# Patient Record
Sex: FEMALE | Race: WHITE | Employment: OTHER | URBAN - NONMETROPOLITAN AREA
[De-identification: names, ages, dates, MRNs, and addresses within clinical notes are randomized per-mention and may not be internally consistent; named-entity substitution may affect disease eponyms.]

---

## 2017-01-27 ENCOUNTER — ALLIED HEALTH/NURSE VISIT (OUTPATIENT)
Dept: AUDIOLOGY | Facility: OTHER | Age: 62
End: 2017-01-27
Attending: AUDIOLOGIST
Payer: MEDICAID

## 2017-01-27 DIAGNOSIS — H90.8 MIXED HEARING LOSS: Primary | ICD-10-CM

## 2017-01-27 PROCEDURE — V5266 BATTERY FOR HEARING DEVICE: HCPCS

## 2017-04-27 ENCOUNTER — ALLIED HEALTH/NURSE VISIT (OUTPATIENT)
Dept: AUDIOLOGY | Facility: OTHER | Age: 62
End: 2017-04-27
Attending: AUDIOLOGIST
Payer: MEDICAID

## 2017-04-27 DIAGNOSIS — H90.8 MIXED HEARING LOSS: ICD-10-CM

## 2017-04-27 PROCEDURE — V5266 BATTERY FOR HEARING DEVICE: HCPCS

## 2017-08-10 DIAGNOSIS — M48.061 STENOSIS, SPINAL, LUMBAR: Primary | ICD-10-CM

## 2017-08-11 ENCOUNTER — HOSPITAL ENCOUNTER (OUTPATIENT)
Dept: MRI IMAGING | Facility: HOSPITAL | Age: 62
Discharge: HOME | End: 2017-08-11
Attending: SPECIALIST | Admitting: SPECIALIST
Payer: MEDICAID

## 2017-08-11 PROCEDURE — A9585 GADOBUTROL INJECTION: HCPCS | Mod: TC

## 2017-08-11 PROCEDURE — 72158 MRI LUMBAR SPINE W/O & W/DYE: CPT | Mod: TC

## 2017-08-11 RX ORDER — GADOBUTROL 604.72 MG/ML
10 INJECTION INTRAVENOUS ONCE
Status: COMPLETED | OUTPATIENT
Start: 2017-08-11 | End: 2017-08-11

## 2017-08-11 RX ADMIN — GADOBUTROL 10 ML: 604.72 INJECTION INTRAVENOUS at 13:53

## 2017-08-14 DIAGNOSIS — H91.93 DECREASED HEARING, BILATERAL: Primary | ICD-10-CM

## 2017-08-21 ENCOUNTER — ALLIED HEALTH/NURSE VISIT (OUTPATIENT)
Dept: AUDIOLOGY | Facility: OTHER | Age: 62
End: 2017-08-21
Attending: AUDIOLOGIST
Payer: MEDICAID

## 2017-08-21 DIAGNOSIS — H90.8 MIXED HEARING LOSS: ICD-10-CM

## 2017-08-21 DIAGNOSIS — H90.8 MIXED HEARING LOSS, UNILATERAL: Primary | ICD-10-CM

## 2017-08-21 PROCEDURE — V5266 BATTERY FOR HEARING DEVICE: HCPCS

## 2017-09-13 ENCOUNTER — OFFICE VISIT (OUTPATIENT)
Dept: AUDIOLOGY | Facility: OTHER | Age: 62
End: 2017-09-13
Attending: AUDIOLOGIST
Payer: MEDICAID

## 2017-09-13 ENCOUNTER — OFFICE VISIT (OUTPATIENT)
Dept: OTOLARYNGOLOGY | Facility: OTHER | Age: 62
End: 2017-09-13
Attending: PHYSICIAN ASSISTANT
Payer: MEDICAID

## 2017-09-13 VITALS
WEIGHT: 220 LBS | BODY MASS INDEX: 33.34 KG/M2 | SYSTOLIC BLOOD PRESSURE: 110 MMHG | TEMPERATURE: 97.5 F | HEIGHT: 68 IN | HEART RATE: 71 BPM | DIASTOLIC BLOOD PRESSURE: 76 MMHG | OXYGEN SATURATION: 95 %

## 2017-09-13 DIAGNOSIS — J30.2 SEASONAL ALLERGIC RHINITIS, UNSPECIFIED CHRONICITY, UNSPECIFIED TRIGGER: ICD-10-CM

## 2017-09-13 DIAGNOSIS — H90.3 SENSORINEURAL HEARING LOSS (SNHL) OF BOTH EARS: Primary | ICD-10-CM

## 2017-09-13 DIAGNOSIS — H90.3 SENSORINEURAL HEARING LOSS, BILATERAL: Primary | ICD-10-CM

## 2017-09-13 DIAGNOSIS — Z98.890 HISTORY OF TYMPANOMASTOIDECTOMY: ICD-10-CM

## 2017-09-13 PROCEDURE — 99214 OFFICE O/P EST MOD 30 MIN: CPT | Mod: 25

## 2017-09-13 PROCEDURE — 99213 OFFICE O/P EST LOW 20 MIN: CPT | Mod: 25 | Performed by: PHYSICIAN ASSISTANT

## 2017-09-13 PROCEDURE — 92567 TYMPANOMETRY: CPT | Performed by: AUDIOLOGIST

## 2017-09-13 PROCEDURE — 92504 EAR MICROSCOPY EXAMINATION: CPT | Performed by: PHYSICIAN ASSISTANT

## 2017-09-13 PROCEDURE — 92590 ZZHC HEARING AID EXAM MONAURAL: CPT | Performed by: AUDIOLOGIST

## 2017-09-13 PROCEDURE — 92504 EAR MICROSCOPY EXAMINATION: CPT

## 2017-09-13 PROCEDURE — 92557 COMPREHENSIVE HEARING TEST: CPT | Performed by: AUDIOLOGIST

## 2017-09-13 PROCEDURE — 99203 OFFICE O/P NEW LOW 30 MIN: CPT

## 2017-09-13 RX ORDER — EPINEPHRINE 0.3 MG/.3ML
0.3 INJECTION SUBCUTANEOUS PRN
Status: ON HOLD | COMMUNITY
End: 2018-11-06

## 2017-09-13 RX ORDER — TRAZODONE HYDROCHLORIDE 50 MG/1
50 TABLET, FILM COATED ORAL AT BEDTIME
COMMUNITY

## 2017-09-13 RX ORDER — DIAZEPAM 2 MG
2 TABLET ORAL DAILY
COMMUNITY
Start: 2017-07-24 | End: 2018-10-12

## 2017-09-13 RX ORDER — HYDROCHLOROTHIAZIDE 25 MG/1
25 TABLET ORAL DAILY
Status: ON HOLD | COMMUNITY
End: 2021-01-01

## 2017-09-13 RX ORDER — ALBUTEROL SULFATE 0.83 MG/ML
2.5 SOLUTION RESPIRATORY (INHALATION) PRN
Status: ON HOLD | COMMUNITY
End: 2021-01-01

## 2017-09-13 RX ORDER — AMMONIUM LACTATE 12 G/100G
1 LOTION TOPICAL PRN
Status: ON HOLD | COMMUNITY
End: 2018-11-06

## 2017-09-13 RX ORDER — VITAMIN E 268 MG
2000 CAPSULE ORAL DAILY
Status: ON HOLD | COMMUNITY
End: 2018-11-06

## 2017-09-13 RX ORDER — LEVOTHYROXINE SODIUM 112 UG/1
112 TABLET ORAL DAILY
COMMUNITY

## 2017-09-13 RX ORDER — ALBUTEROL SULFATE 90 UG/1
2 AEROSOL, METERED RESPIRATORY (INHALATION) EVERY 4 HOURS PRN
COMMUNITY

## 2017-09-13 RX ORDER — FERROUS SULFATE 325(65) MG
325 TABLET ORAL DAILY
Status: ON HOLD | COMMUNITY
End: 2018-11-06

## 2017-09-13 RX ORDER — POTASSIUM CHLORIDE 1500 MG/1
40 TABLET, EXTENDED RELEASE ORAL DAILY
Status: ON HOLD | COMMUNITY
End: 2018-11-06

## 2017-09-13 RX ORDER — AMOXICILLIN 250 MG
2 CAPSULE ORAL DAILY
Status: ON HOLD | COMMUNITY
Start: 2017-07-24 | End: 2018-11-06

## 2017-09-13 RX ORDER — ATORVASTATIN CALCIUM 10 MG/1
10 TABLET, FILM COATED ORAL DAILY
COMMUNITY

## 2017-09-13 RX ORDER — CHOLECALCIFEROL (VITAMIN D3) 50 MCG
2000 TABLET ORAL DAILY
Status: ON HOLD | COMMUNITY
End: 2018-11-06

## 2017-09-13 RX ORDER — CITALOPRAM HYDROBROMIDE 40 MG/1
40 TABLET ORAL DAILY
Status: ON HOLD | COMMUNITY
End: 2018-11-06

## 2017-09-13 RX ORDER — OLOPATADINE HYDROCHLORIDE 1 MG/ML
1 SOLUTION/ DROPS OPHTHALMIC DAILY
Status: ON HOLD | COMMUNITY
End: 2018-11-06

## 2017-09-13 RX ORDER — CETIRIZINE HYDROCHLORIDE 10 MG/1
10 TABLET ORAL DAILY
COMMUNITY

## 2017-09-13 ASSESSMENT — PAIN SCALES - GENERAL: PAINLEVEL: NO PAIN (0)

## 2017-09-13 NOTE — NURSING NOTE
"Chief Complaint   Patient presents with     Cerumen Impaction     Ear cleaning/New last visit 06/24/2014       Initial /76 (BP Location: Left arm, Patient Position: Chair, Cuff Size: Adult Regular)  Pulse 71  Temp 97.5  F (36.4  C) (Tympanic)  Ht 5' 8\" (1.727 m)  Wt 220 lb (99.8 kg)  SpO2 95%  BMI 33.45 kg/m2 Estimated body mass index is 33.45 kg/(m^2) as calculated from the following:    Height as of this encounter: 5' 8\" (1.727 m).    Weight as of this encounter: 220 lb (99.8 kg).  Medication Reconciliation: complete   Che Hogan          "

## 2017-09-13 NOTE — MR AVS SNAPSHOT
"              After Visit Summary   9/13/2017    Jimena Mensah    MRN: [id number]           Patient Information     Date Of Birth          1955        Visit Information        Provider Department      9/13/2017 1:15 PM Zuleyka Lindo PA-C [facility]        Care Instructions    Ears look good  Audiogram to be completed    Follow up as needed for ear cleaning  Contact allergy nurse when you are ready to return for testing/ allergy shots    If there are concerns or questions, call [phone] and ask for nurse             Follow-ups after your visit        Your next 10 appointments already scheduled     Sep 13, 2017  1:45 PM CDT   Hearing Aid Initial with Jeff Bardales   [facility] [place] ([facility] [place] )    [address]  [place] [address] [phone]              Who to contact     If you have questions or need follow up information about today's clinic visit or your schedule please contact [facility] directly at [phone].  Normal or non-critical lab and imaging results will be communicated to you by Pre[employer]hart, letter or phone within 4 business days after the clinic has received the results. If you do not hear from us within 7 days, please contact the clinic through Preventsyshart or phone. If you have a critical or abnormal lab result, we will notify you by phone as soon as possible.  Submit refill requests through [employer] or call your pharmacy and they will forward the refill request to us. Please allow 3 business days for your refill to be completed.          Additional Information About Your Visit        Preventsyshart Information     [employer] lets you send messages to your doctor, view your test results, renew your prescriptions, schedule appointments and more. To sign up, go to [email or web address] . Click on \"Log in\" on the left side of the screen, which will take you to the Welcome page. Then click on \"Sign up Now\" on the right side of the page. " "    You will be asked to enter the access code listed below, as well as some personal information. Please follow the directions to create your username and password.     Your access code is: [email or web address]  Expires: 2017 11:35 AM     Your access code will  in 90 days. If you need help or a new code, please call your [facility] clinic or [phone].        Care EveryWhere ID     This is your Care EveryWhere ID. This could be used by other organizations to access your [facility] medical records  [id number]        Your Vitals Were     Pulse Temperature Height Pulse Oximetry BMI (Body Mass Index)       71 97.5  F (36.4  C) (Tympanic) 5' 8\" (1.727 m) 95% 33.45 kg/m2        Blood Pressure from Last 3 Encounters:   17 110/76   16 137/62   03/26/15 133/84    Weight from Last 3 Encounters:   17 220 lb (99.8 kg)   10/22/14 249 lb (112.9 kg)   14 230 lb (104.3 kg)              Today, you had the following     No orders found for display       Primary Care Provider Office Phone # Fax #    Chantell Cook [phone]       [facility] MEDICINE [address]  [facility] [address]        Equal Access to Services     [facility] PHILIP : Hadii elvi rubin hadasho Sohelder, waaxda luqadaha, qaybta kaalmada adedenae, charles colorado . So [facility] [phone].    ATENCIÓN: Si habla español, tiene a jean disposición servicios gratuitos de asistencia lingüística. Santosh al [phone].    We comply with applicable federal civil rights laws and [place] laws. We do not discriminate on the basis of race, color, national origin, age, disability sex, sexual orientation or gender identity.            Thank you!     Thank you for choosing [facility]  for your care. Our goal is always to provide you with excellent care. Hearing back from our patients is one way we can continue to improve our services. Please take a few minutes to complete the written survey that " you may receive in the mail after your visit with us. Thank you!             Your Updated Medication List - Protect others around you: Learn how to safely use, store and throw away your medicines at www.disposemymeds.org.          This list is accurate as of: 9/13/17  1:40 PM.  Always use your most recent med list.                   Brand Name Dispense Instructions for use Diagnosis    * albuterol (5 MG/ML) 0.5% neb solution    PROVENTIL     Take 2.5 mg by nebulization every 6 hours as needed for wheezing or shortness of breath / dyspnea        * albuterol (2.5 MG/3ML) 0.083% neb solution      Inhale 2.5 mg into the lungs as needed        * albuterol 108 (90 BASE) MCG/ACT Inhaler    PROAIR HFA/PROVENTIL HFA/VENTOLIN HFA     Inhale 2 puffs into the lungs as needed        atorvastatin 10 MG tablet    LIPITOR     Take 10 mg by mouth daily        budesonide 32 MCG/ACT spray    RINOCORT AQUA     Spray 2 sprays into both nostrils as needed        calcium carb 1250 mg (500 mg Nikolai)/vitamin D 200 units 500-200 MG-UNIT per tablet    OSCAL with D     Take 1 tablet by mouth daily        * CELEXA PO      Take 40 mg by mouth daily        * citalopram 40 MG tablet    celeXA     Take 40 mg by mouth daily        cetirizine 10 MG tablet    zyrTEC     Take 10 mg by mouth daily        * cholecalciferol 15262 UNITS capsule    vitamin D3     Take 1 capsule by mouth daily.        * vitamin D 2000 UNITS tablet      Take 2,000 Units by mouth daily        DAILY AMINO 6000 PO      Take 625 mg by mouth daily        * diazepam 2 MG tablet    VALIUM     Take 1 tablet by mouth daily For vertigo        * diazepam 2 MG tablet    VALIUM     Take 2 mg by mouth daily        ELIDEL 1 % cream   Generic drug:  pimecrolimus      Apply  topically 2 times daily.        * EPINEPHrine 0.3 MG/0.3ML injection 2-pack    EPIPEN/ADRENACLICK/or ANY BX GENERIC EQUIV     Inject 0.3 mg into the muscle as needed        * EPIPEN 0.3 MG/0.3ML injection   Generic drug:   EPINEPHrine      Inject 0.3 mg into the muscle once as needed. For anaphylaxis        * ferrous sulfate 325 (65 FE) MG tablet    IRON     Take 325 mg by mouth daily (with breakfast)        * ferrous sulfate 325 (65 FE) MG tablet    IRON     Take 325 mg by mouth daily        FIBERCON 625 MG tablet   Generic drug:  calcium polycarbophil      Take 1 tablet by mouth 2 times daily as needed.        flaxseed oil 1000 MG Caps      Take 2,000 mg by mouth daily        * hydrochlorothiazide 25 MG tablet    HYDRODIURIL     Take 25 mg by mouth daily.        * hydrochlorothiazide 25 MG tablet    HYDRODIURIL     Take 25 mg by mouth daily        HYDROcodone-acetaminophen 5-325 MG per tablet    NORCO     Take 2 tablets by mouth every 4 hours as needed for moderate to severe pain        ipratropium - albuterol 0.5 mg/2.5 mg/3 mL 0.5-2.5 (3) MG/3ML neb solution    DUONEB    360 mL    Take 1 vial (3 mLs) by nebulization every 4 hours as needed for shortness of breath / dyspnea or wheezing        * LAC-HYDRIN 12 % lotion   Generic drug:  ammonium lactate      Apply  topically 2 times daily.        * ammonium lactate 12 % lotion    LAC-HYDRIN     Apply 1 inch topically as needed        levothyroxine 112 MCG tablet    SYNTHROID/LEVOTHROID     Take 112 mcg by mouth daily        montelukast 10 MG tablet    SINGULAIR     Take 10 mg by mouth At Bedtime        MOVANTIK 12.5 MG Tabs tablet   Generic drug:  naloxegol      Take 12.5 mg by mouth every morning (before breakfast)        multivitamin Tabs tablet      Take 1 tablet by mouth daily        olopatadine 0.6 % nasal spray    PATANASE    2 Bottle    Spray 2 sprays into both nostrils 2 times daily    Seasonal allergic rhinitis, ETD (eustachian tube dysfunction)       * PATANOL 0.1 % ophthalmic solution   Generic drug:  olopatadine      Place 1 drop into both eyes 2 times daily.        * olopatadine 0.1 % ophthalmic solution    PATANOL     Place 1 drop into both eyes daily        polyethylene  glycol powder    MIRALAX/GLYCOLAX     Take 1 capful by mouth daily        * potassium chloride SA 20 MEQ CR tablet    K-DUR/KLOR-CON M     Take 40 mEq by mouth daily        * potassium chloride SA 20 MEQ CR tablet    K-DUR/KLOR-CON M     Take 2 tablets by mouth 2 times daily. With food        PREDNISONE PO      Take by mouth daily Doesn't know mg        senna-docusate 8.6-50 MG per tablet    SENOKOT-S;PERICOLACE     Take 2 tablets by mouth daily        tolterodine 4 MG 24 hr capsule    DETROL LA     Take by mouth daily        traZODone 50 MG tablet    DESYREL     Take 50 mg by mouth daily        triamcinolone 55 MCG/ACT Inhaler    NASACORT     Spray 2 sprays into both nostrils daily        ZANAFLEX PO      Take 4 mg by mouth every 8 hours as needed for muscle spasms        * Notice:  This list has 21 medication(s) that are the same as other medications prescribed for you. Read the directions carefully, and ask your doctor or other care provider to review them with you.

## 2017-09-13 NOTE — PROGRESS NOTES
Audiology Evaluation Completed. Please refer SCANNED AUDIOGRAM and/or TYMPANOGRAM for BACKGROUND, RESULTS, RECOMMENDATIONS.    Genesis HATCH, [facility]-A  Audiologist #[id number]          BACKGROUND:  Jimena was seen today for consult regarding hearing aids. The patient notes difficulty with communication in a variety of listening situations and would like to explore possible benefit obtained via use of amplification.      Patient has received medical clearance for hearing aid use from [facility]uleyka [facility].  Jimena is a monaural hearing aid candidate and will receive a Hearing Aid Recommendation today to follow [facility] CONTRACT GUIDELINES .    RESULTS:  Brochure form [facility] titled 'Legal rights and consumer information about purchasing a hearing instrument ' verbally reviewed and given to patient. Trial Period, cancellation fee, warranties highlighted. Estimated insurance coverage for hearing aids reviewed.      Thru use of hearing aids she would like to improve her ability to hear:    In restaurants where there are groups and noise.     She would like to hear the television at a lower volume     Jimena also reports trouble hearing in the car, at home, and on the telephone if there is not a volume control.      Discussed hearing aid styles, technology, features, and options at length with Jimena.  Sample devices were shown. Pros/Cons of options reviewed.  Expectations for amplification discussed. .Also discussed the importance of binaural amplification for hearing speech in the presence of background noise and localizing the directions of sounds.  Wireless options were also discussed at length and sample devices were shown.     Hearing Aid Recommendations: Hearing Aid Recommendation reviewed with patient. Pt chose to proceed with order and Purchase Agreement completed. Copies provided to patient.      Hearing Aids:  Right Unitron T  CRT  Color: light grey  Battery Size:  312  Earmold/Tips:  IN-THE-CANAL (JENN)       RECOMMENDATIONS:  Recommended to return to clinic in 2 weeks for hearing aid fitting, programming and orientation.    Genesis Bailey M.S.,[facility]-A  Audiologist #[id number]

## 2017-09-13 NOTE — PATIENT INSTRUCTIONS
Ears look good  Audiogram to be completed    Follow up as needed for ear cleaning  Contact allergy nurse when you are ready to return for testing/ allergy shots    If there are concerns or questions, call [phone] and ask for nurse

## 2017-09-13 NOTE — PROGRESS NOTES
Chief Complaint   Patient presents with     Cerumen Impaction     Ear cleaning/New last visit 06/24/2014     Chiquita presents for ear cleaning/ audiogram after. She has hx of aids/ and is up for new aids.   She has no otorrhea, otalgia.   Past hx of right tympanomastoidectomy. She has no concerns with right ear denies otalgia, otorrhea.     No c/o tinnitus.   Hearing decreasing for left.   Chiquita has hx of being on SCIT. Completed series partially but then quit to rides.   She has been wanting to get retested.     Past Medical History:   Diagnosis Date     Alcoholism (H)      Allergic rhinitis, cause unspecified 8/16/2012     Arthritis      Follow-up examination, following other surgery 8/16/2012     Hallucinations 10/23/2008     Hypertension      Mixed hearing loss, unspecified 8/16/2012     Sensorineural hearing loss, unspecified 8/16/2012     Thyroid disease         Allergies   Allergen Reactions     Cats      Other reaction(s): *Unknown  Cat/feline product derivatives  Fur-animal dander     Dust Mite Extract      Other reaction(s): *Unknown  Trees, pollen, ragweed     Dust Mites      House dust     Fluticasone      Other reaction(s): Headache     Fluticasone Propionate Other (See Comments)     Flonase  headaches     Mometasone Other (See Comments)     Mometasone Furoate      Nasonex  Headaches     Ragweeds      Trees      Current Outpatient Prescriptions   Medication     HYDROcodone-acetaminophen (NORCO) 5-325 MG per tablet     PREDNISONE PO     Citalopram Hydrobromide (CELEXA PO)     ferrous sulfate (IRON) 325 (65 FE) MG tablet     montelukast (SINGULAIR) 10 MG tablet     naloxegol (MOVANTIK) 12.5 MG TABS tablet     multivitamin (OCUVITE) TABS     albuterol (PROVENTIL) (5 MG/ML) 0.5% nebulizer solution     polyethylene glycol (MIRALAX/GLYCOLAX) powder     triamcinolone (NASACORT) 55 MCG/ACT nasal inhaler     TiZANidine HCl (ZANAFLEX PO)     ipratropium - albuterol 0.5 mg/2.5 mg/3 mL (DUONEB) 0.5-2.5 (3) MG/3ML  "nebulization     tolterodine (DETROL LA) 4 MG 24 hr capsule     olopatadine (PATANASE) 0.6 % nasal spray     pimecrolimus (ELIDEL) 1 % cream     ammonium lactate (LAC-HYDRIN) 12 % lotion     olopatadine (PATANOL) 0.1 % ophthalmic solution     cholecalciferol (VITAMIN D3) 41016 UNITS capsule     hydrochlorothiazide (HYDRODIURIL) 25 MG tablet     potassium chloride SA (K-DUR,KLOR-CON M) 20 MEQ tablet     EPINEPHrine (EPIPEN) 0.3 MG/0.3ML injection     diazepam (VALIUM) 2 MG tablet     polycarbophil (FIBERCON) 625 MG tablet     No current facility-administered medications for this visit.       ROS: 10 point ROS neg other than the symptoms noted above in the HPI.  /76 (BP Location: Left arm, Patient Position: Chair, Cuff Size: Adult Regular)  Pulse 71  Temp 97.5  F (36.4  C) (Tympanic)  Ht 5' 8\" (1.727 m)  Wt 220 lb (99.8 kg)  SpO2 95%  BMI 33.45 kg/m2  General Appearance:  healthy, alert, active and no distress  Head: Normocephalic. No masses, lesions, tenderness or abnormalities  Eyes: conjuctiva clear, PERRL, EOM intact  Ears: External ears normal. Canals w/scant cerumen.  The ear canals were examined underneath the operating microscope and with an otologic speculum.  The canals were cleaned of all debris with gently suctioning and use of alligator forceps.   There is no granulation tissue or sign of cholesteatoma.  The patient tolerates this well.   Right TM surgical changes. Left TM intact. No effusion or retraction.     Nose: Nares normal  Mouth: normal  Neck: Supple, no cervical adenopathy, no thyromegaly, Full range of motion in all planes        The lips appear normal and without lesion.  The dentition is  in good condition  The patient has a normal appearing and functioning hard and soft palate without bifid uvula or submucosal cleft.  The tongue is normal in appearance without erosive lesion or fungating mass.  The oral mucosa is soft and normal in appearance.  The patient also has a soft floor of " mouth and base of tongue.         ASSESSMENT:    ICD-10-CM    1. Sensorineural hearing loss (SNHL) of both ears H90.3    2. History of tympanomastoidectomy Z98.890     Right   3. Seasonal allergic rhinitis, unspecified chronicity, unspecified trigger J30.2        Ears were stable.   Audiogram is pending for today. Past 3 audiograms appear stable results. Will review audiogram. Patient is cleared for new aids.     Hearing preservation reviewed with patient.     Patient may consider MQT. She will contact nurse and be scheduled and consent obtained on day of allergy testing.       Zuleyka Lindo PA-C  ENT  [facility] [place]  [phone]

## 2017-09-13 NOTE — MR AVS SNAPSHOT
"              After Visit Summary   9/13/2017    Jimena Mensah    MRN: [id number]           Patient Information     Date Of Birth          1955        Visit Information        Provider Department      9/13/2017 1:45 PM [facility]        Today's Diagnoses     Sensorineural hearing loss, bilateral    -  1       Follow-ups after your visit        Your next 10 appointments already scheduled     Sep 27, 2017  1:30 PM CDT   (Arrive by 1:15 PM)   Aid Fitting with Jeff Bardales   [facility] [place] ([facility] [place] )    [address]  [place] [address]   [phone]              Who to contact     If you have questions or need follow up information about today's clinic visit or your schedule please contact [facility] directly at [phone].  Normal or non-critical lab and imaging results will be communicated to you by MyChart, letter or phone within 4 business days after the clinic has received the results. If you do not hear from us within 7 days, please contact the clinic through MyChart or phone. If you have a critical or abnormal lab result, we will notify you by phone as soon as possible.  Submit refill requests through [employer] or call your pharmacy and they will forward the refill request to us. Please allow 3 business days for your refill to be completed.          Additional Information About Your Visit        MyChart Information     [employer] lets you send messages to your doctor, view your test results, renew your prescriptions, schedule appointments and more. To sign up, go to [email or web address] . Click on \"Log in\" on the left side of the screen, which will take you to the Welcome page. Then click on \"Sign up Now\" on the right side of the page.     You will be asked to enter the access code listed below, as well as some personal information. Please follow the directions to create your username and password.     Your " access code is: [email or web address]  Expires: 2017 11:35 AM     Your access code will  in 90 days. If you need help or a new code, please call your [place] clinic or [phone].        Care EveryWhere ID     This is your Care EveryWhere ID. This could be used by other organizations to access your [place] medical records  [id number]         Blood Pressure from Last 3 Encounters:   17 110/76   16 137/62   03/26/15 133/84    Weight from Last 3 Encounters:   17 220 lb (99.8 kg)   10/22/14 249 lb (112.9 kg)   14 230 lb (104.3 kg)              We Performed the Following     AUDIOGRAM/TYMPANOGRAM - INTERFACE        Primary Care Provider Office Phone # Fax #    Chantell Cook [phone]       [facility] MEDICINE [address]  [facility] [address]        Equal Access to Services     [facility] : Hadii aad ku hadasho Soomaali, waaxda luqadaha, qaybta kaalmada adeegyada, waxay idiin hayaan april [place] . So [facility] [phone].    ATENCIÓN: Si habla español, tiene a jean disposición servicios gratuitos de asistencia lingüística. Llame al [phone].    We comply with applicable federal civil rights laws and [place] laws. We do not discriminate on the basis of race, color, national origin, age, disability sex, sexual orientation or gender identity.            Thank you!     Thank you for choosing [facility]  for your care. Our goal is always to provide you with excellent care. Hearing back from our patients is one way we can continue to improve our services. Please take a few minutes to complete the written survey that you may receive in the mail after your visit with us. Thank you!             Your Updated Medication List - Protect others around you: Learn how to safely use, store and throw away your medicines at www.disposemymeds.org.          This list is accurate as of: 17  2:37 PM.  Always use your most recent med list.                    Brand Name Dispense Instructions for use Diagnosis    * albuterol (5 MG/ML) 0.5% neb solution    PROVENTIL     Take 2.5 mg by nebulization every 6 hours as needed for wheezing or shortness of breath / dyspnea        * albuterol (2.5 MG/3ML) 0.083% neb solution      Inhale 2.5 mg into the lungs as needed        * albuterol 108 (90 BASE) MCG/ACT Inhaler    PROAIR HFA/PROVENTIL HFA/VENTOLIN HFA     Inhale 2 puffs into the lungs as needed        atorvastatin 10 MG tablet    LIPITOR     Take 10 mg by mouth daily        budesonide 32 MCG/ACT spray    RINOCORT AQUA     Spray 2 sprays into both nostrils as needed        calcium carb 1250 mg (500 mg [place])/vitamin D 200 units 500-200 MG-UNIT per tablet    OSCAL with D     Take 1 tablet by mouth daily        * CELEXA PO      Take 40 mg by mouth daily        * citalopram 40 MG tablet    celeXA     Take 40 mg by mouth daily        cetirizine 10 MG tablet    zyrTEC     Take 10 mg by mouth daily        * cholecalciferol 37451 UNITS capsule    vitamin D3     Take 1 capsule by mouth daily.        * vitamin D 2000 UNITS tablet      Take 2,000 Units by mouth daily        DAILY AMINO 6000 PO      Take 625 mg by mouth daily        * diazepam 2 MG tablet    VALIUM     Take 1 tablet by mouth daily For vertigo        * diazepam 2 MG tablet    VALIUM     Take 2 mg by mouth daily        ELIDEL 1 % cream   Generic drug:  pimecrolimus      Apply  topically 2 times daily.        * EPINEPHrine 0.3 MG/0.3ML injection 2-pack    EPIPEN/ADRENACLICK/or ANY BX GENERIC EQUIV     Inject 0.3 mg into the muscle as needed        * EPIPEN 0.3 MG/0.3ML injection   Generic drug:  EPINEPHrine      Inject 0.3 mg into the muscle once as needed. For anaphylaxis        * ferrous sulfate 325 (65 FE) MG tablet    IRON     Take 325 mg by mouth daily (with breakfast)        * ferrous sulfate 325 (65 FE) MG tablet    IRON     Take 325 mg by mouth daily        FIBERCON 625 MG tablet   Generic drug:  calcium  polycarbophil      Take 1 tablet by mouth 2 times daily as needed.        flaxseed oil 1000 MG Caps      Take 2,000 mg by mouth daily        * hydrochlorothiazide 25 MG tablet    HYDRODIURIL     Take 25 mg by mouth daily.        * hydrochlorothiazide 25 MG tablet    HYDRODIURIL     Take 25 mg by mouth daily        HYDROcodone-acetaminophen 5-325 MG per tablet    NORCO     Take 2 tablets by mouth every 4 hours as needed for moderate to severe pain        ipratropium - albuterol 0.5 mg/2.5 mg/3 mL 0.5-2.5 (3) MG/3ML neb solution    DUONEB    360 mL    Take 1 vial (3 mLs) by nebulization every 4 hours as needed for shortness of breath / dyspnea or wheezing        * LAC-HYDRIN 12 % lotion   Generic drug:  ammonium lactate      Apply  topically 2 times daily.        * ammonium lactate 12 % lotion    LAC-HYDRIN     Apply 1 inch topically as needed        levothyroxine 112 MCG tablet    SYNTHROID/LEVOTHROID     Take 112 mcg by mouth daily        montelukast 10 MG tablet    SINGULAIR     Take 10 mg by mouth At Bedtime        MOVANTIK 12.5 MG Tabs tablet   Generic drug:  naloxegol      Take 12.5 mg by mouth every morning (before breakfast)        multivitamin Tabs tablet      Take 1 tablet by mouth daily        olopatadine 0.6 % nasal spray    PATANASE    2 Bottle    Spray 2 sprays into both nostrils 2 times daily    Seasonal allergic rhinitis, ETD (eustachian tube dysfunction)       * PATANOL 0.1 % ophthalmic solution   Generic drug:  olopatadine      Place 1 drop into both eyes 2 times daily.        * olopatadine 0.1 % ophthalmic solution    PATANOL     Place 1 drop into both eyes daily        polyethylene glycol powder    MIRALAX/GLYCOLAX     Take 1 capful by mouth daily        * potassium chloride SA 20 MEQ CR tablet    K-DUR/KLOR-CON M     Take 40 mEq by mouth daily        * potassium chloride SA 20 MEQ CR tablet    K-DUR/KLOR-CON M     Take 2 tablets by mouth 2 times daily. With food        PREDNISONE PO      Take by  mouth daily Doesn't know mg        senna-docusate 8.6-50 MG per tablet    SENOKOT-S;PERICOLACE     Take 2 tablets by mouth daily        tolterodine 4 MG 24 hr capsule    DETROL LA     Take by mouth daily        traZODone 50 MG tablet    DESYREL     Take 50 mg by mouth daily        triamcinolone 55 MCG/ACT Inhaler    NASACORT     Spray 2 sprays into both nostrils daily        ZANAFLEX PO      Take 4 mg by mouth every 8 hours as needed for muscle spasms        * Notice:  This list has 21 medication(s) that are the same as other medications prescribed for you. Read the directions carefully, and ask your doctor or other care provider to review them with you.

## 2017-09-27 ENCOUNTER — OFFICE VISIT (OUTPATIENT)
Dept: AUDIOLOGY | Facility: OTHER | Age: 62
End: 2017-09-27
Attending: AUDIOLOGIST
Payer: MEDICAID

## 2017-09-27 DIAGNOSIS — H90.3 SENSORINEURAL HEARING LOSS, BILATERAL: Primary | ICD-10-CM

## 2017-09-27 PROCEDURE — V5257 HEARING AID, DIGIT, MON, BTE: HCPCS | Mod: NU | Performed by: AUDIOLOGIST

## 2017-09-27 PROCEDURE — V5264 EAR MOLD/INSERT: HCPCS | Mod: RT | Performed by: AUDIOLOGIST

## 2017-09-27 NOTE — MR AVS SNAPSHOT
"              After Visit Summary   9/27/2017    Jimena Mensah    MRN: [id number]           Patient Information     Date Of Birth          1955        Visit Information        Provider Department      9/27/2017 1:30 PM Genesis Bailey AuD [facility]        Today's Diagnoses     Sensorineural hearing loss, bilateral    -  1       Follow-ups after your visit        Your next 10 appointments already scheduled     Oct 17, 2017  2:00 PM CDT   (Arrive by 1:45 PM)   Return Visit with Jeff Bardales   [facility] [place] ([facility] [place] )    [address]  [place] [address]   [phone]              Who to contact     If you have questions or need follow up information about today's clinic visit or your schedule please contact [facility] directly at [phone].  Normal or non-critical lab and imaging results will be communicated to you by MyChart, letter or phone within 4 business days after the clinic has received the results. If you do not hear from us within 7 days, please contact the clinic through MyChart or phone. If you have a critical or abnormal lab result, we will notify you by phone as soon as possible.  Submit refill requests through [employer] or call your pharmacy and they will forward the refill request to us. Please allow 3 business days for your refill to be completed.          Additional Information About Your Visit        MyChart Information     [employer] lets you send messages to your doctor, view your test results, renew your prescriptions, schedule appointments and more. To sign up, go to [email or web address] . Click on \"Log in\" on the left side of the screen, which will take you to the Welcome page. Then click on \"Sign up Now\" on the right side of the page.     You will be asked to enter the access code listed below, as well as some personal information. Please follow the directions to create your username and password.     Your " access code is: [email or web address]  Expires: 2017 11:35 AM     Your access code will  in 90 days. If you need help or a new code, please call your [place] clinic or [phone].        Care EveryWhere ID     This is your Care EveryWhere ID. This could be used by other organizations to access your [place] medical records  [id number]         Blood Pressure from Last 3 Encounters:   17 110/76   16 137/62   03/26/15 133/84    Weight from Last 3 Encounters:   17 220 lb (99.8 kg)   10/22/14 249 lb (112.9 kg)   14 230 lb (104.3 kg)              Today, you had the following     No orders found for display       Primary Care Provider Office Phone # Fax #    Chantell MAYANK Cook [phone]       [facility] MEDICINE [address]  [facility] [address]        Equal Access to Services     [facility] : Hadii aad ku hadasho Soomaali, waaxda luqadaha, qaybta kaalmada adeegyada, waxay idiin hayaan april [place] . So [facility] [phone].    ATENCIÓN: Si habla español, tiene a jean disposición servicios gratuitos de asistencia lingüística. Llame al [phone].    We comply with applicable federal civil rights laws and [place] laws. We do not discriminate on the basis of race, color, national origin, age, disability sex, sexual orientation or gender identity.            Thank you!     Thank you for choosing [facility]  for your care. Our goal is always to provide you with excellent care. Hearing back from our patients is one way we can continue to improve our services. Please take a few minutes to complete the written survey that you may receive in the mail after your visit with us. Thank you!             Your Updated Medication List - Protect others around you: Learn how to safely use, store and throw away your medicines at www.disposemymeds.org.          This list is accurate as of: 17  1:56 PM.  Always use your most recent med list.                    Brand Name Dispense Instructions for use Diagnosis    * albuterol (5 MG/ML) 0.5% neb solution    PROVENTIL     Take 2.5 mg by nebulization every 6 hours as needed for wheezing or shortness of breath / dyspnea        * albuterol (2.5 MG/3ML) 0.083% neb solution      Inhale 2.5 mg into the lungs as needed        * albuterol 108 (90 BASE) MCG/ACT Inhaler    PROAIR HFA/PROVENTIL HFA/VENTOLIN HFA     Inhale 2 puffs into the lungs as needed        atorvastatin 10 MG tablet    LIPITOR     Take 10 mg by mouth daily        budesonide 32 MCG/ACT spray    RINOCORT AQUA     Spray 2 sprays into both nostrils as needed        calcium carb 1250 mg (500 mg [place])/vitamin D 200 units 500-200 MG-UNIT per tablet    OSCAL with D     Take 1 tablet by mouth daily        * CELEXA PO      Take 40 mg by mouth daily        * citalopram 40 MG tablet    celeXA     Take 40 mg by mouth daily        cetirizine 10 MG tablet    zyrTEC     Take 10 mg by mouth daily        * cholecalciferol 01520 UNITS capsule    vitamin D3     Take 1 capsule by mouth daily.        * vitamin D 2000 UNITS tablet      Take 2,000 Units by mouth daily        DAILY AMINO 6000 PO      Take 625 mg by mouth daily        * diazepam 2 MG tablet    VALIUM     Take 1 tablet by mouth daily For vertigo        * diazepam 2 MG tablet    VALIUM     Take 2 mg by mouth daily        ELIDEL 1 % cream   Generic drug:  pimecrolimus      Apply  topically 2 times daily.        * EPINEPHrine 0.3 MG/0.3ML injection 2-pack    EPIPEN/ADRENACLICK/or ANY BX GENERIC EQUIV     Inject 0.3 mg into the muscle as needed        * EPIPEN 0.3 MG/0.3ML injection   Generic drug:  EPINEPHrine      Inject 0.3 mg into the muscle once as needed. For anaphylaxis        * ferrous sulfate 325 (65 FE) MG tablet    IRON     Take 325 mg by mouth daily (with breakfast)        * ferrous sulfate 325 (65 FE) MG tablet    IRON     Take 325 mg by mouth daily        FIBERCON 625 MG tablet   Generic drug:  calcium  polycarbophil      Take 1 tablet by mouth 2 times daily as needed.        flaxseed oil 1000 MG Caps      Take 2,000 mg by mouth daily        * hydrochlorothiazide 25 MG tablet    HYDRODIURIL     Take 25 mg by mouth daily.        * hydrochlorothiazide 25 MG tablet    HYDRODIURIL     Take 25 mg by mouth daily        HYDROcodone-acetaminophen 5-325 MG per tablet    NORCO     Take 2 tablets by mouth every 4 hours as needed for moderate to severe pain        ipratropium - albuterol 0.5 mg/2.5 mg/3 mL 0.5-2.5 (3) MG/3ML neb solution    DUONEB    360 mL    Take 1 vial (3 mLs) by nebulization every 4 hours as needed for shortness of breath / dyspnea or wheezing        * LAC-HYDRIN 12 % lotion   Generic drug:  ammonium lactate      Apply  topically 2 times daily.        * ammonium lactate 12 % lotion    LAC-HYDRIN     Apply 1 inch topically as needed        levothyroxine 112 MCG tablet    SYNTHROID/LEVOTHROID     Take 112 mcg by mouth daily        montelukast 10 MG tablet    SINGULAIR     Take 10 mg by mouth At Bedtime        MOVANTIK 12.5 MG Tabs tablet   Generic drug:  naloxegol      Take 12.5 mg by mouth every morning (before breakfast)        multivitamin Tabs tablet      Take 1 tablet by mouth daily        olopatadine 0.6 % nasal spray    PATANASE    2 Bottle    Spray 2 sprays into both nostrils 2 times daily    Seasonal allergic rhinitis, ETD (eustachian tube dysfunction)       * PATANOL 0.1 % ophthalmic solution   Generic drug:  olopatadine      Place 1 drop into both eyes 2 times daily.        * olopatadine 0.1 % ophthalmic solution    PATANOL     Place 1 drop into both eyes daily        polyethylene glycol powder    MIRALAX/GLYCOLAX     Take 1 capful by mouth daily        * potassium chloride SA 20 MEQ CR tablet    K-DUR/KLOR-CON M     Take 40 mEq by mouth daily        * potassium chloride SA 20 MEQ CR tablet    K-DUR/KLOR-CON M     Take 2 tablets by mouth 2 times daily. With food        PREDNISONE PO      Take by  mouth daily Doesn't know mg        senna-docusate 8.6-50 MG per tablet    SENOKOT-S;PERICOLACE     Take 2 tablets by mouth daily        tolterodine 4 MG 24 hr capsule    DETROL LA     Take by mouth daily        traZODone 50 MG tablet    DESYREL     Take 50 mg by mouth daily        triamcinolone 55 MCG/ACT Inhaler    NASACORT     Spray 2 sprays into both nostrils daily        ZANAFLEX PO      Take 4 mg by mouth every 8 hours as needed for muscle spasms        * Notice:  This list has 21 medication(s) that are the same as other medications prescribed for you. Read the directions carefully, and ask your doctor or other care provider to review them with you.

## 2017-09-27 NOTE — PROGRESS NOTES
AUDIOLOGY REPORT    BACKGROUND INFORMATION: Jimena Mensah was seen in the Audiology Clinic  on 9/27/2017 for fitting of [employer] [id number]  IN-THE-CANAL (JENN) Behind-The-Ear Hearing aids for the right ear  Earmold is large closed dome  IN-THE-CANAL (JENN) size 2.    Serial Number Right: #[id number]  Battery Size: #312  24 cells given today.      PROCEDURES: Hearing aids were placed and they provided a good fit,.   After this fitting an orientation was completed giving her information on the use of the instruments. .  Patient was counseled on the occlusion effect, acclimating to hearing aids and adaptation time. Use and care of the instruments was reviewed.  Earmold was  were fit onto the hearing aids and provided a good fit.   Hearing aid conformity evaluation performed with good results. Patient was provided wire loop and brush cleaning tools which was demonstrated and patient showed ability to use. The patient demonstrated good ability to insert and remove the earmolds and batteries. Patient was given all suppliesand a Manual for the hearing aids.  How to manually use program toggle for the volume was discussed at length.     SUMMARY AND RECOMMENDATIONS: Right hearing aid  was fit today.   The patient will return for follow-up.  Call this clinic with questions regarding today s appointment.        Genesis Bailey M.S., [facility]  Audiologist #[id number]

## 2017-11-03 ENCOUNTER — OFFICE VISIT (OUTPATIENT)
Dept: AUDIOLOGY | Facility: OTHER | Age: 62
End: 2017-11-03
Attending: AUDIOLOGIST
Payer: MEDICAID

## 2017-11-03 DIAGNOSIS — H90.3 SENSORINEURAL HEARING LOSS, BILATERAL: Primary | ICD-10-CM

## 2017-11-03 PROCEDURE — V5299 HEARING SERVICE: HCPCS | Performed by: AUDIOLOGIST

## 2017-11-03 NOTE — MR AVS SNAPSHOT
After Visit Summary   11/3/2017    Jimena Mensah    MRN: [id number]           Patient Information     Date Of Birth          1955        Visit Information        Provider Department      11/3/2017 1:30 PM Genesis Bailey, [facility]        Today's Diagnoses     Sensorineural hearing loss, bilateral    -  1       Follow-ups after your visit        Your next 10 appointments already scheduled     Nov 07, 2017  1:00 PM CST   MR CERVICAL SPINE W/O CONTRAST with HIMR1   HI MRI ([facility] )    [address] [address] [phone]           Take your medicines as usual, unless your doctor tells you not to. Bring a list of your current medicines to your exam (including vitamins, minerals and over-the-counter drugs). Also bring the results of similar scans you may have had.  Please remove any body piercings and hair extensions before you arrive.  Follow your doctor s orders. If you do not, we may have to postpone your exam.  You will not have contrast for this exam. You do not need to do anything special to prepare.  The MRI machine uses a strong magnet. Please wear clothes without metal (snaps, zippers). A sweatsuit works well, or we may give you a hospital gown.   **IMPORTANT** THE INSTRUCTIONS BELOW ARE ONLY FOR THOSE PATIENTS WHO HAVE BEEN TOLD THEY WILL RECEIVE SEDATION OR GENERAL ANESTHESIA DURING THEIR MRI PROCEDURE:  IF YOU WILL RECEIVE SEDATION (take medicine to help you relax during your exam):   You must get the medicine from your doctor before you arrive. Bring the medicine to the exam. Do not take it at home.   Arrive one hour early. Bring someone who can take you home after the test. Your medicine will make you sleepy. After the exam, you may not drive, take a bus or take a taxi by yourself.   No eating 8 hours before your exam. You may have clear liquids up until 4 hours before your exam. (Clear liquids include water, clear tea, black  coffee and fruit juice without pulp.)  IF YOU WILL RECEIVE ANESTHESIA (be asleep for your exam):   Arrive 1 1/2 hours early. Bring someone who can take you home after the test. You may not drive, take a bus or take a taxi by yourself.   No eating 8 hours before your exam. You may have clear liquids up until 4 hours before your exam. (Clear liquids include water, clear tea, black coffee and fruit juice without pulp.)   You will spend four to five hours in the recovery room.  Please call the Imaging Department at your exam site with any questions.            Nov 07, 2017  1:30 PM CST   MR LUMBAR SPINE W/O CONTRAST with HIMR1   HI MRI ([facility] )    [address] [address] [phone]           Take your medicines as usual, unless your doctor tells you not to. Bring a list of your current medicines to your exam (including vitamins, minerals and over-the-counter drugs). Also bring the results of similar scans you may have had.  Please remove any body piercings and hair extensions before you arrive.  Follow your doctor s orders. If you do not, we may have to postpone your exam.  You will not have contrast for this exam. You do not need to do anything special to prepare.  The MRI machine uses a strong magnet. Please wear clothes without metal (snaps, zippers). A sweatsuit works well, or we may give you a hospital gown.   **IMPORTANT** THE INSTRUCTIONS BELOW ARE ONLY FOR THOSE PATIENTS WHO HAVE BEEN TOLD THEY WILL RECEIVE SEDATION OR GENERAL ANESTHESIA DURING THEIR MRI PROCEDURE:  IF YOU WILL RECEIVE SEDATION (take medicine to help you relax during your exam):   You must get the medicine from your doctor before you arrive. Bring the medicine to the exam. Do not take it at home.   Arrive one hour early. Bring someone who can take you home after the test. Your medicine will make you sleepy. After the exam, you may not drive, take a bus or take a taxi by yourself.   No eating 8 hours  "before your exam. You may have clear liquids up until 4 hours before your exam. (Clear liquids include water, clear tea, black coffee and fruit juice without pulp.)  IF YOU WILL RECEIVE ANESTHESIA (be asleep for your exam):   Arrive 1 1/2 hours early. Bring someone who can take you home after the test. You may not drive, take a bus or take a taxi by yourself.   No eating 8 hours before your exam. You may have clear liquids up until 4 hours before your exam. (Clear liquids include water, clear tea, black coffee and fruit juice without pulp.)   You will spend four to five hours in the recovery room.  Please call the Imaging Department at your exam site with any questions.              Who to contact     If you have questions or need follow up information about today's clinic visit or your schedule please contact [facility] directly at [phone].  Normal or non-critical lab and imaging results will be communicated to you by MyChart, letter or phone within 4 business days after the clinic has received the results. If you do not hear from us within 7 days, please contact the clinic through [employer] or phone. If you have a critical or abnormal lab result, we will notify you by phone as soon as possible.  Submit refill requests through [employer] or call your pharmacy and they will forward the refill request to us. Please allow 3 business days for your refill to be completed.          Additional Information About Your Visit        [employer] Information     [employer] lets you send messages to your doctor, view your test results, renew your prescriptions, schedule appointments and more. To sign up, go to [email or web address] . Click on \"Log in\" on the left side of the screen, which will take you to the Welcome page. Then click on \"Sign up Now\" on the right side of the page.     You will be asked to enter the access code listed below, as well as some personal information. Please follow the directions to create " your username and password.     Your access code is: [email or web address]  Expires: 2017 11:35 AM     Your access code will  in 90 days. If you need help or a new code, please call your [facility] clinic or [phone].        Care EveryWhere ID     This is your Care EveryWhere ID. This could be used by other organizations to access your [facility] medical records  [id number]         Blood Pressure from Last 3 Encounters:   17 110/76   16 137/62   03/26/15 133/84    Weight from Last 3 Encounters:   17 220 lb (99.8 kg)   10/22/14 249 lb (112.9 kg)   14 230 lb (104.3 kg)              Today, you had the following     No orders found for display       Primary Care Provider Office Phone # Fax #    Chantell Cook [phone]       [facility] MEDICINE [address] [facility] [address]        Equal Access to Services     [facility]: Hadii elvi ku hadasho Soomaali, waaxda luqadaha, qaybta kaalmada adeegyamarck, charles colorado . So [facility] [phone].    ATENCIÓN: Si habla español, tiene a jean disposición servicios gratuitos de asistencia lingüística. Llame al [phone].    We comply with applicable federal civil rights laws and [place] laws. We do not discriminate on the basis of race, color, national origin, age, disability, sex, sexual orientation, or gender identity.            Thank you!     Thank you for choosing [facility]  for your care. Our goal is always to provide you with excellent care. Hearing back from our patients is one way we can continue to improve our services. Please take a few minutes to complete the written survey that you may receive in the mail after your visit with us. Thank you!             Your Updated Medication List - Protect others around you: Learn how to safely use, store and throw away your medicines at www.disposemymeds.org.          This list is accurate as of: 11/3/17  1:48 PM.  Always use your  most recent med list.                   Brand Name Dispense Instructions for use Diagnosis    * albuterol (5 MG/ML) 0.5% neb solution    PROVENTIL     Take 2.5 mg by nebulization every 6 hours as needed for wheezing or shortness of breath / dyspnea        * albuterol (2.5 MG/3ML) 0.083% neb solution      Inhale 2.5 mg into the lungs as needed        * albuterol 108 (90 BASE) MCG/ACT Inhaler    PROAIR HFA/PROVENTIL HFA/VENTOLIN HFA     Inhale 2 puffs into the lungs as needed        atorvastatin 10 MG tablet    LIPITOR     Take 10 mg by mouth daily        budesonide 32 MCG/ACT spray    RINOCORT AQUA     Spray 2 sprays into both nostrils as needed        Calcium carb-Vitamin D 500 mg [place]-200 units 500-200 MG-UNIT per tablet    OSCAL with D;Oyster Shell Calcium     Take 1 tablet by mouth daily        * CELEXA PO      Take 40 mg by mouth daily        * citalopram 40 MG tablet    celeXA     Take 40 mg by mouth daily        cetirizine 10 MG tablet    zyrTEC     Take 10 mg by mouth daily        * cholecalciferol 66929 UNITS capsule    vitamin D3     Take 1 capsule by mouth daily.        * vitamin D 2000 UNITS tablet      Take 2,000 Units by mouth daily        DAILY AMINO 6000 PO      Take 625 mg by mouth daily        * diazepam 2 MG tablet    VALIUM     Take 1 tablet by mouth daily For vertigo        * diazepam 2 MG tablet    VALIUM     Take 2 mg by mouth daily        ELIDEL 1 % cream   Generic drug:  pimecrolimus      Apply  topically 2 times daily.        * EPINEPHrine 0.3 MG/0.3ML injection 2-pack    EPIPEN/ADRENACLICK/or ANY BX GENERIC EQUIV     Inject 0.3 mg into the muscle as needed        * EPIPEN 0.3 MG/0.3ML injection   Generic drug:  EPINEPHrine      Inject 0.3 mg into the muscle once as needed. For anaphylaxis        * ferrous sulfate 325 (65 FE) MG tablet    IRON     Take 325 mg by mouth daily (with breakfast)        * ferrous sulfate 325 (65 FE) MG tablet    IRON     Take 325 mg by mouth daily        FIBERCON  625 MG tablet   Generic drug:  calcium polycarbophil      Take 1 tablet by mouth 2 times daily as needed.        flaxseed oil 1000 MG Caps      Take 2,000 mg by mouth daily        * hydrochlorothiazide 25 MG tablet    HYDRODIURIL     Take 25 mg by mouth daily.        * hydrochlorothiazide 25 MG tablet    HYDRODIURIL     Take 25 mg by mouth daily        HYDROcodone-acetaminophen 5-325 MG per tablet    NORCO     Take 2 tablets by mouth every 4 hours as needed for moderate to severe pain        ipratropium - albuterol 0.5 mg/2.5 mg/3 mL 0.5-2.5 (3) MG/3ML neb solution    DUONEB    360 mL    Take 1 vial (3 mLs) by nebulization every 4 hours as needed for shortness of breath / dyspnea or wheezing        * LAC-HYDRIN 12 % lotion   Generic drug:  ammonium lactate      Apply  topically 2 times daily.        * ammonium lactate 12 % lotion    LAC-HYDRIN     Apply 1 inch topically as needed        levothyroxine 112 MCG tablet    SYNTHROID/LEVOTHROID     Take 112 mcg by mouth daily        montelukast 10 MG tablet    SINGULAIR     Take 10 mg by mouth At Bedtime        MOVANTIK 12.5 MG Tabs tablet   Generic drug:  naloxegol      Take 12.5 mg by mouth every morning (before breakfast)        multivitamin Tabs tablet      Take 1 tablet by mouth daily        olopatadine 0.6 % nasal spray    PATANASE    2 Bottle    Spray 2 sprays into both nostrils 2 times daily    Seasonal allergic rhinitis, ETD (eustachian tube dysfunction)       * PATANOL 0.1 % ophthalmic solution   Generic drug:  olopatadine      Place 1 drop into both eyes 2 times daily.        * olopatadine 0.1 % ophthalmic solution    PATANOL     Place 1 drop into both eyes daily        polyethylene glycol powder    MIRALAX/GLYCOLAX     Take 1 capful by mouth daily        * potassium chloride SA 20 MEQ CR tablet    K-DUR/KLOR-CON M     Take 40 mEq by mouth daily        * potassium chloride SA 20 MEQ CR tablet    K-DUR/KLOR-CON M     Take 2 tablets by mouth 2 times daily. With  food        PREDNISONE PO      Take by mouth daily Doesn't know mg        senna-docusate 8.6-50 MG per tablet    SENOKOT-S;PERICOLACE     Take 2 tablets by mouth daily        tolterodine 4 MG 24 hr capsule    DETROL LA     Take by mouth daily        traZODone 50 MG tablet    DESYREL     Take 50 mg by mouth daily        triamcinolone 55 MCG/ACT Inhaler    NASACORT     Spray 2 sprays into both nostrils daily        ZANAFLEX PO      Take 4 mg by mouth every 8 hours as needed for muscle spasms        * Notice:  This list has 21 medication(s) that are the same as other medications prescribed for you. Read the directions carefully, and ask your doctor or other care provider to review them with you.

## 2017-11-03 NOTE — PROGRESS NOTES
AUDIOLOGY REPORT    BACKGROUND INFORMATION: Jimena Mensah was seen in the Audiology Clinic at [facility]  on 11/3/2017 for follow-up.  The patient was fit with hearing aids  The patient reports she'd like it a little louder.        TEST RESULTS AND PROCEDURES: {The hearing aid conformity evaluation was completed at the fitting. This includes real ear measurements matching NAL-NL2 target with soft sounds audible, moderate sounds comfortable and clear, and loud sounds below discomfort. Increased gain +3dB to patient satisfaction and she has volume increase in toggle. Encouraged to wear longer than 5 hours per day.    SUMMARY AND RECOMMENDATIONS: A hearing aid check was performed today and the patient reports no other concerns.  Fit is good.  Adjustments were made as noted above and the patient will return for follow-up as needed or in 12 months.  Call this clinic with questions regarding today s visit.    Genesis Bailey M.S., [facility]  Audiologist #[id number]

## 2017-11-07 ENCOUNTER — HOSPITAL ENCOUNTER (OUTPATIENT)
Dept: MRI IMAGING | Facility: HOSPITAL | Age: 62
Discharge: HOME | End: 2017-11-07
Attending: SPECIALIST | Admitting: SPECIALIST
Payer: MEDICAID

## 2017-11-07 ENCOUNTER — HOSPITAL ENCOUNTER (OUTPATIENT)
Dept: MRI IMAGING | Facility: HOSPITAL | Age: 62
End: 2017-11-07
Attending: SPECIALIST
Payer: MEDICAID

## 2017-11-07 DIAGNOSIS — M48.061 SPINAL STENOSIS, LUMBAR: ICD-10-CM

## 2017-11-07 DIAGNOSIS — M48.02 SPINAL STENOSIS IN CERVICAL REGION: ICD-10-CM

## 2017-11-07 PROCEDURE — A9585 GADOBUTROL INJECTION: HCPCS | Performed by: RADIOLOGY

## 2017-11-07 PROCEDURE — 72141 MRI NECK SPINE W/O DYE: CPT | Mod: TC

## 2017-11-07 PROCEDURE — 25000128 H RX IP 250 OP 636: Performed by: RADIOLOGY

## 2017-11-07 PROCEDURE — 72158 MRI LUMBAR SPINE W/O & W/DYE: CPT | Mod: TC

## 2017-11-07 RX ORDER — GADOBUTROL 604.72 MG/ML
10 INJECTION INTRAVENOUS ONCE
Status: COMPLETED | OUTPATIENT
Start: 2017-11-07 | End: 2017-11-07

## 2017-11-07 RX ADMIN — GADOBUTROL 10 ML: 604.72 INJECTION INTRAVENOUS at 13:40

## 2017-12-28 ENCOUNTER — ALLIED HEALTH/NURSE VISIT (OUTPATIENT)
Dept: AUDIOLOGY | Facility: OTHER | Age: 62
End: 2017-12-28
Attending: AUDIOLOGIST
Payer: MEDICAID

## 2017-12-28 DIAGNOSIS — H90.3 SENSORINEURAL HEARING LOSS, BILATERAL: ICD-10-CM

## 2017-12-28 PROCEDURE — V5266 BATTERY FOR HEARING DEVICE: HCPCS

## 2018-04-03 ENCOUNTER — ALLIED HEALTH/NURSE VISIT (OUTPATIENT)
Dept: AUDIOLOGY | Facility: OTHER | Age: 63
End: 2018-04-03
Attending: AUDIOLOGIST
Payer: MEDICAID

## 2018-04-03 DIAGNOSIS — H90.3 SENSORINEURAL HEARING LOSS, BILATERAL: ICD-10-CM

## 2018-04-03 PROCEDURE — V5266 BATTERY FOR HEARING DEVICE: HCPCS

## 2018-07-06 ENCOUNTER — ALLIED HEALTH/NURSE VISIT (OUTPATIENT)
Dept: AUDIOLOGY | Facility: OTHER | Age: 63
End: 2018-07-06
Attending: AUDIOLOGIST
Payer: MEDICAID

## 2018-07-06 DIAGNOSIS — H90.3 SENSORINEURAL HEARING LOSS, BILATERAL: ICD-10-CM

## 2018-07-06 PROCEDURE — V5266 BATTERY FOR HEARING DEVICE: HCPCS

## 2018-10-10 ENCOUNTER — TRANSFERRED RECORDS (OUTPATIENT)
Dept: HEALTH INFORMATION MANAGEMENT | Facility: HOSPITAL | Age: 63
End: 2018-10-10

## 2018-10-12 RX ORDER — TRIAMCINOLONE ACETONIDE 1 MG/G
CREAM TOPICAL 4 TIMES DAILY PRN
COMMUNITY

## 2018-10-12 RX ORDER — BISACODYL 5 MG/1
5 TABLET, DELAYED RELEASE ORAL DAILY PRN
COMMUNITY
End: 2020-01-01

## 2018-10-12 RX ORDER — CHLORHEXIDINE GLUCONATE ORAL RINSE 1.2 MG/ML
15 SOLUTION DENTAL 2 TIMES DAILY
COMMUNITY
End: 2020-01-01

## 2018-10-12 RX ORDER — OXYCODONE AND ACETAMINOPHEN 10; 325 MG/1; MG/1
1 TABLET ORAL EVERY 6 HOURS PRN
COMMUNITY
End: 2020-01-01

## 2018-10-12 RX ORDER — NYSTATIN AND TRIAMCINOLONE ACETONIDE 100000; 1 [USP'U]/G; MG/G
CREAM TOPICAL 2 TIMES DAILY
Status: ON HOLD | COMMUNITY
End: 2018-11-06

## 2018-10-12 RX ORDER — DICLOFENAC SODIUM AND MISOPROSTOL 75; 200 MG/1; UG/1
1 TABLET, DELAYED RELEASE ORAL 2 TIMES DAILY
COMMUNITY

## 2018-10-15 ENCOUNTER — TRANSFERRED RECORDS (OUTPATIENT)
Dept: HEALTH INFORMATION MANAGEMENT | Facility: HOSPITAL | Age: 63
End: 2018-10-15

## 2018-10-15 ENCOUNTER — ANESTHESIA EVENT (OUTPATIENT)
Dept: SURGERY | Facility: HOSPITAL | Age: 63
End: 2018-10-15
Payer: MEDICAID

## 2018-10-15 ENCOUNTER — ALLIED HEALTH/NURSE VISIT (OUTPATIENT)
Dept: AUDIOLOGY | Facility: OTHER | Age: 63
End: 2018-10-15
Attending: AUDIOLOGIST
Payer: MEDICAID

## 2018-10-15 DIAGNOSIS — H90.3 SENSORINEURAL HEARING LOSS, BILATERAL: ICD-10-CM

## 2018-10-15 PROCEDURE — V5266 BATTERY FOR HEARING DEVICE: HCPCS

## 2018-10-15 RX ORDER — ONDANSETRON 4 MG/1
4 TABLET, ORALLY DISINTEGRATING ORAL EVERY 30 MIN PRN
Status: CANCELLED | OUTPATIENT
Start: 2018-10-15

## 2018-10-15 RX ORDER — MEPERIDINE HYDROCHLORIDE 50 MG/ML
12.5 INJECTION INTRAMUSCULAR; INTRAVENOUS; SUBCUTANEOUS
Status: CANCELLED | OUTPATIENT
Start: 2018-10-15

## 2018-10-15 RX ORDER — SODIUM CHLORIDE, SODIUM LACTATE, POTASSIUM CHLORIDE, CALCIUM CHLORIDE 600; 310; 30; 20 MG/100ML; MG/100ML; MG/100ML; MG/100ML
INJECTION, SOLUTION INTRAVENOUS CONTINUOUS
Status: CANCELLED | OUTPATIENT
Start: 2018-10-15

## 2018-10-15 RX ORDER — ONDANSETRON 2 MG/ML
4 INJECTION INTRAMUSCULAR; INTRAVENOUS EVERY 30 MIN PRN
Status: CANCELLED | OUTPATIENT
Start: 2018-10-15

## 2018-10-15 RX ORDER — NALOXONE HYDROCHLORIDE 0.4 MG/ML
.1-.4 INJECTION, SOLUTION INTRAMUSCULAR; INTRAVENOUS; SUBCUTANEOUS
Status: CANCELLED | OUTPATIENT
Start: 2018-10-15 | End: 2018-10-16

## 2018-10-15 ASSESSMENT — COPD QUESTIONNAIRES
CAT_SEVERITY: MILD
COPD: 1

## 2018-10-15 ASSESSMENT — LIFESTYLE VARIABLES: TOBACCO_USE: 1

## 2018-10-15 NOTE — ANESTHESIA PREPROCEDURE EVALUATION
Anesthesia Evaluation     . Pt has had prior anesthetic. Type: General    No history of anesthetic complications          ROS/MED HX    ENT/Pulmonary:     (+)tobacco use, Past use mild COPD, , . .    Neurologic:  - neg neurologic ROS     Cardiovascular:     (+) Dyslipidemia, hypertension-range: 130-155 systolic, ---. : . . . :. .       METS/Exercise Tolerance:     Hematologic:  - neg hematologic  ROS       Musculoskeletal:   (+) arthritis, , , -       GI/Hepatic:     (+) hepatitis type Alcoholic, liver disease,       Renal/Genitourinary:  - ROS Renal section negative       Endo:     (+) thyroid problem hypothyroidism, Obesity, .      Psychiatric:  - neg psychiatric ROS       Infectious Disease:  - neg infectious disease ROS       Malignancy:   (+) Malignancy History of GI  GI CA Remission status post, Other CA colon status post         Other:    - neg other ROS                 Physical Exam  Normal systems: dental    Airway   Mallampati: II  TM distance: >3 FB  Neck ROM: limited    Dental     Cardiovascular   Rhythm and rate: regular and normal      Pulmonary    breath sounds clear to auscultation                    Anesthesia Plan      History & Physical Review  History and physical reviewed and following examination; no interval change.    ASA Status:  3 .    NPO Status:  > 8 hours    Plan for MAC Reason for MAC:  Difficulty with conscious sedation (QS)  PONV prophylaxis:  Ondansetron (or other 5HT-3)       Postoperative Care      Consents  Anesthetic plan, risks, benefits and alternatives discussed with:  Patient..                          .

## 2018-10-16 ENCOUNTER — HOSPITAL ENCOUNTER (OUTPATIENT)
Facility: HOSPITAL | Age: 63
Discharge: HOME | End: 2018-10-16
Attending: OPHTHALMOLOGY | Admitting: OPHTHALMOLOGY
Payer: MEDICAID

## 2018-10-16 ENCOUNTER — ANESTHESIA (OUTPATIENT)
Dept: SURGERY | Facility: HOSPITAL | Age: 63
End: 2018-10-16
Payer: MEDICAID

## 2018-10-16 VITALS
OXYGEN SATURATION: 95 % | DIASTOLIC BLOOD PRESSURE: 93 MMHG | TEMPERATURE: 97.6 F | RESPIRATION RATE: 18 BRPM | SYSTOLIC BLOOD PRESSURE: 155 MMHG | WEIGHT: 218 LBS | BODY MASS INDEX: 33.15 KG/M2

## 2018-10-16 PROCEDURE — V2632 POST CHMBR INTRAOCULAR LENS: HCPCS | Performed by: OPHTHALMOLOGY

## 2018-10-16 PROCEDURE — 25000125 ZZHC RX 250: Performed by: OPHTHALMOLOGY

## 2018-10-16 PROCEDURE — 40000306 ZZH STATISTIC PRE PROC ASSESS II: Performed by: OPHTHALMOLOGY

## 2018-10-16 PROCEDURE — 93005 ELECTROCARDIOGRAM TRACING: CPT

## 2018-10-16 PROCEDURE — 25000128 H RX IP 250 OP 636: Performed by: NURSE ANESTHETIST, CERTIFIED REGISTERED

## 2018-10-16 PROCEDURE — 66984 XCAPSL CTRC RMVL W/O ECP: CPT | Performed by: ANESTHESIOLOGY

## 2018-10-16 PROCEDURE — 25000128 H RX IP 250 OP 636: Performed by: OPHTHALMOLOGY

## 2018-10-16 PROCEDURE — 40000065 ZZH STATISTIC EKG NON-CHARGEABLE

## 2018-10-16 PROCEDURE — 37000008 ZZH ANESTHESIA TECHNICAL FEE, 1ST 30 MIN: Performed by: OPHTHALMOLOGY

## 2018-10-16 PROCEDURE — 27210794 ZZH OR GENERAL SUPPLY STERILE: Performed by: OPHTHALMOLOGY

## 2018-10-16 PROCEDURE — 36000056 ZZH SURGERY LEVEL 3 1ST 30 MIN: Performed by: OPHTHALMOLOGY

## 2018-10-16 PROCEDURE — 66984 XCAPSL CTRC RMVL W/O ECP: CPT | Performed by: NURSE ANESTHETIST, CERTIFIED REGISTERED

## 2018-10-16 PROCEDURE — 71000027 ZZH RECOVERY PHASE 2 EACH 15 MINS: Performed by: OPHTHALMOLOGY

## 2018-10-16 RX ORDER — CYCLOPENTOLATE HYDROCHLORIDE 10 MG/ML
1 SOLUTION/ DROPS OPHTHALMIC
Status: COMPLETED | OUTPATIENT
Start: 2018-10-16 | End: 2018-10-16

## 2018-10-16 RX ORDER — PREDNISOLONE ACETATE 10 MG/ML
SUSPENSION/ DROPS OPHTHALMIC PRN
Status: DISCONTINUED | OUTPATIENT
Start: 2018-10-16 | End: 2018-10-16 | Stop reason: HOSPADM

## 2018-10-16 RX ORDER — FLURBIPROFEN SODIUM 0.3 MG/ML
1 SOLUTION/ DROPS OPHTHALMIC
Status: ACTIVE | OUTPATIENT
Start: 2018-10-16 | End: 2018-10-16

## 2018-10-16 RX ORDER — PHENYLEPHRINE HYDROCHLORIDE 25 MG/ML
1 SOLUTION/ DROPS OPHTHALMIC
Status: COMPLETED | OUTPATIENT
Start: 2018-10-16 | End: 2018-10-16

## 2018-10-16 RX ORDER — FLURBIPROFEN SODIUM 0.3 MG/ML
1 SOLUTION/ DROPS OPHTHALMIC
Status: COMPLETED | OUTPATIENT
Start: 2018-10-16 | End: 2018-10-16

## 2018-10-16 RX ORDER — MOXIFLOXACIN 5 MG/ML
1 SOLUTION/ DROPS OPHTHALMIC
Status: DISCONTINUED | OUTPATIENT
Start: 2018-10-16 | End: 2018-10-16 | Stop reason: HOSPADM

## 2018-10-16 RX ORDER — DICLOFENAC SODIUM 1 MG/ML
1 SOLUTION/ DROPS OPHTHALMIC
Status: DISCONTINUED | OUTPATIENT
Start: 2018-10-16 | End: 2018-10-16 | Stop reason: ALTCHOICE

## 2018-10-16 RX ORDER — MOXIFLOXACIN 5 MG/ML
SOLUTION/ DROPS OPHTHALMIC PRN
Status: DISCONTINUED | OUTPATIENT
Start: 2018-10-16 | End: 2018-10-16 | Stop reason: HOSPADM

## 2018-10-16 RX ORDER — PROPARACAINE HYDROCHLORIDE 5 MG/ML
1 SOLUTION/ DROPS OPHTHALMIC ONCE
Status: COMPLETED | OUTPATIENT
Start: 2018-10-16 | End: 2018-10-16

## 2018-10-16 RX ORDER — TETRACAINE HYDROCHLORIDE 5 MG/ML
SOLUTION OPHTHALMIC PRN
Status: DISCONTINUED | OUTPATIENT
Start: 2018-10-16 | End: 2018-10-16 | Stop reason: HOSPADM

## 2018-10-16 RX ORDER — SODIUM CHLORIDE, SODIUM LACTATE, POTASSIUM CHLORIDE, CALCIUM CHLORIDE 600; 310; 30; 20 MG/100ML; MG/100ML; MG/100ML; MG/100ML
INJECTION, SOLUTION INTRAVENOUS CONTINUOUS
Status: DISCONTINUED | OUTPATIENT
Start: 2018-10-16 | End: 2018-10-16 | Stop reason: HOSPADM

## 2018-10-16 RX ADMIN — PROPARACAINE HYDROCHLORIDE 1 DROP: 5 SOLUTION/ DROPS OPHTHALMIC at 08:14

## 2018-10-16 RX ADMIN — FLURBIPROFEN SODIUM 1 DROP: 0.3 SOLUTION/ DROPS OPHTHALMIC at 08:26

## 2018-10-16 RX ADMIN — FLURBIPROFEN SODIUM 1 DROP: 0.3 SOLUTION/ DROPS OPHTHALMIC at 08:15

## 2018-10-16 RX ADMIN — CYCLOPENTOLATE HYDROCHLORIDE 1 DROP: 10 SOLUTION/ DROPS OPHTHALMIC at 08:15

## 2018-10-16 RX ADMIN — MIDAZOLAM 1 MG: 1 INJECTION INTRAMUSCULAR; INTRAVENOUS at 08:54

## 2018-10-16 RX ADMIN — MOXIFLOXACIN HYDROCHLORIDE 1 DROP: 5 SOLUTION/ DROPS OPHTHALMIC at 08:15

## 2018-10-16 RX ADMIN — FLURBIPROFEN SODIUM 1 DROP: 0.3 SOLUTION/ DROPS OPHTHALMIC at 08:21

## 2018-10-16 RX ADMIN — MOXIFLOXACIN HYDROCHLORIDE 1 DROP: 5 SOLUTION/ DROPS OPHTHALMIC at 08:21

## 2018-10-16 RX ADMIN — PHENYLEPHRINE HYDROCHLORIDE 1 DROP: 25 SOLUTION/ DROPS OPHTHALMIC at 08:14

## 2018-10-16 RX ADMIN — PHENYLEPHRINE HYDROCHLORIDE 1 DROP: 25 SOLUTION/ DROPS OPHTHALMIC at 08:26

## 2018-10-16 RX ADMIN — PHENYLEPHRINE HYDROCHLORIDE 1 DROP: 25 SOLUTION/ DROPS OPHTHALMIC at 08:21

## 2018-10-16 RX ADMIN — CYCLOPENTOLATE HYDROCHLORIDE 1 DROP: 10 SOLUTION/ DROPS OPHTHALMIC at 08:26

## 2018-10-16 RX ADMIN — CYCLOPENTOLATE HYDROCHLORIDE 1 DROP: 10 SOLUTION/ DROPS OPHTHALMIC at 08:21

## 2018-10-16 NOTE — DISCHARGE INSTRUCTIONS
AFTER CATARACT SURGERY RESTRICTIONS    SHIELD: WEAR OVER SURGICAL EYE AT NIGHT FOR 1 WEEK    ACTIVITY/LIFTING: Avoid activities, which increase abdominal/head pressure such as pulling on a starter cord, heavy lifting (over 15-20 lbs) or bending with the head below the waist, for 1 week. Avoid sudden jerky movements (chopping, shoveling) for 1 week. Waking and lower body exercise such as biking is okay.     WATER: Showering/washing hair is okay the day after surgery, but avoid excess water, soap, or shampoo in your eyes. Clean eyelids gently with a clean, wet washcloth as needed. Avoid pressure on the eye.     SUNLIGHT: If the eye is light sensitive after surgery, dark glasses may be worn.     DIET/MEDICATIONS: Resume you usual diet and medications your family doctor ehas prescribed. Continue to use/follow the instructions for your eye drops.     DRIVING: Avoid driving with a patch. Resume driving when you feel safe, but don't drive on the day of surgery.    PAIN: Minor pain and itching is normal during healing. DO NOT RUB THE EYE! Report any unusual swelling, increased discharge or pain that is not relieved by Tylenol (or equivalent). If sudden drop/change in vision call immediately. [facility] [phone]    CONTINUE TO USE ALL THE EYE DROPS PER THE INSTRUCTIONS ORDERED BY DR. RAMOS'S TECHNICIANS    FOR EMERGENCY FOLLOW EYE DROP INSTRUCTIONS GIVEN BY 's OFFICE      Post-Anesthesia Patient Instructions    IMMEDIATELY FOLLOWING SURGERY:  Do not drive or operate machinery for the first twenty four hours after surgery.  Do not make any important decisions for twenty four hours after surgery or while taking narcotic pain medications or sedatives.  If you develop intractable nausea and vomiting or a severe headache please notify your doctor immediately.    FOLLOW-UP:  Please make an appointment with your surgeon as instructed. You do not need to follow up with anesthesia unless specifically  instructed to do so.    WOUND CARE INSTRUCTIONS (if applicable):  Keep a dry clean dressing on the anesthesia/puncture wound site if there is drainage.  Once the wound has quit draining you may leave it open to air.  Generally you should leave the bandage intact for twenty four hours unless there is drainage.  If the epidural site drains for more than 36-48 hours please call the anesthesia department.    QUESTIONS?:  Please feel free to call your physician or the hospital  if you have any questions, and they will be happy to assist you.

## 2018-10-16 NOTE — IP AVS SNAPSHOT
HI Preop/Phase II    [address] [address]    Phone:  [phone]                                       After Visit Summary   10/16/2018    Jimena Mensah    MRN: [id number]           After Visit Summary Signature Page     I have received my discharge instructions, and my questions have been answered. I have discussed any challenges I see with this plan with the nurse or doctor.    ..........................................................................................................................................  Patient/Patient Representative Signature      ..........................................................................................................................................  Patient Representative Print Name and Relationship to Patient    ..................................................               ................................................  Date                                   Time    ..........................................................................................................................................  Reviewed by Signature/Title    ...................................................              ..............................................  Date                                               Time          22EPIC Rev 08/18

## 2018-10-16 NOTE — OR NURSING
Took juice and cookies w/o nausea.Apparent confusion about drops.Re-instructed and PCA called.Instructed to bring drops and instructions to follow up for clarification.Patient and responsible adult given discharge instructions with no questions regarding instructions. Irineo score 20. Pain level 0/10.  Discharged from unit via walking. Patient discharged to home with healthline transport.

## 2018-10-16 NOTE — OP NOTE
Ophthalmology Operative Note    DATE OF SERVICE:  10/16/2018.     PREOPERATIVE DIAGNOSIS:     Cataract, Left eye.       POSTOPERATIVE DIAGNOSIS:  Cataract, Left eye.       SURGEON: Jamarcus Pham MD    PROCEDURE:  Phacoemulsification with intraocular lens implant, Model PCB00, 32.50 diopter power.     ANESTHESIA:  Topical with IV sedation. Combined MAC with Topical.       INDICATION: Jimena Mensah is a 63 year old female with a history of a visually significant cataract of the Left eye that is interfering with activities of daily living.      DESCRIPTION OF PROCEDURE:   Operative risks, benefits and alternatives to the procedure were discussed at length with Jimena Mensah including infection, bleeding, retinal detachment, loss of vision and loss of the eye.  Patient voiced understanding and informed consent was signed.  The patient was taken to the operating suite, where an appropriate level of anesthesia was given.  Attention was placed to the operative eye.  The patient was then prepped and draped in the usual sterile ophthalmic manner with 5% povidone iodine solution.  A lid speculum was placed in the eye to provide exposure and MVR blade was used to make a paracentesis.  Once this was performed, Shugarcaine was then placed intracamerally.  This was then followed by intracameral Viscoat.  A 2.4 mm blade was then used to make a biplanar temporal clear corneal incision.  A cystotome and uttrata were used to make a continuous curvilinear capsulorrhexis. Balanced salt solution on flat cannula was then used to hydrodissect the lens.  Phacoemulsification was then performed in a divide-and-conquer technique to remove all pieces of the nucleus.  Irrigation aspiration was then used to remove all remaining cortical material and debris.  Amvisc was then used to fill the capsular bag.  A PCB00 32.50 diopter lens was then injected into the capsular bag using the injector for a target refraction of -0.40 (William  Q).  Once this was performed, a secondary instrument was used to rotate the lens into proper position.  Irrigation aspiration was then used to remove all remaining viscoelastic material and center the lens appropriately.  Balanced salt solution on 30 gauge cannula was then used to hydrate both wounds.  A Weck-Dara was used to assess intraocular pressure and wound leakage.  Once this was stable and the lens was found to be in good position, the patient was undraped.  The patient was brought to the recovery area in stable condition.  Family was made aware of the patient's condition and the patient will follow up with us later this afternoon.  No complications.     IMPLANTS:    Implant Name Type Inv. Item Serial No.  Lot No. LRB No. Used   PCB00 Intraocular Lens     [id number]     Left 1

## 2018-10-16 NOTE — IP AVS SNAPSHOT
MRN:[id number]                      After Visit Summary   10/16/2018    Jimena Mensah    MRN: [id number]           Thank you!     Thank you for choosing [facility] for your care. Our goal is always to provide you with excellent care. Hearing back from our patients is one way we can continue to improve our services. Please take a few minutes to complete the written survey that you may receive in the mail after you visit with us. Thank you!        Patient Information     Date Of Birth          1955        About your hospital stay     You were admitted on:  October 16, 2018 You last received care in the:  [facility] Preop/Phase II    You were discharged on:  October 16, 2018       Who to Call     For medical emergencies, please call 911.  For non-urgent questions about your medical care, please call your primary care provider or clinic, [phone]  For questions related to your surgery, please call your surgery clinic        Attending Provider     Provider Specialty    Jamarcus Pham MD Ophthalmology       Primary Care Provider Office Phone # Fax #    Chantell MAYANK Cook [phone]      Further instructions from your care team       AFTER CATARACT SURGERY RESTRICTIONS    SHIELD: WEAR OVER SURGICAL EYE AT NIGHT FOR 1 WEEK    ACTIVITY/LIFTING: Avoid activities, which increase abdominal/head pressure such as pulling on a starter cord, heavy lifting (over 15-20 lbs) or bending with the head below the waist, for 1 week. Avoid sudden jerky movements (chopping, shoveling) for 1 week. Waking and lower body exercise such as biking is okay.     WATER: Showering/washing hair is okay the day after surgery, but avoid excess water, soap, or shampoo in your eyes. Clean eyelids gently with a clean, wet washcloth as needed. Avoid pressure on the eye.     SUNLIGHT: If the eye is light sensitive after surgery, dark glasses may be worn.     DIET/MEDICATIONS: Resume you usual diet and  medications your family doctor ehas prescribed. Continue to use/follow the instructions for your eye drops.     DRIVING: Avoid driving with a patch. Resume driving when you feel safe, but don't drive on the day of surgery.    PAIN: Minor pain and itching is normal during healing. DO NOT RUB THE EYE! Report any unusual swelling, increased discharge or pain that is not relieved by Tylenol (or equivalent). If sudden drop/change in vision call immediately. [facility] [phone]    CONTINUE TO USE ALL THE EYE DROPS PER THE INSTRUCTIONS ORDERED BY DR. RAMOS'S TECHNICIANS    FOR EMERGENCY FOLLOW EYE DROP INSTRUCTIONS GIVEN BY 's OFFICE      Post-Anesthesia Patient Instructions    IMMEDIATELY FOLLOWING SURGERY:  Do not drive or operate machinery for the first twenty four hours after surgery.  Do not make any important decisions for twenty four hours after surgery or while taking narcotic pain medications or sedatives.  If you develop intractable nausea and vomiting or a severe headache please notify your doctor immediately.    FOLLOW-UP:  Please make an appointment with your surgeon as instructed. You do not need to follow up with anesthesia unless specifically instructed to do so.    WOUND CARE INSTRUCTIONS (if applicable):  Keep a dry clean dressing on the anesthesia/puncture wound site if there is drainage.  Once the wound has quit draining you may leave it open to air.  Generally you should leave the bandage intact for twenty four hours unless there is drainage.  If the epidural site drains for more than 36-48 hours please call the anesthesia department.    QUESTIONS?:  Please feel free to call your physician or the hospital  if you have any questions, and they will be happy to assist you.       Pending Results     No orders found from 10/14/2018 to 10/17/2018.            Admission Information     Date & Time Provider Department Dept. Phone    10/16/2018 Jamarcus Ramos MD [facility]  "Preop/Phase -[phone]      Your Vitals Were     Blood Pressure Respirations Weight Pulse Oximetry BMI (Body Mass Index)       147/83 16 98.9 kg (218 lb) 93% 33.15 kg/m2       [email or web address] Information     [email or web address] lets you send messages to your doctor, view your test results, renew your prescriptions, schedule appointments and more. To sign up, go to [email or web address] . Click on \"Log in\" on the left side of the screen, which will take you to the Welcome page. Then click on \"Sign up Now\" on the right side of the page.     You will be asked to enter the access code listed below, as well as some personal information. Please follow the directions to create your username and password.     Your access code is: [email or web address]  Expires: 2019  2:49 PM     Your access code will  in 90 days. If you need help or a new code, please call your [place] clinic or [phone].        Care EveryWhere ID     This is your Care EveryWhere ID. This could be used by other organizations to access your [place] medical records  [id number]        Equal Access to Services     JET PALACIOS AH: Hadii elvi Euceda, waaddi tidwell, qatanisha kaalmamarck flores, charles mariano. So [facility] [phone].    ATENCIÓN: Si habla español, tiene a jean disposición servicios gratuitos de asistencia lingüística. Santosh al [phone].    We comply with applicable federal civil rights laws and [place] laws. We do not discriminate on the basis of race, color, national origin, age, disability, sex, sexual orientation, or gender identity.               Review of your medicines      CONTINUE these medicines which have NOT CHANGED        Dose / Directions    * albuterol (5 MG/ML) 0.5% neb solution   Commonly known as:  PROVENTIL        Dose:  2.5 mg   Take 2.5 mg by nebulization every 6 hours as needed for wheezing or shortness of breath / dyspnea   Refills:  0       * albuterol (2.5 MG/3ML) 0.083% neb solution     "    Dose:  2.5 mg   Inhale 2.5 mg into the lungs as needed   Refills:  0       * albuterol 108 (90 Base) MCG/ACT inhaler   Commonly known as:  PROAIR HFA/PROVENTIL HFA/VENTOLIN HFA        Dose:  2 puff   Inhale 2 puffs into the lungs as needed   Refills:  0       ARTHROTEC 75 75-0.2 MG per EC tablet   Generic drug:  diclofenac-misoprostol        Dose:  1 tablet   Take 1 tablet by mouth 2 times daily   Refills:  0       atorvastatin 10 MG tablet   Commonly known as:  LIPITOR        Dose:  10 mg   Take 10 mg by mouth daily   Refills:  0       bisacodyl 5 MG EC tablet   Commonly known as:  DULCOLAX        Dose:  5 mg   Take 5 mg by mouth daily as needed for constipation   Refills:  0       budesonide 32 MCG/ACT spray   Commonly known as:  RINOCORT AQUA        Dose:  2 spray   Spray 2 sprays into both nostrils as needed   Refills:  0       calcium carbonate 500 mg-vitamin D 200 units 500-200 MG-UNIT per tablet   Commonly known as:  OSCAL with D;OYSTER SHELL CALCIUM        Dose:  1 tablet   Take 1 tablet by mouth daily   Refills:  0       * CELEXA PO        Dose:  40 mg   Take 40 mg by mouth daily   Refills:  0       * citalopram 40 MG tablet   Commonly known as:  celeXA        Dose:  40 mg   Take 40 mg by mouth daily   Refills:  0       cetirizine 10 MG tablet   Commonly known as:  zyrTEC        Dose:  10 mg   Take 10 mg by mouth daily   Refills:  0       chlorhexidine 0.12 % solution   Commonly known as:  PERIDEX        Dose:  15 mL   Take 15 mLs by mouth 2 times daily   Refills:  0       * cholecalciferol 74914 units capsule   Commonly known as:  vitamin D3        Dose:  1 capsule   Take 1 capsule by mouth daily.   Refills:  0       * vitamin D 2000 units tablet        Dose:  2000 Units   Take 2,000 Units by mouth daily   Refills:  0       DAILY AMINO 6000 PO        Dose:  625 mg   Take 625 mg by mouth daily   Refills:  0       diazepam 2 MG tablet   Commonly known as:  VALIUM        Dose:  1 tablet   Take 1 tablet by  mouth daily as needed For vertigo   Refills:  0       ELIDEL 1 % cream   Generic drug:  pimecrolimus        Apply  topically 2 times daily.   Refills:  0       * EPINEPHrine 0.3 MG/0.3ML injection 2-pack   Commonly known as:  EPIPEN/ADRENACLICK/or ANY BX GENERIC EQUIV        Dose:  0.3 mg   Inject 0.3 mg into the muscle as needed   Refills:  0       * EPIPEN 0.3 MG/0.3ML injection   Generic drug:  EPINEPHrine        Dose:  0.3 mg   Inject 0.3 mg into the muscle once as needed. For anaphylaxis   Refills:  0       * ferrous sulfate 325 (65 Fe) MG tablet   Commonly known as:  IRON        Dose:  325 mg   Take 325 mg by mouth daily (with breakfast)   Refills:  0       * ferrous sulfate 325 (65 Fe) MG tablet   Commonly known as:  IRON        Dose:  325 mg   Take 325 mg by mouth daily   Refills:  0       FIBERCON 625 MG tablet   Generic drug:  calcium polycarbophil        Dose:  1 tablet   Take 1 tablet by mouth 2 times daily as needed.   Refills:  0       flaxseed oil 1000 MG Caps        Dose:  2000 mg   Take 2,000 mg by mouth daily   Refills:  0       GABAPENTIN PO        Dose:  100 mg   Take 100 mg by mouth At Bedtime   Refills:  0       * hydrochlorothiazide 25 MG tablet   Commonly known as:  HYDRODIURIL        Dose:  25 mg   Take 25 mg by mouth daily.   Refills:  0       * hydrochlorothiazide 25 MG tablet   Commonly known as:  HYDRODIURIL        Dose:  25 mg   Take 25 mg by mouth daily   Refills:  0       HYDROcodone-acetaminophen 5-325 MG per tablet   Commonly known as:  NORCO        Dose:  2 tablet   Take 2 tablets by mouth every 4 hours as needed for moderate to severe pain   Refills:  0       ipratropium - albuterol 0.5 mg/2.5 mg/3 mL 0.5-2.5 (3) MG/3ML neb solution   Commonly known as:  DUONEB        Dose:  1 vial   Take 1 vial (3 mLs) by nebulization every 4 hours as needed for shortness of breath / dyspnea or wheezing   Quantity:  360 mL   Refills:  0       * LAC-HYDRIN 12 % lotion   Generic drug:  ammonium  lactate        Apply  topically 2 times daily.   Refills:  0       * ammonium lactate 12 % lotion   Commonly known as:  LAC-HYDRIN        Dose:  1 inch   Apply 1 inch topically as needed   Refills:  0       levothyroxine 112 MCG tablet   Commonly known as:  SYNTHROID/LEVOTHROID        Dose:  112 mcg   Take 112 mcg by mouth daily   Refills:  0       montelukast 10 MG tablet   Commonly known as:  SINGULAIR        Dose:  10 mg   Take 10 mg by mouth At Bedtime   Refills:  0       MOVANTIK 12.5 MG Tabs tablet   Generic drug:  naloxegol        Dose:  12.5 mg   Take 12.5 mg by mouth every morning (before breakfast)   Refills:  0       multivitamin Tabs tablet        Dose:  1 tablet   Take 1 tablet by mouth daily   Refills:  0       nystatin-triamcinolone cream   Commonly known as:  MYCOLOG II        Apply topically 2 times daily   Refills:  0       olopatadine 0.6 % nasal spray   Commonly known as:  PATANASE   Used for:  Seasonal allergic rhinitis, ETD (eustachian tube dysfunction)        Dose:  2 spray   Spray 2 sprays into both nostrils 2 times daily   Quantity:  2 Bottle   Refills:  11       oxyCODONE-acetaminophen  MG per tablet   Commonly known as:  PERCOCET        Dose:  1 tablet   Take 1 tablet by mouth every 6 hours as needed for severe pain   Refills:  0       * PATANOL 0.1 % ophthalmic solution   Generic drug:  olopatadine        Dose:  1 drop   Place 1 drop into both eyes 2 times daily.   Refills:  0       * olopatadine 0.1 % ophthalmic solution   Commonly known as:  PATANOL        Dose:  1 drop   Place 1 drop into both eyes daily   Refills:  0       polyethylene glycol powder   Commonly known as:  MIRALAX/GLYCOLAX        Dose:  1 capful   Take 1 capful by mouth daily   Refills:  0       * potassium chloride SA 20 MEQ CR tablet   Commonly known as:  K-DUR/KLOR-CON M        Dose:  40 mEq   Take 40 mEq by mouth daily   Refills:  0       * potassium chloride SA 20 MEQ CR tablet   Commonly known as:   K-DUR/KLOR-CON M        Dose:  2 tablet   Take 2 tablets by mouth 2 times daily. With food   Refills:  0       PREDNISONE PO        Take by mouth daily Doesn't know mg   Refills:  0       senna-docusate 8.6-50 MG per tablet   Commonly known as:  SENOKOT-S;PERICOLACE        Dose:  2 tablet   Take 2 tablets by mouth daily   Refills:  0       tolterodine 4 MG 24 hr capsule   Commonly known as:  DETROL LA        Take by mouth daily   Refills:  0       traZODone 50 MG tablet   Commonly known as:  DESYREL        Dose:  50 mg   Take 50 mg by mouth daily   Refills:  0       triamcinolone 0.1 % cream   Commonly known as:  KENALOG        Apply topically 2 times daily   Refills:  0       triamcinolone 55 MCG/ACT inhaler   Commonly known as:  NASACORT        Dose:  2 spray   Spray 2 sprays into both nostrils daily   Refills:  0       ZANAFLEX PO        Dose:  4 mg   Take 4 mg by mouth every 8 hours as needed for muscle spasms   Refills:  0       * Notice:  This list has 19 medication(s) that are the same as other medications prescribed for you. Read the directions carefully, and ask your doctor or other care provider to review them with you.             Protect others around you: Learn how to safely use, store and throw away your medicines at www.disposemymeds.org.        Information about OPIOIDS     PRESCRIPTION OPIOIDS: WHAT YOU NEED TO KNOW   We gave you an opioid (narcotic) pain medicine. It is important to manage your pain, but opioids are not always the best choice. You should first try all the other options your care team gave you. Take this medicine for as short a time (and as few doses) as possible.    Some activities can increase your pain, such as bandage changes or therapy sessions. It may help to take your pain medicine 30 to 60 minutes before these activities. Reduce your stress by getting enough sleep, working on hobbies you enjoy and practicing relaxation or meditation. Talk to your care team about ways to  manage your pain beyond prescription opioids.    These medicines have risks:    DO NOT drive when on new or higher doses of pain medicine. These medicines can affect your alertness and reaction times, and you could be arrested for driving under the influence (DUI). If you need to use opioids long-term, talk to your care team about driving.    DO NOT operate heavy machinery    DO NOT do any other dangerous activities while taking these medicines.    DO NOT drink any alcohol while taking these medicines.     If the opioid prescribed includes acetaminophen, DO NOT take with any other medicines that contain acetaminophen. Read all labels carefully. Look for the word  acetaminophen  or  Tylenol.  Ask your pharmacist if you have questions or are unsure.    You can get addicted to pain medicines, especially if you have a history of addiction (chemical, alcohol or substance dependence). Talk to your care team about ways to reduce this risk.    All opioids tend to cause constipation. Drink plenty of water and eat foods that have a lot of fiber, such as fruits, vegetables, prune juice, apple juice and high-fiber cereal. Take a laxative (Miralax, milk of magnesia, Colace, Senna) if you don t move your bowels at least every other day. Other side effects include upset stomach, sleepiness, dizziness, throwing up, tolerance (needing more of the medicine to have the same effect), physical dependence and slowed breathing.    Store your pills in a secure place, locked if possible. We will not replace any lost or stolen medicine. If you don t finish your medicine, please throw away (dispose) as directed by your pharmacist. The [place] [employer] has more information about safe disposal: https://www.pca.[facility].[place].us/living-green/managing-unwanted-medications             Medication List: This is a list of all your medications and when to take them. Check marks below indicate your daily home schedule. Keep this list as a  reference.      Medications           Morning Afternoon Evening Bedtime As Needed    * albuterol (5 MG/ML) 0.5% neb solution   Commonly known as:  PROVENTIL   Take 2.5 mg by nebulization every 6 hours as needed for wheezing or shortness of breath / dyspnea                                * albuterol (2.5 MG/3ML) 0.083% neb solution   Inhale 2.5 mg into the lungs as needed                                * albuterol 108 (90 Base) MCG/ACT inhaler   Commonly known as:  PROAIR HFA/PROVENTIL HFA/VENTOLIN HFA   Inhale 2 puffs into the lungs as needed                                ARTHROTEC 75 75-0.2 MG per EC tablet   Take 1 tablet by mouth 2 times daily   Generic drug:  diclofenac-misoprostol                                atorvastatin 10 MG tablet   Commonly known as:  LIPITOR   Take 10 mg by mouth daily                                bisacodyl 5 MG EC tablet   Commonly known as:  DULCOLAX   Take 5 mg by mouth daily as needed for constipation                                budesonide 32 MCG/ACT spray   Commonly known as:  RINOCORT AQUA   Spray 2 sprays into both nostrils as needed                                calcium carbonate 500 mg-vitamin D 200 units 500-200 MG-UNIT per tablet   Commonly known as:  OSCAL with D;OYSTER SHELL CALCIUM   Take 1 tablet by mouth daily                                * CELEXA PO   Take 40 mg by mouth daily                                * citalopram 40 MG tablet   Commonly known as:  celeXA   Take 40 mg by mouth daily                                cetirizine 10 MG tablet   Commonly known as:  zyrTEC   Take 10 mg by mouth daily                                chlorhexidine 0.12 % solution   Commonly known as:  PERIDEX   Take 15 mLs by mouth 2 times daily                                * cholecalciferol 34230 units capsule   Commonly known as:  vitamin D3   Take 1 capsule by mouth daily.                                * vitamin D 2000 units tablet   Take 2,000 Units by mouth daily                                 DAILY AMINO 6000 PO   Take 625 mg by mouth daily                                diazepam 2 MG tablet   Commonly known as:  VALIUM   Take 1 tablet by mouth daily as needed For vertigo                                ELIDEL 1 % cream   Apply  topically 2 times daily.   Generic drug:  pimecrolimus                                * EPINEPHrine 0.3 MG/0.3ML injection 2-pack   Commonly known as:  EPIPEN/ADRENACLICK/or ANY BX GENERIC EQUIV   Inject 0.3 mg into the muscle as needed                                * EPIPEN 0.3 MG/0.3ML injection   Inject 0.3 mg into the muscle once as needed. For anaphylaxis   Generic drug:  EPINEPHrine                                * ferrous sulfate 325 (65 Fe) MG tablet   Commonly known as:  IRON   Take 325 mg by mouth daily (with breakfast)                                * ferrous sulfate 325 (65 Fe) MG tablet   Commonly known as:  IRON   Take 325 mg by mouth daily                                FIBERCON 625 MG tablet   Take 1 tablet by mouth 2 times daily as needed.   Generic drug:  calcium polycarbophil                                flaxseed oil 1000 MG Caps   Take 2,000 mg by mouth daily                                GABAPENTIN PO   Take 100 mg by mouth At Bedtime                                * hydrochlorothiazide 25 MG tablet   Commonly known as:  HYDRODIURIL   Take 25 mg by mouth daily.                                * hydrochlorothiazide 25 MG tablet   Commonly known as:  HYDRODIURIL   Take 25 mg by mouth daily                                HYDROcodone-acetaminophen 5-325 MG per tablet   Commonly known as:  NORCO   Take 2 tablets by mouth every 4 hours as needed for moderate to severe pain                                ipratropium - albuterol 0.5 mg/2.5 mg/3 mL 0.5-2.5 (3) MG/3ML neb solution   Commonly known as:  DUONEB   Take 1 vial (3 mLs) by nebulization every 4 hours as needed for shortness of breath / dyspnea or wheezing                                *  LAC-HYDRIN 12 % lotion   Apply  topically 2 times daily.   Generic drug:  ammonium lactate                                * ammonium lactate 12 % lotion   Commonly known as:  LAC-HYDRIN   Apply 1 inch topically as needed                                levothyroxine 112 MCG tablet   Commonly known as:  SYNTHROID/LEVOTHROID   Take 112 mcg by mouth daily                                montelukast 10 MG tablet   Commonly known as:  SINGULAIR   Take 10 mg by mouth At Bedtime                                MOVANTIK 12.5 MG Tabs tablet   Take 12.5 mg by mouth every morning (before breakfast)   Generic drug:  naloxegol                                multivitamin Tabs tablet   Take 1 tablet by mouth daily                                nystatin-triamcinolone cream   Commonly known as:  MYCOLOG II   Apply topically 2 times daily                                olopatadine 0.6 % nasal spray   Commonly known as:  PATANASE   Spray 2 sprays into both nostrils 2 times daily                                oxyCODONE-acetaminophen  MG per tablet   Commonly known as:  PERCOCET   Take 1 tablet by mouth every 6 hours as needed for severe pain                                * PATANOL 0.1 % ophthalmic solution   Place 1 drop into both eyes 2 times daily.   Generic drug:  olopatadine                                * olopatadine 0.1 % ophthalmic solution   Commonly known as:  PATANOL   Place 1 drop into both eyes daily                                polyethylene glycol powder   Commonly known as:  MIRALAX/GLYCOLAX   Take 1 capful by mouth daily                                * potassium chloride SA 20 MEQ CR tablet   Commonly known as:  K-DUR/KLOR-CON M   Take 40 mEq by mouth daily                                * potassium chloride SA 20 MEQ CR tablet   Commonly known as:  K-DUR/KLOR-CON M   Take 2 tablets by mouth 2 times daily. With food                                PREDNISONE PO   Take by mouth daily Doesn't know mg                                 senna-docusate 8.6-50 MG per tablet   Commonly known as:  SENOKOT-S;PERICOLACE   Take 2 tablets by mouth daily                                tolterodine 4 MG 24 hr capsule   Commonly known as:  DETROL LA   Take by mouth daily                                traZODone 50 MG tablet   Commonly known as:  DESYREL   Take 50 mg by mouth daily                                triamcinolone 0.1 % cream   Commonly known as:  KENALOG   Apply topically 2 times daily                                triamcinolone 55 MCG/ACT inhaler   Commonly known as:  NASACORT   Spray 2 sprays into both nostrils daily                                ZANAFLEX PO   Take 4 mg by mouth every 8 hours as needed for muscle spasms                                * Notice:  This list has 19 medication(s) that are the same as other medications prescribed for you. Read the directions carefully, and ask your doctor or other care provider to review them with you.

## 2018-10-16 NOTE — ANESTHESIA POSTPROCEDURE EVALUATION
Patient: Jimena Mensah    Procedure(s):  CATARACT EXTRACTION LEFT EYE WITH IMPLANT - Wound Class: I-Clean    Diagnosis:CATARACT LEFT EYE  Diagnosis Additional Information: No value filed.    Anesthesia Type:  MAC    Note:  Anesthesia Post Evaluation    Patient location during evaluation: PACU  Patient participation: Able to fully participate in evaluation  Level of consciousness: awake and alert  Pain management: adequate  Airway patency: patent  Cardiovascular status: acceptable  Respiratory status: acceptable  Hydration status: acceptable  PONV: none             Last vitals:  Vitals:    10/16/18 0807 10/16/18 0915   BP: 145/78 147/83   Resp: 16 16   SpO2: 94% 93%         Electronically Signed By: Jesus Loaiza MD  October 16, 2018  9:45 AM

## 2018-10-16 NOTE — ANESTHESIA CARE TRANSFER NOTE
Patient: Jimena Mensah    Procedure(s):  CATARACT EXTRACTION LEFT EYE WITH IMPLANT - Wound Class: I-Clean    Diagnosis: CATARACT LEFT EYE  Diagnosis Additional Information: No value filed.    Anesthesia Type:   MAC     Note:  Airway :Nasal Cannula  Patient transferred to:Phase II  Handoff Report: Identifed the Patient, Identified the Reponsible Provider, Reviewed the pertinent medical history, Discussed the surgical course, Reviewed Intra-OP anesthesia mangement and issues during anesthesia, Set expectations for post-procedure period and Allowed opportunity for questions and acknowledgement of understanding      Vitals: (Last set prior to Anesthesia Care Transfer)    CRNA VITALS  10/16/2018 0842 - 10/16/2018 0913      10/16/2018             Resp Rate (observed): (!)  1    Resp Rate (set): 8                Electronically Signed By: UBALDO Dorsey CRNA  October 16, 2018  9:13 AM

## 2018-11-05 ENCOUNTER — ANESTHESIA EVENT (OUTPATIENT)
Dept: SURGERY | Facility: HOSPITAL | Age: 63
End: 2018-11-05
Payer: MEDICAID

## 2018-11-05 ASSESSMENT — COPD QUESTIONNAIRES
CAT_SEVERITY: MILD
COPD: 1

## 2018-11-05 ASSESSMENT — LIFESTYLE VARIABLES: TOBACCO_USE: 1

## 2018-11-05 NOTE — ANESTHESIA PREPROCEDURE EVALUATION
Anesthesia Evaluation     . Pt has had prior anesthetic. Type: General    No history of anesthetic complications          ROS/MED HX    ENT/Pulmonary:     (+)tobacco use, Current use mild COPD, , . .    Neurologic:  - neg neurologic ROS     Cardiovascular:     (+) Dyslipidemia, hypertension-range: 135-165 systolic, ---. : . . . :. .       METS/Exercise Tolerance:     Hematologic:  - neg hematologic  ROS       Musculoskeletal:   (+) arthritis, , , -       GI/Hepatic:     (+) liver disease, Other GI/Hepatic ETOH related      Renal/Genitourinary:         Endo:     (+) thyroid problem Obesity, .      Psychiatric:     (+) psychiatric history schizophrenia, bipolar, depression and anxiety      Infectious Disease:  - neg infectious disease ROS       Malignancy:   (+) Malignancy History of Other  Other CA colon status post         Other:    - neg other ROS                 Physical Exam      Airway   Mallampati: II  TM distance: >3 FB  Neck ROM: full    Dental   (+) partials and upper dentures    Cardiovascular   Rhythm and rate: regular and normal      Pulmonary    breath sounds clear to auscultation                    Anesthesia Plan      History & Physical Review  History and physical reviewed and following examination; no interval change.    ASA Status:  3 .    NPO Status:  > 8 hours    Plan for MAC with Intravenous induction. Reason for MAC:  Procedure to face, neck, head or breast and Difficulty with conscious sedation (QS)         Postoperative Care      Consents  Anesthetic plan, risks, benefits and alternatives discussed with:  Patient..                          .

## 2018-11-06 ENCOUNTER — ANESTHESIA (OUTPATIENT)
Dept: SURGERY | Facility: HOSPITAL | Age: 63
End: 2018-11-06
Payer: MEDICAID

## 2018-11-06 ENCOUNTER — HOSPITAL ENCOUNTER (OUTPATIENT)
Facility: HOSPITAL | Age: 63
Discharge: HOME | End: 2018-11-06
Attending: OPHTHALMOLOGY | Admitting: OPHTHALMOLOGY
Payer: MEDICAID

## 2018-11-06 ENCOUNTER — SURGERY (OUTPATIENT)
Age: 63
End: 2018-11-06

## 2018-11-06 VITALS
TEMPERATURE: 98.1 F | HEIGHT: 68 IN | DIASTOLIC BLOOD PRESSURE: 84 MMHG | SYSTOLIC BLOOD PRESSURE: 164 MMHG | WEIGHT: 216 LBS | OXYGEN SATURATION: 95 % | HEART RATE: 64 BPM | RESPIRATION RATE: 20 BRPM | BODY MASS INDEX: 32.74 KG/M2

## 2018-11-06 PROCEDURE — 25000128 H RX IP 250 OP 636: Performed by: OPHTHALMOLOGY

## 2018-11-06 PROCEDURE — 27210794 ZZH OR GENERAL SUPPLY STERILE: Performed by: OPHTHALMOLOGY

## 2018-11-06 PROCEDURE — 40000305 ZZH STATISTIC PRE PROC ASSESS I: Performed by: OPHTHALMOLOGY

## 2018-11-06 PROCEDURE — 25000125 ZZHC RX 250: Performed by: OPHTHALMOLOGY

## 2018-11-06 PROCEDURE — C9447 INJ, PHENYLEPHRINE KETOROLAC: HCPCS | Performed by: OPHTHALMOLOGY

## 2018-11-06 PROCEDURE — 36000056 ZZH SURGERY LEVEL 3 1ST 30 MIN: Performed by: OPHTHALMOLOGY

## 2018-11-06 PROCEDURE — 66984 XCAPSL CTRC RMVL W/O ECP: CPT | Performed by: ANESTHESIOLOGY

## 2018-11-06 PROCEDURE — 66984 XCAPSL CTRC RMVL W/O ECP: CPT | Performed by: NURSE ANESTHETIST, CERTIFIED REGISTERED

## 2018-11-06 PROCEDURE — 71000027 ZZH RECOVERY PHASE 2 EACH 15 MINS: Performed by: OPHTHALMOLOGY

## 2018-11-06 PROCEDURE — 37000008 ZZH ANESTHESIA TECHNICAL FEE, 1ST 30 MIN: Performed by: OPHTHALMOLOGY

## 2018-11-06 PROCEDURE — 25000128 H RX IP 250 OP 636: Performed by: NURSE ANESTHETIST, CERTIFIED REGISTERED

## 2018-11-06 PROCEDURE — V2632 POST CHMBR INTRAOCULAR LENS: HCPCS | Performed by: OPHTHALMOLOGY

## 2018-11-06 RX ORDER — ONDANSETRON 2 MG/ML
INJECTION INTRAMUSCULAR; INTRAVENOUS PRN
Status: DISCONTINUED | OUTPATIENT
Start: 2018-11-06 | End: 2018-11-06

## 2018-11-06 RX ORDER — MOXIFLOXACIN 5 MG/ML
SOLUTION/ DROPS OPHTHALMIC PRN
Status: DISCONTINUED | OUTPATIENT
Start: 2018-11-06 | End: 2018-11-06 | Stop reason: HOSPADM

## 2018-11-06 RX ORDER — PREDNISOLONE ACETATE 10 MG/ML
SUSPENSION/ DROPS OPHTHALMIC PRN
Status: DISCONTINUED | OUTPATIENT
Start: 2018-11-06 | End: 2018-11-06 | Stop reason: HOSPADM

## 2018-11-06 RX ORDER — TETRACAINE HYDROCHLORIDE 5 MG/ML
SOLUTION OPHTHALMIC PRN
Status: DISCONTINUED | OUTPATIENT
Start: 2018-11-06 | End: 2018-11-06 | Stop reason: HOSPADM

## 2018-11-06 RX ORDER — CYCLOPENTOLATE HYDROCHLORIDE 10 MG/ML
1 SOLUTION/ DROPS OPHTHALMIC
Status: COMPLETED | OUTPATIENT
Start: 2018-11-06 | End: 2018-11-06

## 2018-11-06 RX ORDER — SODIUM CHLORIDE, SODIUM LACTATE, POTASSIUM CHLORIDE, CALCIUM CHLORIDE 600; 310; 30; 20 MG/100ML; MG/100ML; MG/100ML; MG/100ML
INJECTION, SOLUTION INTRAVENOUS CONTINUOUS
Status: DISCONTINUED | OUTPATIENT
Start: 2018-11-06 | End: 2018-11-07 | Stop reason: HOSPADM

## 2018-11-06 RX ORDER — FLURBIPROFEN SODIUM 0.3 MG/ML
1 SOLUTION/ DROPS OPHTHALMIC
Status: COMPLETED | OUTPATIENT
Start: 2018-11-06 | End: 2018-11-06

## 2018-11-06 RX ORDER — DICLOFENAC SODIUM 1 MG/ML
1 SOLUTION/ DROPS OPHTHALMIC
Status: DISCONTINUED | OUTPATIENT
Start: 2018-11-06 | End: 2018-11-06 | Stop reason: CLARIF

## 2018-11-06 RX ORDER — NALOXONE HYDROCHLORIDE 0.4 MG/ML
.1-.4 INJECTION, SOLUTION INTRAMUSCULAR; INTRAVENOUS; SUBCUTANEOUS
Status: ACTIVE | OUTPATIENT
Start: 2018-11-06 | End: 2018-11-07

## 2018-11-06 RX ORDER — PHENYLEPHRINE HYDROCHLORIDE 25 MG/ML
1 SOLUTION/ DROPS OPHTHALMIC
Status: COMPLETED | OUTPATIENT
Start: 2018-11-06 | End: 2018-11-06

## 2018-11-06 RX ORDER — ONDANSETRON 4 MG/1
4 TABLET, ORALLY DISINTEGRATING ORAL EVERY 30 MIN PRN
Status: DISCONTINUED | OUTPATIENT
Start: 2018-11-06 | End: 2018-11-07 | Stop reason: HOSPADM

## 2018-11-06 RX ORDER — PROPARACAINE HYDROCHLORIDE 5 MG/ML
1 SOLUTION/ DROPS OPHTHALMIC ONCE
Status: COMPLETED | OUTPATIENT
Start: 2018-11-06 | End: 2018-11-06

## 2018-11-06 RX ORDER — MEPERIDINE HYDROCHLORIDE 50 MG/ML
12.5 INJECTION INTRAMUSCULAR; INTRAVENOUS; SUBCUTANEOUS
Status: DISCONTINUED | OUTPATIENT
Start: 2018-11-06 | End: 2018-11-07 | Stop reason: HOSPADM

## 2018-11-06 RX ORDER — ONDANSETRON 2 MG/ML
4 INJECTION INTRAMUSCULAR; INTRAVENOUS EVERY 30 MIN PRN
Status: DISCONTINUED | OUTPATIENT
Start: 2018-11-06 | End: 2018-11-07 | Stop reason: HOSPADM

## 2018-11-06 RX ORDER — MOXIFLOXACIN 5 MG/ML
1 SOLUTION/ DROPS OPHTHALMIC
Status: COMPLETED | OUTPATIENT
Start: 2018-11-06 | End: 2018-11-06

## 2018-11-06 RX ADMIN — PREDNISOLONE ACETATE 1 DROP: 10 SUSPENSION/ DROPS OPHTHALMIC at 09:43

## 2018-11-06 RX ADMIN — TETRACAINE HYDROCHLORIDE 2 DROP: 5 SOLUTION OPHTHALMIC at 09:43

## 2018-11-06 RX ADMIN — CYCLOPENTOLATE HYDROCHLORIDE 1 DROP: 10 SOLUTION/ DROPS OPHTHALMIC at 09:18

## 2018-11-06 RX ADMIN — CYCLOPENTOLATE HYDROCHLORIDE 1 DROP: 10 SOLUTION/ DROPS OPHTHALMIC at 09:04

## 2018-11-06 RX ADMIN — MOXIFLOXACIN HYDROCHLORIDE 1 DROP: 5 SOLUTION/ DROPS OPHTHALMIC at 09:03

## 2018-11-06 RX ADMIN — MIDAZOLAM 1 MG: 1 INJECTION INTRAMUSCULAR; INTRAVENOUS at 09:34

## 2018-11-06 RX ADMIN — MOXIFLOXACIN HYDROCHLORIDE 1 DROP: 5 SOLUTION/ DROPS OPHTHALMIC at 09:17

## 2018-11-06 RX ADMIN — FLURBIPROFEN SODIUM 1 DROP: 0.3 SOLUTION/ DROPS OPHTHALMIC at 09:12

## 2018-11-06 RX ADMIN — PHENYLEPHRINE HYDROCHLORIDE 1 DROP: 25 SOLUTION/ DROPS OPHTHALMIC at 09:04

## 2018-11-06 RX ADMIN — FLURBIPROFEN SODIUM 1 DROP: 0.3 SOLUTION/ DROPS OPHTHALMIC at 09:03

## 2018-11-06 RX ADMIN — PHENYLEPHRINE AND KETOROLAC 300 ML: 10.16; 2.88 INJECTION, SOLUTION, CONCENTRATE INTRAOCULAR at 09:43

## 2018-11-06 RX ADMIN — PHENYLEPHRINE HYDROCHLORIDE 2 ML: 25 SOLUTION/ DROPS OPHTHALMIC at 09:42

## 2018-11-06 RX ADMIN — FLURBIPROFEN SODIUM 1 DROP: 0.3 SOLUTION/ DROPS OPHTHALMIC at 09:18

## 2018-11-06 RX ADMIN — CYCLOPENTOLATE HYDROCHLORIDE 1 DROP: 10 SOLUTION/ DROPS OPHTHALMIC at 09:13

## 2018-11-06 RX ADMIN — PROPARACAINE HYDROCHLORIDE 1 DROP: 5 SOLUTION/ DROPS OPHTHALMIC at 09:07

## 2018-11-06 RX ADMIN — MOXIFLOXACIN HYDROCHLORIDE 1 DROP: 5 SOLUTION/ DROPS OPHTHALMIC at 09:43

## 2018-11-06 RX ADMIN — Medication 0.5 ML: at 09:42

## 2018-11-06 RX ADMIN — PHENYLEPHRINE HYDROCHLORIDE 1 DROP: 25 SOLUTION/ DROPS OPHTHALMIC at 09:18

## 2018-11-06 RX ADMIN — PHENYLEPHRINE HYDROCHLORIDE 1 DROP: 25 SOLUTION/ DROPS OPHTHALMIC at 09:13

## 2018-11-06 RX ADMIN — ONDANSETRON 4 MG: 2 INJECTION INTRAMUSCULAR; INTRAVENOUS at 09:34

## 2018-11-06 RX ADMIN — Medication 0.8 ML: at 09:43

## 2018-11-06 NOTE — ANESTHESIA CARE TRANSFER NOTE
Patient: Jimena Mensah    Procedure(s):  RIGHT CATARACT EXTRACTION WITH IMPLANT    Diagnosis: CATARACT RIGHT EYE  Diagnosis Additional Information: No value filed.    Anesthesia Type:   MAC     Note:  Airway :Room Air  Patient transferred to:Phase II  Handoff Report: Identifed the Patient, Identified the Reponsible Provider, Reviewed the pertinent medical history, Discussed the surgical course, Reviewed Intra-OP anesthesia mangement and issues during anesthesia, Set expectations for post-procedure period and Allowed opportunity for questions and acknowledgement of understanding      Vitals: (Last set prior to Anesthesia Care Transfer)    CRNA VITALS  11/6/2018 0924 - 11/6/2018 0959      11/6/2018             Pulse: 63    Ht Rate: 61    SpO2: 100 %    Resp Rate (set): 8                Electronically Signed By: UBALDO Dowling CRNA  November 6, 2018  9:59 AM

## 2018-11-06 NOTE — DISCHARGE INSTRUCTIONS
Post-Anesthesia Patient Instructions    IMMEDIATELY FOLLOWING SURGERY:  Do not drive or operate machinery for the first twenty four hours after surgery.  Do not make any important decisions for twenty four hours after surgery or while taking narcotic pain medications or sedatives.  If you develop intractable nausea and vomiting or a severe headache please notify your doctor immediately.    FOLLOW-UP:  Please make an appointment with your surgeon as instructed. You do not need to follow up with anesthesia unless specifically instructed to do so.    WOUND CARE INSTRUCTIONS (if applicable):  Keep a dry clean dressing on the anesthesia/puncture wound site if there is drainage.  Once the wound has quit draining you may leave it open to air.  Generally you should leave the bandage intact for twenty four hours unless there is drainage.  If the epidural site drains for more than 36-48 hours please call the anesthesia department.    QUESTIONS?:  Please feel free to call your physician or the hospital  if you have any questions, and they will be happy to assist you.   AFTER CATARACT SURGERY RESTRICTIONS    SHIELD: WEAR OVER SURGICAL EYE AT NIGHT FOR 1 WEEK    ACTIVITY/LIFTING: Avoid activities, which increase abdominal/head pressure such as pulling on a starter cord, heavy lifting (over 15-20 lbs) or bending with the head below the waist, for 1 week. Avoid sudden jerky movements (chopping, shoveling) for 1 week. Waking and lower body exercise such as biking is okay.     WATER: Showering/washing hair is okay the day after surgery, but avoid excess water, soap, or shampoo in your eyes. Clean eyelids gently with a clean, wet washcloth as needed. Avoid pressure on the eye.     SUNLIGHT: If the eye is light sensitive after surgery, dark glasses may be worn.     DIET/MEDICATIONS: Resume you usual diet and medications your family doctor ehas prescribed. Continue to use/follow the instructions for your eye drops.      DRIVING: Avoid driving with a patch. Resume driving when you feel safe, but don't drive on the day of surgery.    PAIN: Minor pain and itching is normal during healing. DO NOT RUB THE EYE! Report any unusual swelling, increased discharge or pain that is not relieved by Tylenol (or equivalent). If sudden drop/change in vision call immediately. [facility] [phone]    CONTINUE TO USE ALL THE EYE DROPS PER THE INSTRUCTIONS ORDERED BY DR. Pham's  TECHNICIANS    FOR EMERGENCY  [phone]    FOLLOW EYE DROP INSTRUCTIONS GIVEN BY 's OFFICE

## 2018-11-06 NOTE — ANESTHESIA POSTPROCEDURE EVALUATION
Patient: Jimena Mensah    Procedure(s):  RIGHT CATARACT EXTRACTION WITH IMPLANT    Diagnosis:CATARACT RIGHT EYE  Diagnosis Additional Information: No value filed.    Anesthesia Type:  MAC    Note:  Anesthesia Post Evaluation    Patient location during evaluation: PACU  Patient participation: Able to fully participate in evaluation  Level of consciousness: awake and alert  Pain management: adequate  Airway patency: patent  Cardiovascular status: acceptable  Respiratory status: acceptable  Hydration status: acceptable  PONV: none             Last vitals:  Vitals:    11/06/18 1005 11/06/18 1010 11/06/18 1015   BP: 149/94 144/82 160/90   Pulse:      Resp: 20 20 20   Temp:      SpO2: (!) 89% (!) 89% 96%         Electronically Signed By: Jesus Loaiza MD  November 6, 2018  10:20 AM

## 2018-11-06 NOTE — IP AVS SNAPSHOT
MRN:[id number]                      After Visit Summary   11/6/2018    Jimena Mensah    MRN: [id number]           Thank you!     Thank you for choosing [facility] for your care. Our goal is always to provide you with excellent care. Hearing back from our patients is one way we can continue to improve our services. Please take a few minutes to complete the written survey that you may receive in the mail after you visit with us. Thank you!        Patient Information     Date Of Birth          1955        About your hospital stay     You were admitted on:  November 6, 2018 You last received care in the:  [facility] Preop/Phase II    You were discharged on:  November 6, 2018       Who to Call     For medical emergencies, please call 911.  For non-urgent questions about your medical care, please call your primary care provider or clinic, [phone]  For questions related to your surgery, please call your surgery clinic        Attending Provider     Provider Specialty    Jamarcus Pham MD Ophthalmology       Primary Care Provider Office Phone # Fax #    Chantell Cook [phone]      Further instructions from your care team           Post-Anesthesia Patient Instructions    IMMEDIATELY FOLLOWING SURGERY:  Do not drive or operate machinery for the first twenty four hours after surgery.  Do not make any important decisions for twenty four hours after surgery or while taking narcotic pain medications or sedatives.  If you develop intractable nausea and vomiting or a severe headache please notify your doctor immediately.    FOLLOW-UP:  Please make an appointment with your surgeon as instructed. You do not need to follow up with anesthesia unless specifically instructed to do so.    WOUND CARE INSTRUCTIONS (if applicable):  Keep a dry clean dressing on the anesthesia/puncture wound site if there is drainage.  Once the wound has quit draining you may leave it open to air.   Generally you should leave the bandage intact for twenty four hours unless there is drainage.  If the epidural site drains for more than 36-48 hours please call the anesthesia department.    QUESTIONS?:  Please feel free to call your physician or the hospital  if you have any questions, and they will be happy to assist you.   AFTER CATARACT SURGERY RESTRICTIONS    SHIELD: WEAR OVER SURGICAL EYE AT NIGHT FOR 1 WEEK    ACTIVITY/LIFTING: Avoid activities, which increase abdominal/head pressure such as pulling on a starter cord, heavy lifting (over 15-20 lbs) or bending with the head below the waist, for 1 week. Avoid sudden jerky movements (chopping, shoveling) for 1 week. Waking and lower body exercise such as biking is okay.     WATER: Showering/washing hair is okay the day after surgery, but avoid excess water, soap, or shampoo in your eyes. Clean eyelids gently with a clean, wet washcloth as needed. Avoid pressure on the eye.     SUNLIGHT: If the eye is light sensitive after surgery, dark glasses may be worn.     DIET/MEDICATIONS: Resume you usual diet and medications your family doctor ehas prescribed. Continue to use/follow the instructions for your eye drops.     DRIVING: Avoid driving with a patch. Resume driving when you feel safe, but don't drive on the day of surgery.    PAIN: Minor pain and itching is normal during healing. DO NOT RUB THE EYE! Report any unusual swelling, increased discharge or pain that is not relieved by Tylenol (or equivalent). If sudden drop/change in vision call immediately. [facility] [phone]    CONTINUE TO USE ALL THE EYE DROPS PER THE INSTRUCTIONS ORDERED BY DR. Pham's  TECHNICIANS    FOR EMERGENCY  [phone]    FOLLOW EYE DROP INSTRUCTIONS GIVEN BY 's OFFICE      Pending Results     No orders found from 11/4/2018 to 11/7/2018.            Admission Information     Date & Time Provider Department Dept. Phone    11/6/2018 Ankit  "Jamarcus FERRELL MD [facility] Preop/Phase [phone]      Your Vitals Were     Blood Pressure Pulse Temperature Respirations Height Weight    139/83 64 98.1  F (36.7  C) (Oral) 20 1.727 m (5' 8\") 98 kg (216 lb)    Pulse Oximetry BMI (Body Mass Index)                90% 32.84 kg/m2          [facility] Information     [facility] lets you send messages to your doctor, view your test results, renew your prescriptions, schedule appointments and more. To sign up, go to [email or web address] . Click on \"Log in\" on the left side of the screen, which will take you to the Welcome page. Then click on \"Sign up Now\" on the right side of the page.     You will be asked to enter the access code listed below, as well as some personal information. Please follow the directions to create your username and password.     Your access code is: [email or web address]  Expires: 2019  1:49 PM     Your access code will  in 90 days. If you need help or a new code, please call your [facility] clinic or [phone].        Care EveryWhere ID     This is your Care EveryWhere ID. This could be used by other organizations to access your [facility] medical records  [id number]        Equal Access to Services     JET PALACIOS AH: Hadjovanni Euceda, waaxda luqadaha, qaybta kaalmada adeeanyamarck, charles colorado . So [facility] [phone].    ATENCIÓN: Si habla español, tiene a jean disposición servicios gratuitos de asistencia lingüística. Llame al [phone].    We comply with applicable federal civil rights laws and [place] laws. We do not discriminate on the basis of race, color, national origin, age, disability, sex, sexual orientation, or gender identity.               Review of your medicines      UNREVIEWED medicines. Ask your doctor about these medicines        Dose / Directions    * albuterol (5 MG/ML) 0.5% neb solution   Commonly known as:  PROVENTIL        Dose:  2.5 mg   Take 2.5 mg by nebulization every 6 hours as needed " for wheezing or shortness of breath / dyspnea   Refills:  0       * albuterol (2.5 MG/3ML) 0.083% neb solution        Dose:  2.5 mg   Inhale 2.5 mg into the lungs as needed   Refills:  0       * albuterol 108 (90 Base) MCG/ACT inhaler   Commonly known as:  PROAIR HFA/PROVENTIL HFA/VENTOLIN HFA        Dose:  2 puff   Inhale 2 puffs into the lungs as needed   Refills:  0       ARTHROTEC 75 75-0.2 MG per EC tablet   Generic drug:  diclofenac-misoprostol        Dose:  1 tablet   Take 1 tablet by mouth 2 times daily   Refills:  0       atorvastatin 10 MG tablet   Commonly known as:  LIPITOR        Dose:  10 mg   Take 10 mg by mouth daily   Refills:  0       bisacodyl 5 MG EC tablet   Commonly known as:  DULCOLAX        Dose:  5 mg   Take 5 mg by mouth daily as needed for constipation   Refills:  0       CELEXA PO        Dose:  40 mg   Take 40 mg by mouth daily   Refills:  0       cetirizine 10 MG tablet   Commonly known as:  zyrTEC        Dose:  10 mg   Take 10 mg by mouth daily   Refills:  0       chlorhexidine 0.12 % solution   Commonly known as:  PERIDEX        Dose:  15 mL   Take 15 mLs by mouth 2 times daily   Refills:  0       diazepam 2 MG tablet   Commonly known as:  VALIUM        Dose:  1 tablet   Take 1 tablet by mouth daily as needed For vertigo   Refills:  0       ELIDEL 1 % cream   Generic drug:  pimecrolimus        Apply  topically 2 times daily.   Refills:  0       EPIPEN 0.3 MG/0.3ML injection   Generic drug:  EPINEPHrine        Dose:  0.3 mg   Inject 0.3 mg into the muscle once as needed. For anaphylaxis   Refills:  0       FIBERCON 625 MG tablet   Generic drug:  calcium polycarbophil        Dose:  1 tablet   Take 1 tablet by mouth 2 times daily as needed.   Refills:  0       GABAPENTIN PO        Dose:  100 mg   Take 100 mg by mouth At Bedtime   Refills:  0       hydrochlorothiazide 25 MG tablet   Commonly known as:  HYDRODIURIL        Dose:  25 mg   Take 25 mg by mouth daily   Refills:  0        ipratropium - albuterol 0.5 mg/2.5 mg/3 mL 0.5-2.5 (3) MG/3ML neb solution   Commonly known as:  DUONEB        Dose:  1 vial   Take 1 vial (3 mLs) by nebulization every 4 hours as needed for shortness of breath / dyspnea or wheezing   Quantity:  360 mL   Refills:  0       levothyroxine 112 MCG tablet   Commonly known as:  SYNTHROID/LEVOTHROID        Dose:  112 mcg   Take 112 mcg by mouth daily   Refills:  0       oxyCODONE-acetaminophen  MG per tablet   Commonly known as:  PERCOCET        Dose:  1 tablet   Take 1 tablet by mouth every 6 hours as needed for severe pain   Refills:  0       PATANOL 0.1 % ophthalmic solution   Generic drug:  olopatadine        Dose:  1 drop   Place 1 drop into both eyes 2 times daily.   Refills:  0       tolterodine 4 MG 24 hr capsule   Commonly known as:  DETROL LA        Take by mouth daily   Refills:  0       traZODone 50 MG tablet   Commonly known as:  DESYREL        Dose:  50 mg   Take 50 mg by mouth daily   Refills:  0       triamcinolone 0.1 % cream   Commonly known as:  KENALOG        Apply topically 2 times daily   Refills:  0       triamcinolone 55 MCG/ACT inhaler   Commonly known as:  NASACORT        Dose:  2 spray   Spray 2 sprays into both nostrils daily   Refills:  0       * Notice:  This list has 3 medication(s) that are the same as other medications prescribed for you. Read the directions carefully, and ask your doctor or other care provider to review them with you.             Protect others around you: Learn how to safely use, store and throw away your medicines at www.disposemymeds.org.             Medication List: This is a list of all your medications and when to take them. Check marks below indicate your daily home schedule. Keep this list as a reference.      Medications           Morning Afternoon Evening Bedtime As Needed    * albuterol (5 MG/ML) 0.5% neb solution   Commonly known as:  PROVENTIL   Take 2.5 mg by nebulization every 6 hours as needed for  wheezing or shortness of breath / dyspnea                                * albuterol (2.5 MG/3ML) 0.083% neb solution   Inhale 2.5 mg into the lungs as needed                                * albuterol 108 (90 Base) MCG/ACT inhaler   Commonly known as:  PROAIR HFA/PROVENTIL HFA/VENTOLIN HFA   Inhale 2 puffs into the lungs as needed                                ARTHROTEC 75 75-0.2 MG per EC tablet   Take 1 tablet by mouth 2 times daily   Generic drug:  diclofenac-misoprostol                                atorvastatin 10 MG tablet   Commonly known as:  LIPITOR   Take 10 mg by mouth daily                                bisacodyl 5 MG EC tablet   Commonly known as:  DULCOLAX   Take 5 mg by mouth daily as needed for constipation                                CELEXA PO   Take 40 mg by mouth daily                                cetirizine 10 MG tablet   Commonly known as:  zyrTEC   Take 10 mg by mouth daily                                chlorhexidine 0.12 % solution   Commonly known as:  PERIDEX   Take 15 mLs by mouth 2 times daily                                diazepam 2 MG tablet   Commonly known as:  VALIUM   Take 1 tablet by mouth daily as needed For vertigo                                ELIDEL 1 % cream   Apply  topically 2 times daily.   Generic drug:  pimecrolimus                                EPIPEN 0.3 MG/0.3ML injection   Inject 0.3 mg into the muscle once as needed. For anaphylaxis   Generic drug:  EPINEPHrine                                FIBERCON 625 MG tablet   Take 1 tablet by mouth 2 times daily as needed.   Generic drug:  calcium polycarbophil                                GABAPENTIN PO   Take 100 mg by mouth At Bedtime                                hydrochlorothiazide 25 MG tablet   Commonly known as:  HYDRODIURIL   Take 25 mg by mouth daily                                ipratropium - albuterol 0.5 mg/2.5 mg/3 mL 0.5-2.5 (3) MG/3ML neb solution   Commonly known as:  DUONEB   Take 1 vial (3  mLs) by nebulization every 4 hours as needed for shortness of breath / dyspnea or wheezing                                levothyroxine 112 MCG tablet   Commonly known as:  SYNTHROID/LEVOTHROID   Take 112 mcg by mouth daily                                oxyCODONE-acetaminophen  MG per tablet   Commonly known as:  PERCOCET   Take 1 tablet by mouth every 6 hours as needed for severe pain                                PATANOL 0.1 % ophthalmic solution   Place 1 drop into both eyes 2 times daily.   Generic drug:  olopatadine                                tolterodine 4 MG 24 hr capsule   Commonly known as:  DETROL LA   Take by mouth daily                                traZODone 50 MG tablet   Commonly known as:  DESYREL   Take 50 mg by mouth daily                                triamcinolone 0.1 % cream   Commonly known as:  KENALOG   Apply topically 2 times daily                                triamcinolone 55 MCG/ACT inhaler   Commonly known as:  NASACORT   Spray 2 sprays into both nostrils daily                                * Notice:  This list has 3 medication(s) that are the same as other medications prescribed for you. Read the directions carefully, and ask your doctor or other care provider to review them with you.

## 2018-11-06 NOTE — OR NURSING
Patient and responsible adult given discharge instructions with no questions regarding instructions. Irinoe score 20. Pain level 0/10.  Discharged from unit via walking. Patient discharged to home.

## 2018-11-06 NOTE — OP NOTE
Ophthalmology Operative Note    DATE OF SERVICE:  11/6/2018.     PREOPERATIVE DIAGNOSIS:     Cataract, Right eye.       POSTOPERATIVE DIAGNOSIS:  Cataract, Right eye.       SURGEON: Jamarcus Pham MD    PROCEDURE:  Phacoemulsification with intraocular lens implant, Model PCB00, 32.00 diopter power.     ANESTHESIA:  Topical with IV sedation. Combined MAC with Topical.       INDICATION: Jimena Mensah is a 63 year old female with a history of a visually significant cataract of the Right eye that is interfering with activities of daily living.      DESCRIPTION OF PROCEDURE:   Operative risks, benefits and alternatives to the procedure were discussed at length with Jimena Mensah including infection, bleeding, retinal detachment, loss of vision and loss of the eye.  Patient voiced understanding and informed consent was signed.  The patient was taken to the operating suite, where an appropriate level of anesthesia was given.  Attention was placed to the operative eye.  The patient was then prepped and draped in the usual sterile ophthalmic manner with 5% povidone iodine solution.  A lid speculum was placed in the eye to provide exposure and MVR blade was used to make a paracentesis.  Once this was performed, Shugarcaine was then placed intracamerally.  This was then followed by intracameral Viscoat.  A 2.4 mm blade was then used to make a biplanar temporal clear corneal incision.  A cystotome and uttrata were used to make a continuous curvilinear capsulorrhexis. Balanced salt solution on flat cannula was then used to hydrodissect the lens.  Phacoemulsification was then performed in a divide-and-conquer technique to remove all pieces of the nucleus.  Irrigation aspiration was then used to remove all remaining cortical material and debris.  Amvisc was then used to fill the capsular bag.  A PCB00 32.00 diopter lens was then injected into the capsular bag using the injector for a target refraction of -0.38 via  William Q.  Once this was performed, a secondary instrument was used to rotate the lens into proper position.  Irrigation aspiration was then used to remove all remaining viscoelastic material and center the lens appropriately.  Balanced salt solution on 30 gauge cannula was then used to hydrate both wounds.  A Weck-Dara was used to assess intraocular pressure and wound leakage.  Once this was stable and the lens was found to be in good position, the patient was undraped.  The patient was brought to the recovery area in stable condition.  Family was made aware of the patient's condition and the patient will follow up with us later this afternoon.  No complications.

## 2018-11-06 NOTE — IP AVS SNAPSHOT
[facility]    [address] [address]    Phone:  [phone]                                       After Visit Summary   11/6/2018    Jimena Mensah    MRN: [id number]           After Visit Summary Signature Page     I have received my discharge instructions, and my questions have been answered. I have discussed any challenges I see with this plan with the nurse or doctor.    ..........................................................................................................................................  Patient/Patient Representative Signature      ..........................................................................................................................................  Patient Representative Print Name and Relationship to Patient    ..................................................               ................................................  Date                                   Time    ..........................................................................................................................................  Reviewed by Signature/Title    ...................................................              ..............................................  Date                                               Time          22EPIC Rev 08/18

## 2019-01-18 ENCOUNTER — ALLIED HEALTH/NURSE VISIT (OUTPATIENT)
Dept: AUDIOLOGY | Facility: OTHER | Age: 64
End: 2019-01-18
Attending: AUDIOLOGIST
Payer: MEDICAID

## 2019-01-18 DIAGNOSIS — H90.3 SENSORINEURAL HEARING LOSS, BILATERAL: ICD-10-CM

## 2019-01-18 PROCEDURE — V5266 BATTERY FOR HEARING DEVICE: HCPCS

## 2019-03-08 ENCOUNTER — HOSPITAL ENCOUNTER (OUTPATIENT)
Dept: CT IMAGING | Facility: HOSPITAL | Age: 64
End: 2019-03-08
Attending: NURSE PRACTITIONER
Payer: MEDICAID

## 2019-03-08 ENCOUNTER — HOSPITAL ENCOUNTER (OUTPATIENT)
Dept: MRI IMAGING | Facility: HOSPITAL | Age: 64
Discharge: HOME | End: 2019-03-08
Attending: NURSE PRACTITIONER | Admitting: NURSE PRACTITIONER
Payer: MEDICAID

## 2019-03-08 DIAGNOSIS — M54.42 ACUTE BACK PAIN WITH SCIATICA, LEFT: ICD-10-CM

## 2019-03-08 DIAGNOSIS — M54.2 CERVICALGIA: ICD-10-CM

## 2019-03-08 DIAGNOSIS — M79.2 NERVE PAIN: ICD-10-CM

## 2019-03-08 PROCEDURE — 72125 CT NECK SPINE W/O DYE: CPT | Mod: TC

## 2019-03-08 PROCEDURE — 72148 MRI LUMBAR SPINE W/O DYE: CPT | Mod: TC

## 2019-05-03 ENCOUNTER — ALLIED HEALTH/NURSE VISIT (OUTPATIENT)
Dept: AUDIOLOGY | Facility: OTHER | Age: 64
End: 2019-05-03
Attending: AUDIOLOGIST
Payer: MEDICAID

## 2019-05-03 DIAGNOSIS — H90.3 SENSORINEURAL HEARING LOSS, BILATERAL: ICD-10-CM

## 2019-05-03 PROCEDURE — V5266 BATTERY FOR HEARING DEVICE: HCPCS

## 2019-08-05 ENCOUNTER — ALLIED HEALTH/NURSE VISIT (OUTPATIENT)
Dept: AUDIOLOGY | Facility: OTHER | Age: 64
End: 2019-08-05
Attending: AUDIOLOGIST
Payer: MEDICAID

## 2019-08-05 DIAGNOSIS — H90.3 SENSORINEURAL HEARING LOSS, BILATERAL: Primary | ICD-10-CM

## 2019-08-05 PROCEDURE — V5266 BATTERY FOR HEARING DEVICE: HCPCS

## 2019-08-05 NOTE — PROGRESS NOTES
Per guidelines of patient's insurance, 24 units of size 312 batteries were dispensed today, Battery Modifier: NU (New). Reviewed that patent is eligible for batteries once every 90 days, and how they can request new batteries.    Alisa Dong  Audiology Assistant  [facility]  [phone]

## 2019-12-16 ENCOUNTER — HOSPITAL ENCOUNTER (OUTPATIENT)
Dept: MRI IMAGING | Facility: HOSPITAL | Age: 64
Discharge: HOME | End: 2019-12-16
Attending: NURSE PRACTITIONER | Admitting: NURSE PRACTITIONER
Payer: MEDICARE

## 2019-12-16 ENCOUNTER — HOSPITAL ENCOUNTER (OUTPATIENT)
Dept: MRI IMAGING | Facility: HOSPITAL | Age: 64
End: 2019-12-16
Attending: NURSE PRACTITIONER
Payer: MEDICARE

## 2019-12-16 DIAGNOSIS — M25.562 LEFT KNEE PAIN: ICD-10-CM

## 2019-12-16 DIAGNOSIS — G89.29 OTHER CHRONIC PAIN: ICD-10-CM

## 2019-12-16 DIAGNOSIS — M23.52 CHRONIC INSTABILITY OF KNEE, LEFT KNEE: ICD-10-CM

## 2019-12-16 DIAGNOSIS — M23.51 CHRONIC INSTABILITY OF KNEE, RIGHT KNEE: ICD-10-CM

## 2019-12-16 DIAGNOSIS — M25.561 PAIN IN RIGHT KNEE: ICD-10-CM

## 2019-12-16 PROCEDURE — 73721 MRI JNT OF LWR EXTRE W/O DYE: CPT | Mod: TC,LT

## 2020-01-01 ENCOUNTER — ALLIED HEALTH/NURSE VISIT (OUTPATIENT)
Dept: AUDIOLOGY | Facility: OTHER | Age: 65
End: 2020-01-01
Attending: AUDIOLOGIST
Payer: COMMERCIAL

## 2020-01-01 ENCOUNTER — APPOINTMENT (OUTPATIENT)
Dept: CT IMAGING | Facility: HOSPITAL | Age: 65
End: 2020-01-01
Attending: EMERGENCY MEDICINE
Payer: COMMERCIAL

## 2020-01-01 ENCOUNTER — APPOINTMENT (OUTPATIENT)
Dept: GENERAL RADIOLOGY | Facility: HOSPITAL | Age: 65
End: 2020-01-01
Attending: EMERGENCY MEDICINE
Payer: COMMERCIAL

## 2020-01-01 ENCOUNTER — HOSPITAL ENCOUNTER (OUTPATIENT)
Dept: MRI IMAGING | Facility: HOSPITAL | Age: 65
End: 2020-11-13
Attending: ORTHOPAEDIC SURGERY
Payer: COMMERCIAL

## 2020-01-01 ENCOUNTER — HOSPITAL ENCOUNTER (EMERGENCY)
Facility: HOSPITAL | Age: 65
Discharge: HOME | End: 2020-05-03
Attending: INTERNAL MEDICINE | Admitting: INTERNAL MEDICINE
Payer: COMMERCIAL

## 2020-01-01 ENCOUNTER — MEDICAL CORRESPONDENCE (OUTPATIENT)
Dept: MRI IMAGING | Facility: HOSPITAL | Age: 65
End: 2020-01-01

## 2020-01-01 ENCOUNTER — HOSPITAL ENCOUNTER (EMERGENCY)
Facility: HOSPITAL | Age: 65
Discharge: HOME | End: 2020-05-16
Attending: EMERGENCY MEDICINE | Admitting: EMERGENCY MEDICINE
Payer: COMMERCIAL

## 2020-01-01 VITALS
TEMPERATURE: 98.6 F | HEART RATE: 97 BPM | SYSTOLIC BLOOD PRESSURE: 152 MMHG | RESPIRATION RATE: 16 BRPM | OXYGEN SATURATION: 95 % | DIASTOLIC BLOOD PRESSURE: 92 MMHG

## 2020-01-01 VITALS
SYSTOLIC BLOOD PRESSURE: 130 MMHG | OXYGEN SATURATION: 100 % | HEART RATE: 92 BPM | RESPIRATION RATE: 20 BRPM | DIASTOLIC BLOOD PRESSURE: 60 MMHG | TEMPERATURE: 98.3 F

## 2020-01-01 DIAGNOSIS — F10.920 ALCOHOLIC INTOXICATION WITHOUT COMPLICATION (H): ICD-10-CM

## 2020-01-01 DIAGNOSIS — H90.3 SENSORINEURAL HEARING LOSS, BILATERAL: Primary | ICD-10-CM

## 2020-01-01 DIAGNOSIS — F10.929 ALCOHOL INTOXICATION, WITH UNSPECIFIED COMPLICATION (H): Primary | ICD-10-CM

## 2020-01-01 DIAGNOSIS — S83.411A SPRAIN OF MEDIAL COLLATERAL LIGAMENT OF RIGHT KNEE, INITIAL ENCOUNTER: ICD-10-CM

## 2020-01-01 DIAGNOSIS — M25.562 BILATERAL KNEE PAIN: ICD-10-CM

## 2020-01-01 DIAGNOSIS — Z91.81 PERSONAL HISTORY OF FALL: ICD-10-CM

## 2020-01-01 DIAGNOSIS — M25.561 BILATERAL KNEE PAIN: ICD-10-CM

## 2020-01-01 DIAGNOSIS — G89.29 CHRONIC NECK PAIN: ICD-10-CM

## 2020-01-01 DIAGNOSIS — M54.2 CHRONIC NECK PAIN: ICD-10-CM

## 2020-01-01 DIAGNOSIS — R74.01 TRANSAMINITIS: ICD-10-CM

## 2020-01-01 DIAGNOSIS — I44.7 LEFT BUNDLE BRANCH BLOCK (LBBB) DETERMINED BY ELECTROCARDIOGRAPHY: ICD-10-CM

## 2020-01-01 LAB
ALBUMIN SERPL-MCNC: 3.6 G/DL (ref 3.4–5)
ALBUMIN SERPL-MCNC: 3.7 G/DL (ref 3.4–5)
ALP SERPL-CCNC: 101 U/L (ref 40–150)
ALP SERPL-CCNC: 85 U/L (ref 40–150)
ALT SERPL W P-5'-P-CCNC: 178 U/L (ref 0–50)
ALT SERPL W P-5'-P-CCNC: 47 U/L (ref 0–50)
ANION GAP SERPL CALCULATED.3IONS-SCNC: 10 MMOL/L (ref 3–14)
ANION GAP SERPL CALCULATED.3IONS-SCNC: 6 MMOL/L (ref 3–14)
AST SERPL W P-5'-P-CCNC: 29 U/L (ref 0–45)
AST SERPL W P-5'-P-CCNC: 75 U/L (ref 0–45)
BASOPHILS # BLD AUTO: 0.1 10E9/L (ref 0–0.2)
BASOPHILS # BLD AUTO: 0.1 10E9/L (ref 0–0.2)
BASOPHILS NFR BLD AUTO: 0.9 %
BASOPHILS NFR BLD AUTO: 1.1 %
BILIRUB SERPL-MCNC: 0.2 MG/DL (ref 0.2–1.3)
BILIRUB SERPL-MCNC: 0.2 MG/DL (ref 0.2–1.3)
BUN SERPL-MCNC: 6 MG/DL (ref 7–30)
BUN SERPL-MCNC: 7 MG/DL (ref 7–30)
CALCIUM SERPL-MCNC: 8.3 MG/DL (ref 8.5–10.1)
CALCIUM SERPL-MCNC: 8.8 MG/DL (ref 8.5–10.1)
CHLORIDE SERPL-SCNC: 101 MMOL/L (ref 94–109)
CHLORIDE SERPL-SCNC: 102 MMOL/L (ref 94–109)
CO2 SERPL-SCNC: 23 MMOL/L (ref 20–32)
CO2 SERPL-SCNC: 28 MMOL/L (ref 20–32)
CREAT SERPL-MCNC: 0.57 MG/DL (ref 0.52–1.04)
CREAT SERPL-MCNC: 0.62 MG/DL (ref 0.52–1.04)
DIFFERENTIAL METHOD BLD: NORMAL
DIFFERENTIAL METHOD BLD: NORMAL
EOSINOPHIL # BLD AUTO: 0.2 10E9/L (ref 0–0.7)
EOSINOPHIL # BLD AUTO: 0.3 10E9/L (ref 0–0.7)
EOSINOPHIL NFR BLD AUTO: 2.6 %
EOSINOPHIL NFR BLD AUTO: 3.7 %
ERYTHROCYTE [DISTWIDTH] IN BLOOD BY AUTOMATED COUNT: 14.1 % (ref 10–15)
ERYTHROCYTE [DISTWIDTH] IN BLOOD BY AUTOMATED COUNT: 14.1 % (ref 10–15)
ETHANOL SERPL-MCNC: 0.1 G/DL
ETHANOL SERPL-MCNC: 0.3 G/DL
ETHANOL SERPL-MCNC: 0.31 G/DL
GFR SERPL CREATININE-BSD FRML MDRD: >90 ML/MIN/{1.73_M2}
GFR SERPL CREATININE-BSD FRML MDRD: >90 ML/MIN/{1.73_M2}
GLUCOSE SERPL-MCNC: 83 MG/DL (ref 70–99)
GLUCOSE SERPL-MCNC: 89 MG/DL (ref 70–99)
HCT VFR BLD AUTO: 42.3 % (ref 35–47)
HCT VFR BLD AUTO: 43.8 % (ref 35–47)
HGB BLD-MCNC: 14.3 G/DL (ref 11.7–15.7)
HGB BLD-MCNC: 14.8 G/DL (ref 11.7–15.7)
IMM GRANULOCYTES # BLD: 0 10E9/L (ref 0–0.4)
IMM GRANULOCYTES # BLD: 0 10E9/L (ref 0–0.4)
IMM GRANULOCYTES NFR BLD: 0.4 %
IMM GRANULOCYTES NFR BLD: 0.5 %
LYMPHOCYTES # BLD AUTO: 3.5 10E9/L (ref 0.8–5.3)
LYMPHOCYTES # BLD AUTO: 3.8 10E9/L (ref 0.8–5.3)
LYMPHOCYTES NFR BLD AUTO: 45.4 %
LYMPHOCYTES NFR BLD AUTO: 46.6 %
MAGNESIUM SERPL-MCNC: 2.2 MG/DL (ref 1.6–2.3)
MCH RBC QN AUTO: 30.2 PG (ref 26.5–33)
MCH RBC QN AUTO: 30.8 PG (ref 26.5–33)
MCHC RBC AUTO-ENTMCNC: 33.8 G/DL (ref 31.5–36.5)
MCHC RBC AUTO-ENTMCNC: 33.8 G/DL (ref 31.5–36.5)
MCV RBC AUTO: 89 FL (ref 78–100)
MCV RBC AUTO: 91 FL (ref 78–100)
MONOCYTES # BLD AUTO: 0.6 10E9/L (ref 0–1.3)
MONOCYTES # BLD AUTO: 0.8 10E9/L (ref 0–1.3)
MONOCYTES NFR BLD AUTO: 10.1 %
MONOCYTES NFR BLD AUTO: 7 %
NEUTROPHILS # BLD AUTO: 2.9 10E9/L (ref 1.6–8.3)
NEUTROPHILS # BLD AUTO: 3.6 10E9/L (ref 1.6–8.3)
NEUTROPHILS NFR BLD AUTO: 39.3 %
NEUTROPHILS NFR BLD AUTO: 42.4 %
NRBC # BLD AUTO: 0 10*3/UL
NRBC # BLD AUTO: 0 10*3/UL
NRBC BLD AUTO-RTO: 0 /100
NRBC BLD AUTO-RTO: 0 /100
PLATELET # BLD AUTO: 335 10E9/L (ref 150–450)
PLATELET # BLD AUTO: 374 10E9/L (ref 150–450)
POTASSIUM SERPL-SCNC: 4.2 MMOL/L (ref 3.4–5.3)
POTASSIUM SERPL-SCNC: 4.7 MMOL/L (ref 3.4–5.3)
PROT SERPL-MCNC: 7.2 G/DL (ref 6.8–8.8)
PROT SERPL-MCNC: 7.3 G/DL (ref 6.8–8.8)
RBC # BLD AUTO: 4.73 10E12/L (ref 3.8–5.2)
RBC # BLD AUTO: 4.8 10E12/L (ref 3.8–5.2)
SODIUM SERPL-SCNC: 134 MMOL/L (ref 133–144)
SODIUM SERPL-SCNC: 136 MMOL/L (ref 133–144)
TROPONIN I SERPL-MCNC: <0.015 UG/L (ref 0–0.04)
TROPONIN I SERPL-MCNC: <0.015 UG/L (ref 0–0.04)
WBC # BLD AUTO: 7.4 10E9/L (ref 4–11)
WBC # BLD AUTO: 8.4 10E9/L (ref 4–11)

## 2020-01-01 PROCEDURE — 36415 COLL VENOUS BLD VENIPUNCTURE: CPT | Performed by: INTERNAL MEDICINE

## 2020-01-01 PROCEDURE — 84484 ASSAY OF TROPONIN QUANT: CPT | Performed by: EMERGENCY MEDICINE

## 2020-01-01 PROCEDURE — 99283 EMERGENCY DEPT VISIT LOW MDM: CPT | Mod: Z6 | Performed by: INTERNAL MEDICINE

## 2020-01-01 PROCEDURE — 93010 ELECTROCARDIOGRAM REPORT: CPT | Mod: 76 | Performed by: INTERNAL MEDICINE

## 2020-01-01 PROCEDURE — 73030 X-RAY EXAM OF SHOULDER: CPT | Mod: TC,RT

## 2020-01-01 PROCEDURE — 99283 EMERGENCY DEPT VISIT LOW MDM: CPT

## 2020-01-01 PROCEDURE — 80053 COMPREHEN METABOLIC PANEL: CPT | Performed by: INTERNAL MEDICINE

## 2020-01-01 PROCEDURE — 96365 THER/PROPH/DIAG IV INF INIT: CPT

## 2020-01-01 PROCEDURE — 25000128 H RX IP 250 OP 636: Performed by: EMERGENCY MEDICINE

## 2020-01-01 PROCEDURE — 99285 EMERGENCY DEPT VISIT HI MDM: CPT | Mod: Z6 | Performed by: EMERGENCY MEDICINE

## 2020-01-01 PROCEDURE — 72125 CT NECK SPINE W/O DYE: CPT | Mod: TC

## 2020-01-01 PROCEDURE — 36415 COLL VENOUS BLD VENIPUNCTURE: CPT | Performed by: EMERGENCY MEDICINE

## 2020-01-01 PROCEDURE — 93005 ELECTROCARDIOGRAM TRACING: CPT

## 2020-01-01 PROCEDURE — 85025 COMPLETE CBC W/AUTO DIFF WBC: CPT | Performed by: INTERNAL MEDICINE

## 2020-01-01 PROCEDURE — 80320 DRUG SCREEN QUANTALCOHOLS: CPT | Performed by: INTERNAL MEDICINE

## 2020-01-01 PROCEDURE — 93010 ELECTROCARDIOGRAM REPORT: CPT | Performed by: INTERNAL MEDICINE

## 2020-01-01 PROCEDURE — 73721 MRI JNT OF LWR EXTRE W/O DYE: CPT | Mod: 50

## 2020-01-01 PROCEDURE — 83735 ASSAY OF MAGNESIUM: CPT | Performed by: EMERGENCY MEDICINE

## 2020-01-01 PROCEDURE — 73560 X-RAY EXAM OF KNEE 1 OR 2: CPT | Mod: TC,RT

## 2020-01-01 PROCEDURE — 99285 EMERGENCY DEPT VISIT HI MDM: CPT | Mod: 25

## 2020-01-01 PROCEDURE — 80053 COMPREHEN METABOLIC PANEL: CPT | Performed by: EMERGENCY MEDICINE

## 2020-01-01 PROCEDURE — V5299 HEARING SERVICE: HCPCS

## 2020-01-01 PROCEDURE — 80320 DRUG SCREEN QUANTALCOHOLS: CPT | Performed by: EMERGENCY MEDICINE

## 2020-01-01 PROCEDURE — 85025 COMPLETE CBC W/AUTO DIFF WBC: CPT | Performed by: EMERGENCY MEDICINE

## 2020-01-01 PROCEDURE — 70450 CT HEAD/BRAIN W/O DYE: CPT | Mod: TC

## 2020-01-01 PROCEDURE — 25800030 ZZH RX IP 258 OP 636: Performed by: EMERGENCY MEDICINE

## 2020-01-01 RX ORDER — SODIUM CHLORIDE 9 MG/ML
INJECTION, SOLUTION INTRAVENOUS
Status: DISCONTINUED
Start: 2020-01-01 | End: 2020-01-01 | Stop reason: HOSPADM

## 2020-01-01 RX ORDER — THIAMINE HYDROCHLORIDE 100 MG/ML
INJECTION, SOLUTION INTRAMUSCULAR; INTRAVENOUS
Status: DISCONTINUED
Start: 2020-01-01 | End: 2020-01-01 | Stop reason: HOSPADM

## 2020-01-01 RX ORDER — KETOROLAC TROMETHAMINE 30 MG/ML
30 INJECTION, SOLUTION INTRAMUSCULAR; INTRAVENOUS ONCE
Status: DISCONTINUED | OUTPATIENT
Start: 2020-01-01 | End: 2020-01-01 | Stop reason: HOSPADM

## 2020-01-01 RX ADMIN — THIAMINE HYDROCHLORIDE 200 MG: 100 INJECTION, SOLUTION INTRAMUSCULAR; INTRAVENOUS at 00:53

## 2020-01-01 ASSESSMENT — ENCOUNTER SYMPTOMS
ANAL BLEEDING: 0
SHORTNESS OF BREATH: 0
SLEEP DISTURBANCE: 0
SHORTNESS OF BREATH: 0
COLOR CHANGE: 0
VOICE CHANGE: 0
NAUSEA: 0
DIARRHEA: 0
NECK PAIN: 0
FEVER: 0
ABDOMINAL PAIN: 0
LIGHT-HEADEDNESS: 0
DIFFICULTY URINATING: 0
CONFUSION: 0
CHILLS: 0
NECK STIFFNESS: 0
NECK STIFFNESS: 0
VOMITING: 0
NECK PAIN: 1
FLANK PAIN: 0
BRUISES/BLEEDS EASILY: 0
HEADACHES: 0
MYALGIAS: 0
SPEECH DIFFICULTY: 0
PALPITATIONS: 0
EYE REDNESS: 0
COUGH: 0
ABDOMINAL DISTENTION: 0
ARTHRALGIAS: 0
BACK PAIN: 0
EYE DISCHARGE: 0
ABDOMINAL PAIN: 0
HALLUCINATIONS: 0
CHILLS: 0
ARTHRALGIAS: 0
HEADACHES: 0
APPETITE CHANGE: 0
DIAPHORESIS: 0
BACK PAIN: 1
WOUND: 0
EYE REDNESS: 0
HEMATURIA: 0
CONFUSION: 0
NUMBNESS: 0
ACTIVITY CHANGE: 0
WHEEZING: 0
NERVOUS/ANXIOUS: 1
DIFFICULTY URINATING: 0
BLOOD IN STOOL: 0
FEVER: 0
DIZZINESS: 0
CHEST TIGHTNESS: 0
COLOR CHANGE: 0
WEAKNESS: 0
ADENOPATHY: 0
DYSURIA: 0

## 2020-01-01 ASSESSMENT — LIFESTYLE VARIABLES: INTOXICATION: 1

## 2020-05-02 NOTE — ED AVS SNAPSHOT
[facility]  [address] [address]  Phone:  [phone]                                    Jimena Mensah   MRN: [id number]    Department:  [facility]   Date of Visit:  5/2/2020           After Visit Summary Signature Page    I have received my discharge instructions, and my questions have been answered. I have discussed any challenges I see with this plan with the nurse or doctor.    ..........................................................................................................................................  Patient/Patient Representative Signature      ..........................................................................................................................................  Patient Representative Print Name and Relationship to Patient    ..................................................               ................................................  Date                                   Time    ..........................................................................................................................................  Reviewed by Signature/Title    ...................................................              ..............................................  Date                                               Time          22EPIC Rev 08/18

## 2020-05-03 NOTE — ED PROVIDER NOTES
History     Chief Complaint   Patient presents with     Alcohol Intoxication     The history is provided by the patient.   Alcohol Intoxication   Severity:  Moderate  Onset quality:  Gradual  Timing:  Constant  Progression:  Worsening  Chronicity:  Recurrent  Suspected agents:  Alcohol  Associated symptoms: no abdominal pain, no confusion, no headaches, no nausea, no palpitations, no shortness of breath, no suicidal ideation and no vomiting        Allergies:  Allergies   Allergen Reactions     Cats      Other reaction(s): *Unknown  Cat/feline product derivatives  Fur-animal dander     Dust Mite Extract      Other reaction(s): *Unknown  Trees, pollen, ragweed     Dust Mites      House dust     Fluticasone      Other reaction(s): Headache     Fluticasone Propionate Other (See Comments)     Flonase  headaches     Mometasone Other (See Comments)     Mometasone Furoate      Nasonex  Headaches     Ragweeds      Trees        Problem List:    Patient Active Problem List    Diagnosis Date Noted     Colon cancer stage II 05/01/2013     Priority: Medium     Alcohol abuse 05/01/2013     Priority: Medium     Smoker 05/01/2013     Priority: Medium     Liver failure (H) 05/01/2013     Priority: Medium     Chronic neck pain 05/01/2013     Priority: Medium     Hypothyroidism 05/01/2013     Priority: Medium     Secondary to thyroid ablation       COPD (chronic obstructive pulmonary disease) (H) 05/01/2013     Priority: Medium     Balance disorder 05/01/2013     Priority: Medium     Secondary to nerve 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 Provider to review          Past Medical History:    Past Medical History:   Diagnosis Date     Alcoholism (H)       Allergic rhinitis, cause unspecified 2012     Arthritis      Follow-up examination, following other surgery 2012     Hallucinations 10/23/2008     Hypertension      Mixed hearing loss, unspecified 2012     Sensorineural hearing loss, unspecified 2012     Thyroid disease        Past Surgical History:    Past Surgical History:   Procedure Laterality Date     bleeding,fibroids      NOBLE/BSO     cancer      bowel resection;      CARDIAC SURGERY      angiogram      delivery x 2       COLONOSCOPY       COLONOSCOPY  6/3/2013    Procedure: COLONOSCOPY;  COLONSCOPY WITH POSSIBLE BIOPSY;  Surgeon: Yudy Kamara DO;  Location: [facility] OR     COLONOSCOPY N/A 10/23/2014    Procedure: COLONOSCOPY;  Surgeon: Jordan Stephenson MD;  Location: [facility] OR     COLONOSCOPY - HIM SCAN  2006    Colonoscopy/flex diagnostic 2006     COLONOSCOPY - HIM SCAN  2008    Colonoscopy/flex diagnostic     COLONOSCOPY - HIM SCAN  2004    Colonoscopy with bx-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 3/24/2004     COLONOSCOPY - HIM SCAN  2005    Colonoscopy with bx-mild inflammatory change consistent with anastomotic site 2005     GYN SURGERY      hysterectomy     PHACOEMULSIFICATION WITH STANDARD INTRAOCULAR LENS IMPLANT Left 10/16/2018    Procedure: CATARACT EXTRACTION LEFT EYE WITH IMPLANT;  Surgeon: Jamarcus Pham MD;  Location: [facility] OR     PHACOEMULSIFICATION WITH STANDARD INTRAOCULAR LENS IMPLANT Right 2018    Procedure: RIGHT CATARACT EXTRACTION WITH IMPLANT;  Surgeon: Jamarcus Pham MD;  Location: [facility] OR     Rotator cuff repair LT       sigmoid Diverticulitis      colonoscopy with polypectomy; 3/22/2010; Provider GERALD CHIAYANG      sinus surgery x 2       SOFT TISSUE SURGERY      lesion removal     TYMPANOMASTOIDECTOMY      RT       Family History:    Family History   Problem Relation Age of Onset     Cancer Mother 39         colon, cause of death     Cancer Maternal Uncle         bone     Cancer Maternal Uncle         bone     Cancer Other         colon, grandmother     Heart Disease Maternal Grandmother         heart disease     Hypertension Maternal Grandmother      Heart Disease Paternal Uncle         heart disease     Heart Disease Paternal Uncle         heart disease       Social History:  Marital Status:  Single [1]  Social History     Tobacco Use     Smoking status: Former Smoker     Packs/day: 0.30     Years: 30.00     Pack years: 9.00     Types: Cigarettes     Last attempt to quit: 3/23/2016     Years since quittin.1     Smokeless tobacco: Never Used     Tobacco comment: passive exposure: yes   Substance Use Topics     Alcohol use: No     Drug use: No        Medications:    albuterol (2.5 MG/3ML) 0.083% neb solution  albuterol (PROAIR HFA, PROVENTIL HFA, VENTOLIN HFA) 108 (90 BASE) MCG/ACT inhaler  albuterol (PROAIR HFA/PROVENTIL HFA/VENTOLIN HFA) 108 (90 BASE) MCG/ACT Inhaler  albuterol (PROVENTIL) (5 MG/ML) 0.5% nebulizer solution  atorvastatin (LIPITOR) 10 MG tablet  bisacodyl (DULCOLAX) 5 MG EC tablet  cetirizine (ZYRTEC) 10 MG tablet  chlorhexidine (PERIDEX) 0.12 % solution  Citalopram Hydrobromide (CELEXA PO)  diazepam (VALIUM) 2 MG tablet  diclofenac-misoprostol (ARTHROTEC 75) 75-0.2 MG per EC tablet  EPINEPHrine (EPIPEN) 0.3 MG/0.3ML injection  GABAPENTIN PO  hydrochlorothiazide (HYDRODIURIL) 25 MG tablet  ipratropium - albuterol 0.5 mg/2.5 mg/3 mL (DUONEB) 0.5-2.5 (3) MG/3ML nebulization  levothyroxine (SYNTHROID/LEVOTHROID) 112 MCG tablet  olopatadine (PATANOL) 0.1 % ophthalmic solution  oxyCODONE-acetaminophen (PERCOCET)  MG per tablet  pimecrolimus (ELIDEL) 1 % cream  polycarbophil (FIBERCON) 625 MG tablet  tolterodine (DETROL LA) 4 MG 24 hr capsule  traZODone (DESYREL) 50 MG tablet  triamcinolone (KENALOG) 0.1 % cream  triamcinolone (NASACORT) 55 MCG/ACT nasal inhaler          Review of Systems    Constitutional: Negative for chills, diaphoresis and fever.   HENT: Negative for congestion and voice change.    Eyes: Negative for redness and visual disturbance.   Respiratory: Negative for cough, chest tightness, shortness of breath and wheezing.    Cardiovascular: Negative for chest pain, palpitations and leg swelling.   Gastrointestinal: Negative for abdominal distention, abdominal pain, anal bleeding, blood in stool, nausea and vomiting.   Genitourinary: Negative for decreased urine volume, difficulty urinating, dysuria, flank pain and hematuria.   Musculoskeletal: Positive for neck pain. Negative for arthralgias, back pain, gait problem, myalgias and neck stiffness.   Skin: Negative for color change, pallor, rash and wound.   Neurological: Negative for dizziness, syncope, light-headedness, numbness and headaches.   Psychiatric/Behavioral: Negative for confusion and suicidal ideas.   All other systems reviewed and are negative.      Physical Exam   BP: 143/77  Pulse: 92  Heart Rate: 92  Temp: 98.3  F (36.8  C)  Resp: 14  SpO2: 93 %      Physical Exam  Vitals signs and nursing note reviewed.   Constitutional:       Appearance: She is well-developed.   HENT:      Head: Normocephalic and atraumatic.      Mouth/Throat:      Pharynx: No oropharyngeal exudate.   Eyes:      Conjunctiva/sclera: Conjunctivae normal.      Pupils: Pupils are equal, round, and reactive to light.   Neck:      Musculoskeletal: Normal range of motion and neck supple.      Thyroid: No thyromegaly.      Vascular: No JVD.      Trachea: No tracheal deviation.   Cardiovascular:      Rate and Rhythm: Normal rate and regular rhythm.      Heart sounds: Normal heart sounds. No murmur. No friction rub. No gallop.    Pulmonary:      Effort: Pulmonary effort is normal. No respiratory distress.      Breath sounds: Normal breath sounds. No stridor. No wheezing or rales.   Chest:      Chest wall: No tenderness.   Abdominal:      General: Bowel sounds are  normal. There is no distension.      Palpations: Abdomen is soft. There is no mass.      Tenderness: There is no abdominal tenderness. There is no guarding or rebound.   Musculoskeletal: Normal range of motion.         General: No tenderness.   Lymphadenopathy:      Cervical: No cervical adenopathy.   Skin:     General: Skin is warm and dry.      Coloration: Skin is not pale.      Findings: No erythema or rash.   Neurological:      Mental Status: She is alert and oriented to person, place, and time.   Psychiatric:         Behavior: Behavior normal.         ED Course        Procedures                   Results for orders placed or performed during the hospital encounter of 05/02/20 (from the past 24 hour(s))   Alcohol ethyl   Result Value Ref Range    Ethanol g/dL 0.30 (H) 0.01 g/dL   CBC with platelets differential   Result Value Ref Range    WBC 8.4 4.0 - 11.0 10e9/L    RBC Count 4.80 3.8 - 5.2 10e12/L    Hemoglobin 14.8 11.7 - 15.7 g/dL    Hematocrit 43.8 35.0 - 47.0 %    MCV 91 78 - 100 fl    MCH 30.8 26.5 - 33.0 pg    MCHC 33.8 31.5 - 36.5 g/dL    RDW 14.1 10.0 - 15.0 %    Platelet Count 335 150 - 450 10e9/L    Diff Method Automated Method     % Neutrophils 42.4 %    % Lymphocytes 45.4 %    % Monocytes 7.0 %    % Eosinophils 3.7 %    % Basophils 1.1 %    % Immature Granulocytes 0.4 %    Nucleated RBCs 0 0 /100    Absolute Neutrophil 3.6 1.6 - 8.3 10e9/L    Absolute Lymphocytes 3.8 0.8 - 5.3 10e9/L    Absolute Monocytes 0.6 0.0 - 1.3 10e9/L    Absolute Eosinophils 0.3 0.0 - 0.7 10e9/L    Absolute Basophils 0.1 0.0 - 0.2 10e9/L    Abs Immature Granulocytes 0.0 0 - 0.4 10e9/L    Absolute Nucleated RBC 0.0    Comprehensive metabolic panel   Result Value Ref Range    Sodium 136 133 - 144 mmol/L    Potassium 4.7 3.4 - 5.3 mmol/L    Chloride 102 94 - 109 mmol/L    Carbon Dioxide 28 20 - 32 mmol/L    Anion Gap 6 3 - 14 mmol/L    Glucose 89 70 - 99 mg/dL    Urea Nitrogen 6 (L) 7 - 30 mg/dL    Creatinine 0.62 0.52 - 1.04  mg/dL    GFR Estimate >90 >60 mL/min/[1.73_m2]    GFR Estimate If Black >90 >60 mL/min/[1.73_m2]    Calcium 8.8 8.5 - 10.1 mg/dL    Bilirubin Total 0.2 0.2 - 1.3 mg/dL    Albumin 3.7 3.4 - 5.0 g/dL    Protein Total 7.2 6.8 - 8.8 g/dL    Alkaline Phosphatase 85 40 - 150 U/L    ALT 47 0 - 50 U/L    AST 29 0 - 45 U/L       Medications - No data to display    Assessments & Plan (with Medical Decision Making)   Alcohol intoxication   Chronic neck, no change in severity  Pt observed in ER, slept and woke up, reported feeling much better , asked to be discharged home, AAox3, no acute distress  D C Home  I have reviewed the nursing notes.    I have reviewed the findings, diagnosis, plan and need for follow up with the patient.      Discharge Medication List as of 5/3/2020  5:36 AM          Final diagnoses:   Alcoholic intoxication without complication (H)       5/2/2020   [facility]     Gte Fields MD  05/03/20 2025

## 2020-05-03 NOTE — ED NOTES
Assisted to commode. Voided 600 ml, clear, pale urine. Pt alert and oriented. States she called her life alert because she somehow went down to the floor because of pain in her knee. States not a fall, but easy decent to floor, but couldn't get back up herself. Pt transferred self from  to [facility] with stand-by assist, but complaints of knee pain with standing.

## 2020-05-03 NOTE — ED NOTES
EMS responded to pt residence because Life Alert went off. Pt required lift assist, but EMS decided to bring her for eval because of her spinal stenosis and neck pain, and also she was intoxicated. Pt is wearing her own neck collar r/t chronic neck pain - she uses the collar for comfort.

## 2020-05-15 NOTE — ED AVS SNAPSHOT
[facility]  [address] [address]  Phone:  [phone]                                    Jimena Mensah   MRN: [id number]    Department:  [facility]   Date of Visit:  5/15/2020           After Visit Summary Signature Page    I have received my discharge instructions, and my questions have been answered. I have discussed any challenges I see with this plan with the nurse or doctor.    ..........................................................................................................................................  Patient/Patient Representative Signature      ..........................................................................................................................................  Patient Representative Print Name and Relationship to Patient    ..................................................               ................................................  Date                                   Time    ..........................................................................................................................................  Reviewed by Signature/Title    ...................................................              ..............................................  Date                                               Time          22EPIC Rev 08/18

## 2020-05-16 NOTE — ED NOTES
Unit assistant reports that patient was incontinent of urine and while getting cleaned up and changed patient did need a minimum of a assist of 1 with standing. Patient only able to stand for a minute before needing to sit down.

## 2020-05-16 NOTE — DISCHARGE INSTRUCTIONS
The Senior Linkage Line can be reached at [phone].    *[facility]  is [place], you can reach her at [phone] if needs arise after discharge.     [employer] is for any [place] looking for free support to quit smoking, vaping or chewing.   [employer] will offer many quit support options and resources so [place] residents can continue to find the way to quit that works best for them.   Free support includes personalized coaching, email and text support, educational materials, and quit medication (nicotine patches, gum or lozenges) delivered by mail.     Contact [employer] at 6-293-QUIT-NOW or online at [employer] to receive support on your quit journey.      NUMBERS TO CALL IN CRISIS    National Suicide Prevention Lifeline ([facility])  1-527.976.7848    Crisis Text Line ([place])  Text  HOME  to 951331    Mental Health Crisis Line ([place], [place])  970.266.6008    [facility] Mobile Crisis ([place], [place])   [phone]      If you are feeling very unsafe, call 911 or bring yourself to the Emergency Room        Counseling in [place]:  Jeff Ibarra           [phone]  [facility]    Tressa Chittenango, [facility]      [phone]  [employer] [employer] [facility]     Shanon Booker, [facility] (young kids)    [phone]   Rebecca Faustin [facility] (older kids & adults)  [phone]   Fer Guerin [facility]      [phone]  [facility]       [phone]   Usman Tellez MS, LP   Karine Haywood MA, LPC   Maycol Morales MS psychotherapist   Jerri Lr MS, LPC   Clary Magdaleno, [facility], counselor   Yudy Tellez [facility], counselor  Ericka        [phone]  Darren Wiseman, counseling  Dr Jaida Rousseau, counseling  Rosita Holden, psychiatry   [facility]       [phone]   Smitha Kimball MA, LMFT, FS   Angelica Lindo MS, [facility]          [phone]  [facility]  Counseling      [phone]   Yudyjohn Rousseau MS, [facility]          [phone]        Jackie Pelaez MSW, LIC , C-MI   Jill Bojorquez MSW, LGSW                                                    Cy Hernandez LGSW      Venice Olivajacquelyn MS, [facility]                [phone]      Mayra Marley PsyD     Fransisca Del Castillo PsyD, owner      Eleni Nuñez PsyD    Lisa Saini MPAS, PA-C    Suzie Soto PhD   Sapphire Paige MSW, LICSW [facility]       [phone]   Kena Foley [facility]   Tiago Mensah APRN, CNP,     Berenice Bundy MSW, LGSW (adolescents)   Jackie Grinnel APRN, CNP (Hib via telehealth; adolescents)   Marva GALLAGHER, CNP (Hib via telehealth)   Marvin Francois MA, LPC, BCIA (Hib via telehealth)   Angelica Montano [facility] MSW, SW   Sheela Thompson [facility]   Gregory Aguilar DNP, APRN, CNP   Kayla Washington RN, BSN   Damaris Dill RN-BC, BSN (Hib via telehealth)  [facility]         [phone]   Kym Ghotra, MSN, [facility]           [phone]   Vitaly Broussard MA, LMFT   Michlele Arnold MS RN [facility] NP   Yudy Hauser, [facility]         [phone]  [facility]       [phone]   [facility]   Erika Paredes (to be [facility])   Maximiliano Hauser (to be [facility])      Counseling in [place]:  [facility]       [phone]   mental health & CD counseling  Marvin Mata       [phone]  [facility]       [phone]  [facility]        [phone]   Smitha Cline  [facility]       [employer]              [phone]   can do weekends and evenings   DeLorr Sanford, MSW, LICSW, LCSW, CCTP    Gerald Monteiro MA, LMFT, [facility]  [place] [facility]       evenings, weekends  [phone]      Counseling in [place]:  [facility]         [phone]   Kym Ghotra, MSN, PMP-BC   Soledad Jaquez MA, [facility]  [facility]ing      [phone]  [facility]       [phone]   Babita Hogan PMFT  [facility]       Anastacia Bingham       [phone]  [place] [id number] [facility]ongo[facility]     [place]      [phone]  [facility]     Raoul Marie MS Ed, LMFT    [phone]    (kids) denotes providers who also see kids

## 2020-05-16 NOTE — ED NOTES
Patient states that she does wear oxygen at night. States that she has a concentrator and uses 2L.

## 2020-05-16 NOTE — ED NOTES
Breakfast was ordered for pt. 30 min. ETA for arrival.    Problem: Infection - Risk of, Surgical Site Infection Goal: *Absence of surgical site infection signs and symptoms Outcome: Progressing Towards Goal 
  
Problem: Patient Education: Go to Patient Education Activity Goal: Patient/Family Education Outcome: Progressing Towards Goal 
  
Problem: Pain Goal: *Control of Pain Outcome: Progressing Towards Goal 
Goal: *PALLIATIVE CARE:  Alleviation of Pain Outcome: Progressing Towards Goal 
  
Problem: Patient Education: Go to Patient Education Activity Goal: Patient/Family Education Outcome: Progressing Towards Goal 
  
Problem: Falls - Risk of 
Goal: *Absence of Falls Description Document Flory Longoria Fall Risk and appropriate interventions in the flowsheet. Outcome: Progressing Towards Goal 
Note:  
Fall Risk Interventions: 
  
 
  
 
Medication Interventions: Evaluate medications/consider consulting pharmacy, Patient to call before getting OOB History of Falls Interventions: Door open when patient unattended, Evaluate medications/consider consulting pharmacy Problem: Patient Education: Go to Patient Education Activity Goal: Patient/Family Education Outcome: Progressing Towards Goal 
  
Problem: Pressure Injury - Risk of 
Goal: *Prevention of pressure injury Description Document Earl Scale and appropriate interventions in the flowsheet. Outcome: Progressing Towards Goal 
Note:  
Pressure Injury Interventions: Activity Interventions: Pressure redistribution bed/mattress(bed type), Increase time out of bed Nutrition Interventions: Document food/fluid/supplement intake Friction and Shear Interventions: Minimize layers Problem: Patient Education: Go to Patient Education Activity Goal: Patient/Family Education Outcome: Progressing Towards Goal 
  
Problem: AMI: Day 4 Goal: Off Pathway (Use only if patient is Off Pathway) Outcome: Progressing Towards Goal 
Goal: Activity/Safety Outcome: Progressing Towards Goal 
Goal: Diagnostic Test/Procedures Outcome: Progressing Towards Goal 
Goal: Nutrition/Diet Outcome: Progressing Towards Goal 
Goal: Discharge Planning Outcome: Progressing Towards Goal 
Goal: Medications Outcome: Progressing Towards Goal 
Goal: Respiratory Outcome: Progressing Towards Goal 
Goal: Treatments/Interventions/Procedures Outcome: Progressing Towards Goal 
Goal: Psychosocial 
Outcome: Progressing Towards Goal 
Goal: *Optimal pain control at patient's stated goal 
Outcome: Progressing Towards Goal 
Goal: *Hemodynamically stable Outcome: Progressing Towards Goal 
Goal: *Demonstrates progressive activity Outcome: Progressing Towards Goal 
Goal: *Tolerating diet Outcome: Progressing Towards Goal 
  
Problem: AMI: Discharge Outcomes Goal: *Demonstrates ability to perform prescribed activity without shortness of breath or discomfort Outcome: Progressing Towards Goal 
Goal: *Verbalizes understanding and describes prescribed diet Outcome: Progressing Towards Goal 
Goal: *Describes follow-up/return visits to physicians Outcome: Progressing Towards Goal 
Goal: *Describes home care/support arrangements established based on need Outcome: Progressing Towards Goal 
Goal: *Anxiety reduced or absent Outcome: Progressing Towards Goal 
Goal: *Understands and describes signs and symptoms to report to providers(Stroke Metric) Outcome: Progressing Towards Goal 
Goal: *Verbalizes name, dosage, time, side effects, and number of days to continue medications Outcome: Progressing Towards Goal 
  
Problem: Patient Education: Go to Patient Education Activity Goal: Patient/Family Education Outcome: Progressing Towards Goal 
  
Problem: Cath Lab Procedures: Discharge Outcomes Goal: *Stable cardiac rhythm Outcome: Progressing Towards Goal 
Goal: *Hemodynamically stable Outcome: Progressing Towards Goal 
Goal: *Optimal pain control at patient's stated goal 
 Outcome: Progressing Towards Goal 
Goal: *Pulses palpable, skin color within defined limits, skin temperature warm Outcome: Progressing Towards Goal 
Goal: *Lungs clear or at baseline Outcome: Progressing Towards Goal 
Goal: *Demonstrates ability to perform prescribed activity without shortness of breath or discomfort Outcome: Progressing Towards Goal 
Goal: *Verbalizes home exercise program, activity guidelines, cardiac precautions Outcome: Progressing Towards Goal 
Goal: *Verbalizes understanding and describes prescribed diet Outcome: Progressing Towards Goal 
Goal: *Verbalizes understanding and describes medication purposes and frequencies Outcome: Progressing Towards Goal 
Goal: *Identifies cardiac risk factors Outcome: Progressing Towards Goal 
Goal: *No signs and symptoms of infection or wound complications Outcome: Progressing Towards Goal 
Goal: *Anxiety reduced or absent Outcome: Progressing Towards Goal 
Goal: *Verbalizes and demonstrates incision care Outcome: Progressing Towards Goal 
Goal: *Understands and describes signs and symptoms to report to providers(Stroke Metric) Outcome: Progressing Towards Goal 
Goal: *Describes follow-up/return visits to physicians Outcome: Progressing Towards Goal 
Goal: *Describes available resources and support systems Outcome: Progressing Towards Goal 
Goal: *Influenza immunization Outcome: Progressing Towards Goal 
Goal: *Pneumococcal immunization Outcome: Progressing Towards Goal 
  
Problem: Patient Education: Go to Patient Education Activity Goal: Patient/Family Education Outcome: Progressing Towards Goal 
  
Problem: CABG: Pre-Op Day Goal: Off Pathway (Use only if patient is Off Pathway) Outcome: Progressing Towards Goal 
Goal: Activity/Safety Outcome: Progressing Towards Goal 
Goal: Consults, if ordered Outcome: Progressing Towards Goal 
Goal: Diagnostic Test/Procedures Outcome: Progressing Towards Goal 
Goal: Nutrition/Diet Outcome: Progressing Towards Goal 
Goal: Medications Outcome: Progressing Towards Goal 
Goal: Treatments/Interventions/Procedures Outcome: Progressing Towards Goal 
Goal: Discharge Planning Outcome: Progressing Towards Goal 
Goal: Psychosocial 
Outcome: Progressing Towards Goal 
Goal: *Hemodynamically stable Outcome: Progressing Towards Goal 
Goal: *Consent obtained, permits and diagnostics complete Outcome: Progressing Towards Goal 
  
Problem: CABG: Discharge Outcomes Goal: *Hemodynamically stable Outcome: Progressing Towards Goal 
Goal: *Stable cardiac rhythm Outcome: Progressing Towards Goal 
Goal: *Lungs clear or at baseline Outcome: Progressing Towards Goal 
Goal: *Demonstrates ability to perform prescribed activity without shortness of breath or discomfort Outcome: Progressing Towards Goal 
Goal: *Verbalizes home exercise program, activity guidelines, cardiac precautions Outcome: Progressing Towards Goal 
Goal: *Optimal pain control at patient's stated goal 
Outcome: Progressing Towards Goal 
Goal: *Verbalizes understanding and describes prescribed diet Outcome: Progressing Towards Goal 
Goal: *Verbalizes name, dosage, time, side effects, and number of days to continue medications Outcome: Progressing Towards Goal 
Goal: *Weight  is stable Outcome: Progressing Towards Goal 
Goal: *No signs and symptoms of infection or wound complications Outcome: Progressing Towards Goal 
Goal: *Anxiety reduced or absent Outcome: Progressing Towards Goal 
Goal: *Verbalizes and demonstrates incision care Outcome: Progressing Towards Goal 
Goal: *Understands and describes signs and symptoms to report to providers(Stroke Metric) Outcome: Progressing Towards Goal 
Goal: *Describes follow-up/return visits to physicians Outcome: Progressing Towards Goal 
Goal: *Describes available resources and support systems Outcome: Progressing Towards Goal 
Goal: *Expresses feelings about diagnosis and surgery Outcome: Progressing Towards Goal 
Goal: *Influenza immunization Outcome: Progressing Towards Goal 
Goal: *Pneumococcal immunization Outcome: Progressing Towards Goal 
  
Problem: Infection - Risk of, Central Venous Catheter-Associated Bloodstream Infection Goal: *Absence of infection signs and symptoms Outcome: Progressing Towards Goal 
  
Problem: Patient Education: Go to Patient Education Activity Goal: Patient/Family Education Outcome: Progressing Towards Goal

## 2020-05-16 NOTE — ED NOTES
Spoke with Dunia, , after her conversation with patient. Dunia states at this time time she has no resources for patient. Patient tells Dunia that she has a cane, walker and wheelchair at home. Patient does have a county  and Dunia states that she will make contact with that person. Dunia suggests contacting patient's children, who are local to [place], to check in on patient today and tomorrow.

## 2020-05-16 NOTE — ED NOTES
Face to face report given with opportunity to observe patient.    Report given to TUAN Herrera RN   5/16/2020  10:04 AM

## 2020-05-16 NOTE — ED NOTES
Brought patient a meal and helped her set it up. Sat head of bed up as well. Patient was able to open own food and is answering questions appropriately.

## 2020-05-16 NOTE — ED NOTES
"Patient arrives via [facility]. Patient lives alone in apartment building. Patient has been drinking beer tonight and her \"right knee went out\". Patient denies LOC, states the first thing she did was press her life alert button. Patient states that she has had a couple falls. Patient states that she does have a PCA 2 days/week. Patient incontinent of urine, but states that she has \"a bladder problem\" and incontinence is not new.   "

## 2020-05-16 NOTE — ED NOTES
Discharge instructions reviewed with patient with no questions or concerns. Halley,  at [facility], number given to patient. [facility] transporting patient. Peter, patient's son will be there to help patient on arrival to apartment.

## 2020-05-16 NOTE — PROGRESS NOTES
"Assessment completed by phone call with Jimena.    LOC: alert and oriented x3, but at times had difficulty remembering minor details. Voiced feeling that her mind is foggy and attributes this to her alcohol consumption    Dx: fall, ETOH  Chronic Disease Management: COPD, HTN, bipolar    Lives with: alone  Living at:  Her apartment at the [facility] in [place]; just moved there 10 days ago, was previously at the [facility] but says it was too hard to live there due to the [place] (hard to walk and use her w/c)  Transportation: YES [facility] for medical appointment, also takes the bus using either her walker or her w/c    Primary PCP: Chantell Cook  Insurance:  [facility]    Support System:  Neighbors, family, PCA  Homecare/PCA: [facility] provides PCA services upto 9 hours/week, at this time she has a new (in the past two weeks) PCA who comes for 3 hours Mon and Tues's.   /County Services:   Frdea Schmidt is her [facility] worker. Freda was updated of her ED visit by phone message this morning, with a request that she return a call to this writer on Monday.  Email sent to [place] [facility]  Halley Driscoll to update on her visit and trend of falls and intoxication as well as resources provided.   : NO      How was the [facility] notification completed: na    Health Care Directive: YES lists her son Peter as her agent  Guardian: may  POA: not asked    Pharmacy: [facility]  Meds management: YES manages her meds independently using a 3 week pill box. Says \"I'm pretty sure I'm doing them right\" saying \"I've been on them for years\"    Adequate Resources for needs (housing, utilities, food/med): YES  Household chores: PCA  Work/community/social activity: YES as desired    ADLs: PCA helps with bathing, has otherwise been independent with other ADL's acknowledging that they are becoming more difficult  Ambulation:independent, primarily uses an electric " "w/c; uses a walker for taking steps, estimates that she can walk 10-20 feet with her walker.   Falls: thinks she has had about 3 falls recently; attributes her falls to her right knee buckling. Says she is working with Dr Vanegas on this.   Nutrition: PCA does most of her shopping, but sometimes she is able to get out to do this as well. She primarily buys foods that are easily prepared/heated up. She takes the bus to go to the liquor store.   Sleep: not asked    Equipment used: shower chair, grab bars, Life Alert, oxygen at night      Oxygen supplier: [facility] the supplier have valid oxygen orders: not addressed    Mental health: chart notes bipolar, she says she does not think she is bipolar. Talked about feeling very overwhelmed for the past 3 months and adds \"I guess I just gave up\". Talked about a stressful relationship with her last PCA, needing to pack/move/unpack and not having much help to do this, fear of falling d/t right knee, and a sense of general decline in her ability to care for herself. She is not currently working with a counselor. She was receptive of a list of counselors, should she decide to seek this care out.   Substance abuse: she smokes and has tried to quit. She is receptive of the [employer] information. Says she quit drinking about 10 years ago, but started drinking again in December when some of her stressors began. Denies any street drug use.   Exposure to violence/abuse: denies  Stressors: as above    Able to Return to Prior Living Arrangements: YES    Choice of Vendor: na    Barriers: not fully known    Plan: she feels she will be able to return home via [facility].     Says her w/c is in her apartment and that she will be able to transfer herself into it. Acknowledges that she feels weak and sick from being intoxicated. She was encouraged to have her adult children visit her over this weekend and/or have her neighbors check in on her and help " as needed. She says she can and will do this.     She is not interested in home PT nor outpatient PT as she says she started falling when she started PT.

## 2020-05-16 NOTE — ED NOTES
This writer spoke to Peter, patient's son who agreed to meet [facility] transportation at patient's apartment to help her inside and get her settled. Patient agreeable with plan.

## 2020-05-16 NOTE — ED PROVIDER NOTES
Patient handed over from Dr. Lara at shift change at 8 AM, please see her note.  Diagnosis: Alcohol intoxication.  Patient presented to the ED for the second time within the last 1 week with alcohol intoxication and feeling weak.  She also apparently fell with her knee giving way.  Plain films of the knee were negative for any acute fractures.  Patient monitored at the ED until 9 AM 1 repeat alcohol level was 0.1 g/dL.  Patient uses a walker at home and feels stable enough to be discharged home to use it at home.  A family member will meet the patient at home.   have been consulted and will follow up with patient as outpatient.  Discharged home stable condition.     Lavell Winslow MD  05/16/20 1021

## 2020-05-16 NOTE — ED NOTES
This writer and [facility] escorted patient to bathroom using walker. Patient alert, cooperative and steady on her feet. Dr. Winslow updated.

## 2020-05-16 NOTE — ED PROVIDER NOTES
"  History     Chief Complaint   Patient presents with     Fall     right knee \"went out\"     HPI  Jimena Mensah is a 64 year old female who  Had her right knee go out tonight at home.. She did not fall to the ground, and has minimal pain. The knee has been doing this before. She also has right shoulder pain and some bilateral pain  She has chronic neck pain. . She has not hit her head and has no fever, chills, nausea vomiting and no hallucinations. She denies other drugs of abuse.  She has not had any fever, stroke, weakness. Her left knee has undergone arthroscopic surgery; plans for surgery of the right are on hold at present.  She has no numbness or calf or foot pain     She has had at least a six pack of beer in conjunction w/a history of crinking regularly .. She  Was seen here for etoh intox on 5.2,  Shhe has a bipolar disorder and hx of liver failure and chronic neck pain.    she denies any chest pain, Nausea vomiting, cough or fever, chills, rash, chronic     Allergies:  Allergies   Allergen Reactions     Cats      Other reaction(s): *Unknown  Cat/feline product derivatives  Fur-animal dander     Dust Mite Extract      Other reaction(s): *Unknown  Trees, pollen, ragweed     Dust Mites      House dust     Fluticasone      Other reaction(s): Headache  Other reaction(s): Headache     Fluticasone Propionate Other (See Comments)     Flonase  headaches     Mometasone Other (See Comments)     Other reaction(s): Headache     Mometasone Furoate      Nasonex  Headaches     Ragweeds      Trees        Problem List:    Patient Active Problem List    Diagnosis Date Noted     Colon cancer stage II 05/01/2013     Priority: Medium     Alcohol abuse 05/01/2013     Priority: Medium     Smoker 05/01/2013     Priority: Medium     Liver failure (H) 05/01/2013     Priority: Medium     Chronic neck pain 05/01/2013     Priority: Medium     Hypothyroidism 05/01/2013     Priority: Medium     Secondary to thyroid ablation       " COPD (chronic obstructive pulmonary disease) (H) 2013     Priority: Medium     Balance disorder 2013     Priority: Medium     Secondary to nerve injury from neck trauma         Bipolar 1 disorder (H) 2013     Priority: Medium     Pulmonary nodule 2013     Priority: Medium     Sensorineural hearing loss, bilateral 2013     Priority: Medium     Mixed hearing loss 2012     Priority: Medium     Problem list name updated by automated process. Provider to review          Past Medical History:    Past Medical History:   Diagnosis Date     Alcoholism (H)      Allergic rhinitis, cause unspecified 2012     Arthritis      Follow-up examination, following other surgery 2012     Hallucinations 10/23/2008     Hypertension      Mixed hearing loss, unspecified 2012     Sensorineural hearing loss, unspecified 2012     Thyroid disease        Past Surgical History:    Past Surgical History:   Procedure Laterality Date     bleeding,fibroids      NOBLE/BSO     cancer      bowel resection;      CARDIAC SURGERY      angiogram      delivery x 2       COLONOSCOPY       COLONOSCOPY  6/3/2013    Procedure: COLONOSCOPY;  COLONSCOPY WITH POSSIBLE BIOPSY;  Surgeon: Yudy Kamara DO;  Location: [facility]     COLONOSCOPY N/A 10/23/2014    Procedure: COLONOSCOPY;  Surgeon: Jordan Stephenson MD;  Location: [facility]     COLONOSCOPY - HIM SCAN  2006    Colonoscopy/flex diagnostic 2006     COLONOSCOPY - HIM SCAN  2008    Colonoscopy/flex diagnostic     COLONOSCOPY - HIM SCAN  2004    Colonoscopy with bx-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 3/24/2004     COLONOSCOPY - HIM SCAN  2005    Colonoscopy with bx-mild inflammatory change consistent with anastomotic site 2005     GYN SURGERY      hysterectomy     PHACOEMULSIFICATION WITH STANDARD INTRAOCULAR LENS IMPLANT Left 10/16/2018    Procedure: CATARACT EXTRACTION  "LEFT EYE WITH IMPLANT;  Surgeon: Jamarcus Pham MD;  Location: [facility]     PHACOEMULSIFICATION WITH STANDARD INTRAOCULAR LENS IMPLANT Right 2018    Procedure: RIGHT CATARACT EXTRACTION WITH IMPLANT;  Surgeon: Jamarcus Pham MD;  Location: [facility]     Rotator cuff repair LT       sigmoid Diverticulitis      colonoscopy with polypectomy; 3/22/2010; Provider GERALD RANGEL      sinus surgery x 2  2000     SOFT TISSUE SURGERY      lesion removal     TYMPANOMASTOIDECTOMY  2011    RT       Family History:    Family History   Problem Relation Age of Onset     Cancer Mother 39        colon, cause of death     Cancer Maternal Uncle         bone     Cancer Maternal Uncle         bone     Cancer Other         colon, grandmother     Heart Disease Maternal Grandmother         heart disease     Hypertension Maternal Grandmother      Heart Disease Paternal Uncle         heart disease     Heart Disease Paternal Uncle         heart disease       Social History:  Marital Status:  Single [1]  Social History     Tobacco Use     Smoking status: Current Every Day Smoker     Packs/day: 0.50     Years: 30.00     Pack years: 15.00     Types: Cigarettes     Last attempt to quit: 3/23/2016     Years since quittin.1     Smokeless tobacco: Never Used     Tobacco comment: passive exposure: yes   Substance Use Topics     Alcohol use: Yes     Comment: beer, \"way, way a lot\"     Drug use: No        Medications:    albuterol (2.5 MG/3ML) 0.083% neb solution  albuterol (PROAIR HFA/PROVENTIL HFA/VENTOLIN HFA) 108 (90 BASE) MCG/ACT Inhaler  albuterol (PROVENTIL) (5 MG/ML) 0.5% nebulizer solution  atorvastatin (LIPITOR) 10 MG tablet  cetirizine (ZYRTEC) 10 MG tablet  Citalopram Hydrobromide (CELEXA PO)  diazepam (VALIUM) 2 MG tablet  diclofenac-misoprostol (ARTHROTEC 75) 75-0.2 MG per EC tablet  EPINEPHrine (EPIPEN) 0.3 MG/0.3ML injection  hydrochlorothiazide (HYDRODIURIL) 25 MG tablet  ipratropium - albuterol 0.5 mg/2.5 mg/3 mL (DUONEB) " 0.5-2.5 (3) MG/3ML nebulization  levothyroxine (SYNTHROID/LEVOTHROID) 112 MCG tablet  olopatadine (PATANOL) 0.1 % ophthalmic solution  tolterodine (DETROL LA) 4 MG 24 hr capsule  traZODone (DESYREL) 50 MG tablet  polycarbophil (FIBERCON) 625 MG tablet  triamcinolone (KENALOG) 0.1 % cream  triamcinolone (NASACORT) 55 MCG/ACT nasal inhaler          Review of Systems   Constitutional: Negative for activity change, appetite change, chills and fever.   HENT: Negative for congestion and ear discharge.    Eyes: Negative for discharge and redness.   Respiratory: Negative for shortness of breath.    Cardiovascular: Negative for chest pain.   Gastrointestinal: Negative for abdominal pain and diarrhea.   Endocrine: Negative for cold intolerance and heat intolerance.   Genitourinary: Negative for difficulty urinating and dyspareunia.   Musculoskeletal: Positive for back pain. Negative for arthralgias, neck pain and neck stiffness.   Skin: Negative for color change.   Neurological: Negative for speech difficulty, weakness and headaches.   Hematological: Negative for adenopathy. Does not bruise/bleed easily.   Psychiatric/Behavioral: Negative for confusion, hallucinations, self-injury, sleep disturbance and suicidal ideas. The patient is nervous/anxious.    All other systems reviewed and are negative.      Physical Exam   BP: 125/80  Pulse: 80  Heart Rate: 84  Temp: 98.2  F (36.8  C)  Resp: 18  SpO2: 93 %      Physical Exam  Nursing note reviewed.   Constitutional:       Comments: Obese but intoxicated female, anxious but not hallucinating   HENT:      Right Ear: Ear canal normal.      Left Ear: Ear canal normal.      Mouth/Throat:      Mouth: Mucous membranes are moist.      Pharynx: No oropharyngeal exudate.   Eyes:      Extraocular Movements: Extraocular movements intact.      Conjunctiva/sclera: Conjunctivae normal.      Pupils: Pupils are equal, round, and reactive to light.   Cardiovascular:      Rate and Rhythm: Normal  rate and regular rhythm.      Pulses: Normal pulses.      Heart sounds: Normal heart sounds. No murmur. No friction rub.   Pulmonary:      Effort: No respiratory distress.      Breath sounds: No wheezing, rhonchi or rales.   Abdominal:      General: Abdomen is flat. Bowel sounds are normal. There is no distension.      Tenderness: There is no right CVA tenderness or left CVA tenderness.      Hernia: No hernia is present.   Musculoskeletal:      Right lower leg: No edema.      Left lower leg: No edema.      Comments: No deformity or bruising or ecchymoses of the leg. Normal pulses. No leg foreshortening  Has some tenderness without laxity of the MCL area. NO swelling or ecchymoses noted.    Skin:     Capillary Refill: Capillary refill takes less than 2 seconds.      Coloration: Skin is not jaundiced.      Findings: No bruising or lesion.   Neurological:      General: No focal deficit present.      Mental Status: She is alert and oriented to person, place, and time.      Cranial Nerves: No cranial nerve deficit.      Sensory: No sensory deficit.      Motor: No weakness.      Coordination: Coordination normal.   Psychiatric:         Mood and Affect: Mood normal.         Thought Content: Thought content normal.      Comments: Anxious cooperative, voices a lot of symptoms, etoh on breath with no hallucinations or SI or HI         ED Course     ED Course as of May 16 1053   Sat May 16, 2020   0051 Visited pt and reviewed films       0052 Plain films of knee are neg, patellar view skipped but pt is not symptomatic there.       0153 Monitor changed,  repeatk ekg done 0152 SR      0153 WBC: 7.4   0154 Monitor changed and repeat EKG  nsr at 88, normal st, LAD, no changefrom prior. Repeat troponin is pending      0201 PT ASKING FOR PAIN MEDS.     CANNOT GIVE TYLENOL.  KNEE IMMOB PLACED.  WILL GIVE SINGLE DOSE OF TORADOL      0222 Results of neck films reviwed with pt..   Pt  given toradol for pain.  Plain films of knee and  also the Right shoulder are without fx per my read, also no dislocation        0653 Pt rechecked, she is uncoverred, Incontinent of urine. Not able to get up easily on her own  She let me know that she had braces at home. States uses her clay chair to get about.  She is  getting sofia tox due to lack of bladder control and wears depends or equivalent.  Was here for fall at home late in the evening before with similar  symptoms..          Procedures              Results for orders placed or performed during the hospital encounter of 05/15/20 (from the past 24 hour(s))   CBC with platelets differential   Result Value Ref Range    WBC 7.4 4.0 - 11.0 10e9/L    RBC Count 4.73 3.8 - 5.2 10e12/L    Hemoglobin 14.3 11.7 - 15.7 g/dL    Hematocrit 42.3 35.0 - 47.0 %    MCV 89 78 - 100 fl    MCH 30.2 26.5 - 33.0 pg    MCHC 33.8 31.5 - 36.5 g/dL    RDW 14.1 10.0 - 15.0 %    Platelet Count 374 150 - 450 10e9/L    Diff Method Automated Method     % Neutrophils 39.3 %    % Lymphocytes 46.6 %    % Monocytes 10.1 %    % Eosinophils 2.6 %    % Basophils 0.9 %    % Immature Granulocytes 0.5 %    Nucleated RBCs 0 0 /100    Absolute Neutrophil 2.9 1.6 - 8.3 10e9/L    Absolute Lymphocytes 3.5 0.8 - 5.3 10e9/L    Absolute Monocytes 0.8 0.0 - 1.3 10e9/L    Absolute Eosinophils 0.2 0.0 - 0.7 10e9/L    Absolute Basophils 0.1 0.0 - 0.2 10e9/L    Abs Immature Granulocytes 0.0 0 - 0.4 10e9/L    Absolute Nucleated RBC 0.0    Comprehensive metabolic panel   Result Value Ref Range    Sodium 134 133 - 144 mmol/L    Potassium 4.2 3.4 - 5.3 mmol/L    Chloride 101 94 - 109 mmol/L    Carbon Dioxide 23 20 - 32 mmol/L    Anion Gap 10 3 - 14 mmol/L    Glucose 83 70 - 99 mg/dL    Urea Nitrogen 7 7 - 30 mg/dL    Creatinine 0.57 0.52 - 1.04 mg/dL    GFR Estimate >90 >60 mL/min/[1.73_m2]    GFR Estimate If Black >90 >60 mL/min/[1.73_m2]    Calcium 8.3 (L) 8.5 - 10.1 mg/dL    Bilirubin Total 0.2 0.2 - 1.3 mg/dL    Albumin 3.6 3.4 - 5.0 g/dL    Protein Total 7.3  6.8 - 8.8 g/dL    Alkaline Phosphatase 101 40 - 150 U/L     (H) 0 - 50 U/L    AST 75 (H) 0 - 45 U/L   Troponin I   Result Value Ref Range    Troponin I ES <0.015 0.000 - 0.045 ug/L   Alcohol ethyl   Result Value Ref Range    Ethanol g/dL 0.31 (H) 0.01 g/dL   Magnesium   Result Value Ref Range    Magnesium 2.2 1.6 - 2.3 mg/dL   Cervical spine CT w/o contrast    Narrative    PROCEDURE: CT CERVICAL SPINE W/O CONTRAST 5/16/2020 12:34 AM    HISTORY: Neck pain, chronic, abn neuro exam, neg xray; fall etoh has  been having pain using tiger balm    COMPARISONS: March 8, 2019    Meds/Dose Given:    TECHNIQUE: CT scan of cervical spine with sagittal coronal  reconstructions    FINDINGS: Arthritic changes are seen at the middle atlantoaxial joint.  The C2-C3 disc is normal in height. There is mild anterior osteophytes  at C3-C4. There has been fusion of C4-C5 and C6 fixed with anterior  plate and screws. There is decrease in height in the C6-C7 disc with  mild forward subluxation of C6 on C7. Cervical facet joint  degenerative changes are noted worse on the right than the left. There  are no fractures of the vertebral bodies or arches. The prevertebral  soft tissues appear normal.         Impression    IMPRESSION: No acute cervical injury    KONSTANTIN CASTILLO MD   XR Knee Right 1/2 Views    Narrative    PROCEDURE:  XR KNEE RT 1 /2 VW    HISTORY: fall    COMPARISON:  None.    TECHNIQUE:  2 views of the right knee were obtained.    FINDINGS:  No fracture or dislocation is identified. The joint spaces  are preserved. There is no knee effusion       Impression    IMPRESSION: No acute fracture.      KONSTANTIN CASTILLO MD   XR Shoulder Right G/E 3 Views    Narrative    PROCEDURE:  XR SHOULDER RT G/E 3 VW    HISTORY: pain in the shoulder    COMPARISON:  None.    TECHNIQUE:  4 views of the right shoulder were obtained.    FINDINGS:  No fracture or dislocation is identified. The joint spaces  are preserved.        Impression     IMPRESSION: Normal right shoulder      KONSTANTIN CASTILLO MD   Troponin I (second draw)   Result Value Ref Range    Troponin I ES <0.015 0.000 - 0.045 ug/L   Head CT w/o contrast    Narrative    PROCEDURE: CT HEAD W/O CONTRAST     HISTORY: Altered level of consciousness (LOC), unexplained; history of  falls, subdural rule out, prior visit for same.    COMPARISON: 2013    TECHNIQUE:  Helical images of the head from the foramen magnum to the  vertex were obtained without contrast.    FINDINGS: The ventricles and sulci are normal in volume. No acute  intracranial hemorrhage, mass effect, midline shift, hydrocephalus or  basilar cystern effacement are present.    The grey-white matter interface is preserved.    The calvarium is intact. The mastoid air cells are clear.  The  visualized paranasal sinuses are clear.      Impression    IMPRESSION: Normal brain for age      KONSTANTIN CASTILLO MD   Alcohol ethyl   Result Value Ref Range    Ethanol g/dL 0.10 (H) 0.01 g/dL       Medications   thiamine (B-1) 100 MG/ML injection (  Not Given 5/16/20 0057)   sodium chloride 0.9 % infusion (  Not Given 5/16/20 0056)   ketorolac (TORADOL) injection 30 mg (30 mg Intramuscular Not Given 5/16/20 1002)   thiamine (B-1) 200 mg in sodium chloride 0.9 % 50 mL intermittent infusion (0 mg Intravenous Stopped 5/16/20 0133)       Assessments & Plan (with Medical Decision Making)     I have reviewed the nursing notes.    I have reviewed the findings, diagnosis, plan and need for follow up with the patient.  ekg [id number]  SINUS  @ 89 BPM IACD , LAD 39 DEGREE NEG AXIS . NO CHANGE EXCEPT RATE FROM EKG DONE 10/ 15 2018    Pt is on oxygen at home. Told RN that it is to relax her. She does still smoke.     Pt given,   New Prescriptions    No medications on file     Third EKG done at 025 shows sinus at 98 borderline tachy with LBBB LAD,     On observing the pt.   Final diagnoses:   Alcohol intoxication, with unspecified complication (H)   Sprain of medial  collateral ligament of right knee, initial encounter   Left bundle branch block (LBBB) determined by electrocardiography   Personal history of fall   Transaminitis   5/15/2020      films negative. PT given food and counselled about her ETOH level and does not have interest in detox.  Was told about the LBBB and I can get her eval per cardiology if desired.    Will hold her here till clinically sober completely and able to ambulate with the knee immobilizer.     Pt states she has braces at home  She was incontinent of large volume of urine    Will check CT for occult SDH since she has had two visits here for etoh intox and falling    Social service eval pending and Ct as well    Full plan on eval of her ability to take care of self at home and CT    Signed out to Dr Winslow for disposition    Patient handed over from Dr. Lara at shift change at 8 AM, please see her note.  Diagnosis: Alcohol intoxication.  Patient presented to the ED for the second time within the last 1 week with alcohol intoxication and feeling weak.  She also apparently fell with her knee giving way.  Plain films of the knee were negative for any acute fractures.  Patient monitored at the ED until 9 AM 1 repeat alcohol level was 0.1 g/dL.  Patient uses a walker at home and feels stable enough to be discharged home to use it at home.  A family member will meet the patient at home.   have been consulted and will follow up with patient as outpatient.  Discharged home stable condition.    1. Alcohol intoxication, with unspecified complication (H)    2. Sprain of medial collateral ligament of right knee, initial encounter    3. Left bundle branch block (LBBB) determined by electrocardiography    4. Personal history of fall    5. Transaminitis        [facility]     Lavell Winslow MD  05/16/20 1050

## 2020-05-16 NOTE — ED NOTES
Pt was walked back to the room with myself and nurse. Pt was also changed into scrubs and is resting in be with face mask on face.

## 2020-05-16 NOTE — ED NOTES
Patient noted to have some EKG changes on the monitor. Patient not having any pain, lead placement checked. Patient states that she had just fallen asleep. Reported to MD. New orders to repeat troponin and repeat EKG.

## 2020-10-20 NOTE — PROGRESS NOTES
HEARING AID CHECK    BACKGROUND:  Jimena Mensah, a 65 year old female, was seen today for an hearing aid check.  Ms. Mensah wears Right Unitron Temp[employer]ox[employer] Fit 700 hearing aid.  Ms. Mensah reported aid going in and out/weak.    FINDINGS:  Visual inspection and cleaning provided.   C-stop changed with new. Large closed domes changed with new. Battery was tested and good. Weak listening check.    Reviewed cleaning, troubleshooting, and preventative care with patient. Any cleaning tools used were provided as customer courtesy.    Services performed and warranty reviewed with patient.    PLAN:  Based on patient report, no audiology appointment for adjustments needed.Ms. Mensah right hearing aid sent out for repair under warranty. Patient had no further questions and reports satisfaction.         Alisa Dong  Audiology Assistant  [facility]  [phone]

## 2020-10-26 NOTE — PROGRESS NOTES
Background:  Received repaired Right Unitron T Moxi Fit 700 hearing aid.       Procedures Performed:  The  completed the following repairs: replaced electronics, housing,  and dome. Reprogrammed to user settings.    Follow up:   Ms. Mensah was called for hearing aid  at .  Return to clinic as needed.      Alisa Dong  Audiology Assistant  [facility]  [phone]

## 2021-01-01 ENCOUNTER — HOSPITAL ENCOUNTER (EMERGENCY)
Facility: HOSPITAL | Age: 66
Discharge: HOME | End: 2021-01-25
Attending: FAMILY MEDICINE | Admitting: FAMILY MEDICINE
Payer: COMMERCIAL

## 2021-01-01 ENCOUNTER — APPOINTMENT (OUTPATIENT)
Dept: CT IMAGING | Facility: HOSPITAL | Age: 66
DRG: 558 | End: 2021-01-01
Attending: EMERGENCY MEDICINE
Payer: COMMERCIAL

## 2021-01-01 ENCOUNTER — APPOINTMENT (OUTPATIENT)
Dept: PHYSICAL THERAPY | Facility: HOSPITAL | Age: 66
DRG: 558 | End: 2021-01-01
Payer: COMMERCIAL

## 2021-01-01 ENCOUNTER — APPOINTMENT (OUTPATIENT)
Dept: OCCUPATIONAL THERAPY | Facility: HOSPITAL | Age: 66
DRG: 558 | End: 2021-01-01
Payer: COMMERCIAL

## 2021-01-01 ENCOUNTER — ANESTHESIA (OUTPATIENT)
Dept: MEDSURG UNIT | Facility: HOSPITAL | Age: 66
DRG: 558 | End: 2021-01-01
Payer: COMMERCIAL

## 2021-01-01 ENCOUNTER — APPOINTMENT (OUTPATIENT)
Dept: GENERAL RADIOLOGY | Facility: HOSPITAL | Age: 66
DRG: 558 | End: 2021-01-01
Attending: EMERGENCY MEDICINE
Payer: COMMERCIAL

## 2021-01-01 ENCOUNTER — APPOINTMENT (OUTPATIENT)
Dept: OCCUPATIONAL THERAPY | Facility: HOSPITAL | Age: 66
DRG: 558 | End: 2021-01-01
Attending: INTERNAL MEDICINE
Payer: COMMERCIAL

## 2021-01-01 ENCOUNTER — ANESTHESIA EVENT (OUTPATIENT)
Dept: MEDSURG UNIT | Facility: HOSPITAL | Age: 66
DRG: 558 | End: 2021-01-01
Payer: COMMERCIAL

## 2021-01-01 ENCOUNTER — HOSPITAL ENCOUNTER (INPATIENT)
Facility: HOSPITAL | Age: 66
LOS: 4 days | Discharge: HOME | DRG: 558 | End: 2021-01-23
Attending: EMERGENCY MEDICINE | Admitting: INTERNAL MEDICINE
Payer: COMMERCIAL

## 2021-01-01 VITALS
HEIGHT: 63 IN | BODY MASS INDEX: 40.23 KG/M2 | SYSTOLIC BLOOD PRESSURE: 184 MMHG | RESPIRATION RATE: 16 BRPM | OXYGEN SATURATION: 93 % | HEART RATE: 62 BPM | WEIGHT: 227.07 LBS | DIASTOLIC BLOOD PRESSURE: 98 MMHG | TEMPERATURE: 99 F

## 2021-01-01 VITALS
BODY MASS INDEX: 32.63 KG/M2 | RESPIRATION RATE: 16 BRPM | SYSTOLIC BLOOD PRESSURE: 139 MMHG | OXYGEN SATURATION: 96 % | HEIGHT: 68 IN | DIASTOLIC BLOOD PRESSURE: 74 MMHG | WEIGHT: 215.3 LBS | TEMPERATURE: 97.7 F | HEART RATE: 65 BPM

## 2021-01-01 DIAGNOSIS — F10.10 ALCOHOL ABUSE: ICD-10-CM

## 2021-01-01 DIAGNOSIS — R21 RASH: ICD-10-CM

## 2021-01-01 DIAGNOSIS — R29.898 MUSCULAR DECONDITIONING: Primary | ICD-10-CM

## 2021-01-01 DIAGNOSIS — D72.829 LEUKOCYTOSIS, UNSPECIFIED TYPE: ICD-10-CM

## 2021-01-01 DIAGNOSIS — R26.89 BALANCE DISORDER: Primary | ICD-10-CM

## 2021-01-01 DIAGNOSIS — E87.1 HYPONATREMIA: ICD-10-CM

## 2021-01-01 DIAGNOSIS — M62.82 NON-TRAUMATIC RHABDOMYOLYSIS: ICD-10-CM

## 2021-01-01 DIAGNOSIS — F10.29 ALCOHOL DEPENDENCE WITH UNSPECIFIED ALCOHOL-INDUCED DISORDER (H): ICD-10-CM

## 2021-01-01 LAB
ALBUMIN SERPL-MCNC: 2.3 G/DL (ref 3.4–5)
ALBUMIN SERPL-MCNC: 2.6 G/DL (ref 3.4–5)
ALBUMIN SERPL-MCNC: 2.7 G/DL (ref 3.4–5)
ALBUMIN SERPL-MCNC: 2.7 G/DL (ref 3.4–5)
ALBUMIN SERPL-MCNC: 2.8 G/DL (ref 3.4–5)
ALBUMIN SERPL-MCNC: 4 G/DL (ref 3.4–5)
ALBUMIN UR-MCNC: 10 MG/DL
ALBUMIN UR-MCNC: NEGATIVE MG/DL
ALP SERPL-CCNC: 107 U/L (ref 40–150)
ALP SERPL-CCNC: 111 U/L (ref 40–150)
ALP SERPL-CCNC: 133 U/L (ref 40–150)
ALP SERPL-CCNC: 144 U/L (ref 40–150)
ALP SERPL-CCNC: 203 U/L (ref 40–150)
ALP SERPL-CCNC: 241 U/L (ref 40–150)
ALT SERPL W P-5'-P-CCNC: 126 U/L (ref 0–50)
ALT SERPL W P-5'-P-CCNC: 143 U/L (ref 0–50)
ALT SERPL W P-5'-P-CCNC: 185 U/L (ref 0–50)
ALT SERPL W P-5'-P-CCNC: 276 U/L (ref 0–50)
ALT SERPL W P-5'-P-CCNC: 62 U/L (ref 0–50)
ALT SERPL W P-5'-P-CCNC: 64 U/L (ref 0–50)
AMMONIA PLAS-SCNC: 13 UMOL/L (ref 10–50)
ANION GAP SERPL CALCULATED.3IONS-SCNC: 12 MMOL/L (ref 3–14)
ANION GAP SERPL CALCULATED.3IONS-SCNC: 14 MMOL/L (ref 3–14)
ANION GAP SERPL CALCULATED.3IONS-SCNC: 3 MMOL/L (ref 3–14)
ANION GAP SERPL CALCULATED.3IONS-SCNC: 4 MMOL/L (ref 3–14)
ANION GAP SERPL CALCULATED.3IONS-SCNC: 5 MMOL/L (ref 3–14)
ANION GAP SERPL CALCULATED.3IONS-SCNC: 5 MMOL/L (ref 3–14)
ANION GAP SERPL CALCULATED.3IONS-SCNC: 7 MMOL/L (ref 3–14)
APPEARANCE UR: CLEAR
APPEARANCE UR: CLEAR
AST SERPL W P-5'-P-CCNC: 110 U/L (ref 0–45)
AST SERPL W P-5'-P-CCNC: 137 U/L (ref 0–45)
AST SERPL W P-5'-P-CCNC: 141 U/L (ref 0–45)
AST SERPL W P-5'-P-CCNC: 181 U/L (ref 0–45)
AST SERPL W P-5'-P-CCNC: 212 U/L (ref 0–45)
AST SERPL W P-5'-P-CCNC: 254 U/L (ref 0–45)
BACTERIA #/AREA URNS HPF: ABNORMAL /HPF
BASOPHILS # BLD AUTO: 0 10E9/L (ref 0–0.2)
BASOPHILS # BLD AUTO: 0.1 10E9/L (ref 0–0.2)
BASOPHILS NFR BLD AUTO: 0 %
BASOPHILS NFR BLD AUTO: 0.6 %
BILIRUB DIRECT SERPL-MCNC: 0.4 MG/DL (ref 0–0.2)
BILIRUB SERPL-MCNC: 0.4 MG/DL (ref 0.2–1.3)
BILIRUB SERPL-MCNC: 0.5 MG/DL (ref 0.2–1.3)
BILIRUB SERPL-MCNC: 0.6 MG/DL (ref 0.2–1.3)
BILIRUB SERPL-MCNC: 0.6 MG/DL (ref 0.2–1.3)
BILIRUB SERPL-MCNC: 0.8 MG/DL (ref 0.2–1.3)
BILIRUB SERPL-MCNC: 1.1 MG/DL (ref 0.2–1.3)
BILIRUB UR QL STRIP: NEGATIVE
BILIRUB UR QL STRIP: NEGATIVE
BUN SERPL-MCNC: 10 MG/DL (ref 7–30)
BUN SERPL-MCNC: 14 MG/DL (ref 7–30)
BUN SERPL-MCNC: 15 MG/DL (ref 7–30)
BUN SERPL-MCNC: 16 MG/DL (ref 7–30)
BUN SERPL-MCNC: 19 MG/DL (ref 7–30)
BUN SERPL-MCNC: 19 MG/DL (ref 7–30)
BUN SERPL-MCNC: 9 MG/DL (ref 7–30)
CALCIUM SERPL-MCNC: 8.1 MG/DL (ref 8.5–10.1)
CALCIUM SERPL-MCNC: 8.2 MG/DL (ref 8.5–10.1)
CALCIUM SERPL-MCNC: 8.3 MG/DL (ref 8.5–10.1)
CALCIUM SERPL-MCNC: 8.4 MG/DL (ref 8.5–10.1)
CALCIUM SERPL-MCNC: 8.9 MG/DL (ref 8.5–10.1)
CALCIUM SERPL-MCNC: 9.2 MG/DL (ref 8.5–10.1)
CALCIUM SERPL-MCNC: 9.4 MG/DL (ref 8.5–10.1)
CHLORIDE SERPL-SCNC: 103 MMOL/L (ref 94–109)
CHLORIDE SERPL-SCNC: 103 MMOL/L (ref 94–109)
CHLORIDE SERPL-SCNC: 104 MMOL/L (ref 94–109)
CHLORIDE SERPL-SCNC: 106 MMOL/L (ref 94–109)
CHLORIDE SERPL-SCNC: 111 MMOL/L (ref 94–109)
CHLORIDE SERPL-SCNC: 87 MMOL/L (ref 94–109)
CHLORIDE SERPL-SCNC: 93 MMOL/L (ref 94–109)
CK SERPL-CCNC: 213 U/L (ref 30–225)
CK SERPL-CCNC: 3005 U/L (ref 30–225)
CK SERPL-CCNC: 3051 U/L (ref 30–225)
CK SERPL-CCNC: 4881 U/L (ref 30–225)
CO2 SERPL-SCNC: 22 MMOL/L (ref 20–32)
CO2 SERPL-SCNC: 23 MMOL/L (ref 20–32)
CO2 SERPL-SCNC: 25 MMOL/L (ref 20–32)
CO2 SERPL-SCNC: 27 MMOL/L (ref 20–32)
CO2 SERPL-SCNC: 28 MMOL/L (ref 20–32)
CO2 SERPL-SCNC: 28 MMOL/L (ref 20–32)
CO2 SERPL-SCNC: 31 MMOL/L (ref 20–32)
COLOR UR AUTO: ABNORMAL
COLOR UR AUTO: NORMAL
CREAT SERPL-MCNC: 0.5 MG/DL (ref 0.52–1.04)
CREAT SERPL-MCNC: 0.53 MG/DL (ref 0.52–1.04)
CREAT SERPL-MCNC: 0.53 MG/DL (ref 0.52–1.04)
CREAT SERPL-MCNC: 0.58 MG/DL (ref 0.52–1.04)
CREAT SERPL-MCNC: 0.59 MG/DL (ref 0.52–1.04)
CREAT SERPL-MCNC: 0.62 MG/DL (ref 0.52–1.04)
CREAT SERPL-MCNC: 0.71 MG/DL (ref 0.52–1.04)
CRP SERPL-MCNC: 28.2 MG/L (ref 0–8)
DIFFERENTIAL METHOD BLD: ABNORMAL
DIFFERENTIAL METHOD BLD: NORMAL
EOSINOPHIL # BLD AUTO: 0 10E9/L (ref 0–0.7)
EOSINOPHIL # BLD AUTO: 0.2 10E9/L (ref 0–0.7)
EOSINOPHIL NFR BLD AUTO: 0 %
EOSINOPHIL NFR BLD AUTO: 2.9 %
ERYTHROCYTE [DISTWIDTH] IN BLOOD BY AUTOMATED COUNT: 13.7 % (ref 10–15)
ERYTHROCYTE [DISTWIDTH] IN BLOOD BY AUTOMATED COUNT: 13.7 % (ref 10–15)
ERYTHROCYTE [DISTWIDTH] IN BLOOD BY AUTOMATED COUNT: 14.4 % (ref 10–15)
ERYTHROCYTE [DISTWIDTH] IN BLOOD BY AUTOMATED COUNT: 14.9 % (ref 10–15)
ETHANOL SERPL-MCNC: 0.3 G/DL
ETHANOL SERPL-MCNC: <0.01 G/DL
GFR SERPL CREATININE-BSD FRML MDRD: 88 ML/MIN/{1.73_M2}
GFR SERPL CREATININE-BSD FRML MDRD: >90 ML/MIN/{1.73_M2}
GLUCOSE SERPL-MCNC: 106 MG/DL (ref 70–99)
GLUCOSE SERPL-MCNC: 176 MG/DL (ref 70–99)
GLUCOSE SERPL-MCNC: 191 MG/DL (ref 70–99)
GLUCOSE SERPL-MCNC: 70 MG/DL (ref 70–99)
GLUCOSE SERPL-MCNC: 86 MG/DL (ref 70–99)
GLUCOSE SERPL-MCNC: 87 MG/DL (ref 70–99)
GLUCOSE SERPL-MCNC: 90 MG/DL (ref 70–99)
GLUCOSE UR STRIP-MCNC: NEGATIVE MG/DL
GLUCOSE UR STRIP-MCNC: NEGATIVE MG/DL
HCT VFR BLD AUTO: 36.6 % (ref 35–47)
HCT VFR BLD AUTO: 37 % (ref 35–47)
HCT VFR BLD AUTO: 37.4 % (ref 35–47)
HCT VFR BLD AUTO: 37.5 % (ref 35–47)
HCT VFR BLD AUTO: 39.7 % (ref 35–47)
HCT VFR BLD AUTO: 40.2 % (ref 35–47)
HCT VFR BLD AUTO: 45.2 % (ref 35–47)
HGB BLD-MCNC: 12.2 G/DL (ref 11.7–15.7)
HGB BLD-MCNC: 12.4 G/DL (ref 11.7–15.7)
HGB BLD-MCNC: 12.7 G/DL (ref 11.7–15.7)
HGB BLD-MCNC: 12.8 G/DL (ref 11.7–15.7)
HGB BLD-MCNC: 13.2 G/DL (ref 11.7–15.7)
HGB BLD-MCNC: 14.4 G/DL (ref 11.7–15.7)
HGB BLD-MCNC: 15.9 G/DL (ref 11.7–15.7)
HGB UR QL STRIP: ABNORMAL
HGB UR QL STRIP: NEGATIVE
HYALINE CASTS #/AREA URNS LPF: 5 /LPF
IMM GRANULOCYTES # BLD: 0.1 10E9/L (ref 0–0.4)
IMM GRANULOCYTES NFR BLD: 0.9 %
INR PPP: 0.93 (ref 0.86–1.14)
KETONES UR STRIP-MCNC: 10 MG/DL
KETONES UR STRIP-MCNC: NEGATIVE MG/DL
LABORATORY COMMENT REPORT: NORMAL
LEUKOCYTE ESTERASE UR QL STRIP: NEGATIVE
LEUKOCYTE ESTERASE UR QL STRIP: NEGATIVE
LYMPHOCYTES # BLD AUTO: 1.2 10E9/L (ref 0.8–5.3)
LYMPHOCYTES # BLD AUTO: 2.6 10E9/L (ref 0.8–5.3)
LYMPHOCYTES NFR BLD AUTO: 32 %
LYMPHOCYTES NFR BLD AUTO: 8 %
MCH RBC QN AUTO: 30.4 PG (ref 26.5–33)
MCH RBC QN AUTO: 30.6 PG (ref 26.5–33)
MCH RBC QN AUTO: 30.9 PG (ref 26.5–33)
MCH RBC QN AUTO: 30.9 PG (ref 26.5–33)
MCH RBC QN AUTO: 31 PG (ref 26.5–33)
MCH RBC QN AUTO: 31 PG (ref 26.5–33)
MCH RBC QN AUTO: 31.1 PG (ref 26.5–33)
MCHC RBC AUTO-ENTMCNC: 33.2 G/DL (ref 31.5–36.5)
MCHC RBC AUTO-ENTMCNC: 33.2 G/DL (ref 31.5–36.5)
MCHC RBC AUTO-ENTMCNC: 33.3 G/DL (ref 31.5–36.5)
MCHC RBC AUTO-ENTMCNC: 33.9 G/DL (ref 31.5–36.5)
MCHC RBC AUTO-ENTMCNC: 34.6 G/DL (ref 31.5–36.5)
MCHC RBC AUTO-ENTMCNC: 35.2 G/DL (ref 31.5–36.5)
MCHC RBC AUTO-ENTMCNC: 35.8 G/DL (ref 31.5–36.5)
MCV RBC AUTO: 86 FL (ref 78–100)
MCV RBC AUTO: 86 FL (ref 78–100)
MCV RBC AUTO: 90 FL (ref 78–100)
MCV RBC AUTO: 92 FL (ref 78–100)
MCV RBC AUTO: 92 FL (ref 78–100)
MCV RBC AUTO: 93 FL (ref 78–100)
MCV RBC AUTO: 93 FL (ref 78–100)
MONOCYTES # BLD AUTO: 0.8 10E9/L (ref 0–1.3)
MONOCYTES # BLD AUTO: 1.2 10E9/L (ref 0–1.3)
MONOCYTES NFR BLD AUTO: 14.8 %
MONOCYTES NFR BLD AUTO: 5 %
MUCOUS THREADS #/AREA URNS LPF: PRESENT /LPF
NEUTROPHILS # BLD AUTO: 13.2 10E9/L (ref 1.6–8.3)
NEUTROPHILS # BLD AUTO: 3.9 10E9/L (ref 1.6–8.3)
NEUTROPHILS NFR BLD AUTO: 48.8 %
NEUTROPHILS NFR BLD AUTO: 87 %
NITRATE UR QL: NEGATIVE
NITRATE UR QL: NEGATIVE
NRBC # BLD AUTO: 0 10*3/UL
NRBC BLD AUTO-RTO: 0 /100
NT-PROBNP SERPL-MCNC: 3308 PG/ML (ref 0–900)
PH UR STRIP: 5.5 PH (ref 4.7–8)
PH UR STRIP: 7 PH (ref 4.7–8)
PLATELET # BLD AUTO: 130 10E9/L (ref 150–450)
PLATELET # BLD AUTO: 152 10E9/L (ref 150–450)
PLATELET # BLD AUTO: 153 10E9/L (ref 150–450)
PLATELET # BLD AUTO: 170 10E9/L (ref 150–450)
PLATELET # BLD AUTO: 197 10E9/L (ref 150–450)
PLATELET # BLD AUTO: 224 10E9/L (ref 150–450)
PLATELET # BLD AUTO: 243 10E9/L (ref 150–450)
PLATELET # BLD EST: ABNORMAL 10*3/UL
POTASSIUM SERPL-SCNC: 3.6 MMOL/L (ref 3.4–5.3)
POTASSIUM SERPL-SCNC: 3.6 MMOL/L (ref 3.4–5.3)
POTASSIUM SERPL-SCNC: 3.8 MMOL/L (ref 3.4–5.3)
POTASSIUM SERPL-SCNC: 3.8 MMOL/L (ref 3.4–5.3)
POTASSIUM SERPL-SCNC: 3.9 MMOL/L (ref 3.4–5.3)
POTASSIUM SERPL-SCNC: 4.6 MMOL/L (ref 3.4–5.3)
POTASSIUM SERPL-SCNC: 4.7 MMOL/L (ref 3.4–5.3)
PROCALCITONIN SERPL-MCNC: <0.05 NG/ML
PROT SERPL-MCNC: 5.5 G/DL (ref 6.8–8.8)
PROT SERPL-MCNC: 5.6 G/DL (ref 6.8–8.8)
PROT SERPL-MCNC: 5.8 G/DL (ref 6.8–8.8)
PROT SERPL-MCNC: 6.3 G/DL (ref 6.8–8.8)
PROT SERPL-MCNC: 6.4 G/DL (ref 6.8–8.8)
PROT SERPL-MCNC: 7.8 G/DL (ref 6.8–8.8)
RBC # BLD AUTO: 3.99 10E12/L (ref 3.8–5.2)
RBC # BLD AUTO: 4.01 10E12/L (ref 3.8–5.2)
RBC # BLD AUTO: 4.1 10E12/L (ref 3.8–5.2)
RBC # BLD AUTO: 4.12 10E12/L (ref 3.8–5.2)
RBC # BLD AUTO: 4.26 10E12/L (ref 3.8–5.2)
RBC # BLD AUTO: 4.66 10E12/L (ref 3.8–5.2)
RBC # BLD AUTO: 5.23 10E12/L (ref 3.8–5.2)
RBC #/AREA URNS AUTO: 0 /HPF (ref 0–2)
RBC MORPH BLD: ABNORMAL
SARS-COV-2 RNA RESP QL NAA+PROBE: NEGATIVE
SODIUM SERPL-SCNC: 123 MMOL/L (ref 133–144)
SODIUM SERPL-SCNC: 128 MMOL/L (ref 133–144)
SODIUM SERPL-SCNC: 129 MMOL/L (ref 133–144)
SODIUM SERPL-SCNC: 135 MMOL/L (ref 133–144)
SODIUM SERPL-SCNC: 137 MMOL/L (ref 133–144)
SODIUM SERPL-SCNC: 138 MMOL/L (ref 133–144)
SODIUM SERPL-SCNC: 139 MMOL/L (ref 133–144)
SODIUM SERPL-SCNC: 141 MMOL/L (ref 133–144)
SOURCE: ABNORMAL
SOURCE: NORMAL
SP GR UR STRIP: 1 (ref 1–1.03)
SP GR UR STRIP: 1.01 (ref 1–1.03)
SPECIMEN SOURCE: NORMAL
SQUAMOUS #/AREA URNS AUTO: 2 /HPF (ref 0–1)
TROPONIN I SERPL-MCNC: 0.03 UG/L (ref 0–0.04)
TROPONIN I SERPL-MCNC: 0.03 UG/L (ref 0–0.04)
TROPONIN I SERPL-MCNC: 0.04 UG/L (ref 0–0.04)
TSH SERPL DL<=0.005 MIU/L-ACNC: 2.72 MU/L (ref 0.4–4)
UROBILINOGEN UR STRIP-MCNC: NORMAL MG/DL (ref 0–2)
UROBILINOGEN UR STRIP-MCNC: NORMAL MG/DL (ref 0–2)
WBC # BLD AUTO: 11.8 10E9/L (ref 4–11)
WBC # BLD AUTO: 15.2 10E9/L (ref 4–11)
WBC # BLD AUTO: 6.5 10E9/L (ref 4–11)
WBC # BLD AUTO: 7.6 10E9/L (ref 4–11)
WBC # BLD AUTO: 8 10E9/L (ref 4–11)
WBC # BLD AUTO: 8 10E9/L (ref 4–11)
WBC # BLD AUTO: 8.7 10E9/L (ref 4–11)
WBC #/AREA URNS AUTO: <1 /HPF (ref 0–5)

## 2021-01-01 PROCEDURE — 83880 ASSAY OF NATRIURETIC PEPTIDE: CPT | Mod: GZ | Performed by: FAMILY MEDICINE

## 2021-01-01 PROCEDURE — 97162 PT EVAL MOD COMPLEX 30 MIN: CPT | Mod: GP | Performed by: PHYSICAL THERAPIST

## 2021-01-01 PROCEDURE — 80048 BASIC METABOLIC PNL TOTAL CA: CPT | Performed by: EMERGENCY MEDICINE

## 2021-01-01 PROCEDURE — 999N000157 HC STATISTIC RCP TIME EA 10 MIN

## 2021-01-01 PROCEDURE — 36415 COLL VENOUS BLD VENIPUNCTURE: CPT | Performed by: INTERNAL MEDICINE

## 2021-01-01 PROCEDURE — 258N000003 HC RX IP 258 OP 636: Performed by: INTERNAL MEDICINE

## 2021-01-01 PROCEDURE — 85027 COMPLETE CBC AUTOMATED: CPT | Performed by: INTERNAL MEDICINE

## 2021-01-01 PROCEDURE — U0003 INFECTIOUS AGENT DETECTION BY NUCLEIC ACID (DNA OR RNA); SEVERE ACUTE RESPIRATORY SYNDROME CORONAVIRUS 2 (SARS-COV-2) (CORONAVIRUS DISEASE [COVID-19]), AMPLIFIED PROBE TECHNIQUE, MAKING USE OF HIGH THROUGHPUT TECHNOLOGIES AS DESCRIBED BY CMS-2020-01-R: HCPCS | Performed by: EMERGENCY MEDICINE

## 2021-01-01 PROCEDURE — 250N000013 HC RX MED GY IP 250 OP 250 PS 637: Performed by: INTERNAL MEDICINE

## 2021-01-01 PROCEDURE — C9803 HOPD COVID-19 SPEC COLLECT: HCPCS

## 2021-01-01 PROCEDURE — 120N000001 HC R&B MED SURG/OB

## 2021-01-01 PROCEDURE — 99285 EMERGENCY DEPT VISIT HI MDM: CPT | Mod: 25

## 2021-01-01 PROCEDURE — 80053 COMPREHEN METABOLIC PANEL: CPT | Performed by: INTERNAL MEDICINE

## 2021-01-01 PROCEDURE — 97530 THERAPEUTIC ACTIVITIES: CPT | Mod: GO

## 2021-01-01 PROCEDURE — 250N000011 HC RX IP 250 OP 636: Performed by: INTERNAL MEDICINE

## 2021-01-01 PROCEDURE — 93010 ELECTROCARDIOGRAM REPORT: CPT | Performed by: INTERNAL MEDICINE

## 2021-01-01 PROCEDURE — 82550 ASSAY OF CK (CPK): CPT | Performed by: EMERGENCY MEDICINE

## 2021-01-01 PROCEDURE — 80076 HEPATIC FUNCTION PANEL: CPT | Performed by: EMERGENCY MEDICINE

## 2021-01-01 PROCEDURE — U0005 INFEC AGEN DETEC AMPLI PROBE: HCPCS | Performed by: EMERGENCY MEDICINE

## 2021-01-01 PROCEDURE — 97530 THERAPEUTIC ACTIVITIES: CPT | Mod: GP

## 2021-01-01 PROCEDURE — 80048 BASIC METABOLIC PNL TOTAL CA: CPT | Performed by: INTERNAL MEDICINE

## 2021-01-01 PROCEDURE — 82550 ASSAY OF CK (CPK): CPT | Performed by: FAMILY MEDICINE

## 2021-01-01 PROCEDURE — 81001 URINALYSIS AUTO W/SCOPE: CPT | Performed by: EMERGENCY MEDICINE

## 2021-01-01 PROCEDURE — 84443 ASSAY THYROID STIM HORMONE: CPT | Performed by: EMERGENCY MEDICINE

## 2021-01-01 PROCEDURE — 84484 ASSAY OF TROPONIN QUANT: CPT | Performed by: INTERNAL MEDICINE

## 2021-01-01 PROCEDURE — 85025 COMPLETE CBC W/AUTO DIFF WBC: CPT | Performed by: EMERGENCY MEDICINE

## 2021-01-01 PROCEDURE — 94640 AIRWAY INHALATION TREATMENT: CPT

## 2021-01-01 PROCEDURE — 97166 OT EVAL MOD COMPLEX 45 MIN: CPT | Mod: GO

## 2021-01-01 PROCEDURE — 82550 ASSAY OF CK (CPK): CPT | Performed by: INTERNAL MEDICINE

## 2021-01-01 PROCEDURE — 82077 ASSAY SPEC XCP UR&BREATH IA: CPT | Performed by: FAMILY MEDICINE

## 2021-01-01 PROCEDURE — 93005 ELECTROCARDIOGRAM TRACING: CPT

## 2021-01-01 PROCEDURE — 82140 ASSAY OF AMMONIA: CPT | Performed by: EMERGENCY MEDICINE

## 2021-01-01 PROCEDURE — 80053 COMPREHEN METABOLIC PANEL: CPT | Performed by: FAMILY MEDICINE

## 2021-01-01 PROCEDURE — 99284 EMERGENCY DEPT VISIT MOD MDM: CPT | Performed by: FAMILY MEDICINE

## 2021-01-01 PROCEDURE — 99232 SBSQ HOSP IP/OBS MODERATE 35: CPT | Performed by: INTERNAL MEDICINE

## 2021-01-01 PROCEDURE — 82077 ASSAY SPEC XCP UR&BREATH IA: CPT | Performed by: EMERGENCY MEDICINE

## 2021-01-01 PROCEDURE — 73502 X-RAY EXAM HIP UNI 2-3 VIEWS: CPT

## 2021-01-01 PROCEDURE — 85610 PROTHROMBIN TIME: CPT | Performed by: EMERGENCY MEDICINE

## 2021-01-01 PROCEDURE — 84484 ASSAY OF TROPONIN QUANT: CPT | Performed by: EMERGENCY MEDICINE

## 2021-01-01 PROCEDURE — 99239 HOSP IP/OBS DSCHRG MGMT >30: CPT | Performed by: INTERNAL MEDICINE

## 2021-01-01 PROCEDURE — 86140 C-REACTIVE PROTEIN: CPT | Performed by: FAMILY MEDICINE

## 2021-01-01 PROCEDURE — 258N000003 HC RX IP 258 OP 636: Performed by: EMERGENCY MEDICINE

## 2021-01-01 PROCEDURE — 99285 EMERGENCY DEPT VISIT HI MDM: CPT | Performed by: EMERGENCY MEDICINE

## 2021-01-01 PROCEDURE — 37000011 ZZH ANESTHESIA WARD SERVICE: Performed by: NURSE ANESTHETIST, CERTIFIED REGISTERED

## 2021-01-01 PROCEDURE — 84484 ASSAY OF TROPONIN QUANT: CPT | Performed by: FAMILY MEDICINE

## 2021-01-01 PROCEDURE — HZ2ZZZZ DETOXIFICATION SERVICES FOR SUBSTANCE ABUSE TREATMENT: ICD-10-PCS | Performed by: INTERNAL MEDICINE

## 2021-01-01 PROCEDURE — 72192 CT PELVIS W/O DYE: CPT

## 2021-01-01 PROCEDURE — 250N000013 HC RX MED GY IP 250 OP 250 PS 637: Performed by: EMERGENCY MEDICINE

## 2021-01-01 PROCEDURE — 36415 COLL VENOUS BLD VENIPUNCTURE: CPT | Performed by: FAMILY MEDICINE

## 2021-01-01 PROCEDURE — 84295 ASSAY OF SERUM SODIUM: CPT | Performed by: INTERNAL MEDICINE

## 2021-01-01 PROCEDURE — 84145 PROCALCITONIN (PCT): CPT | Performed by: FAMILY MEDICINE

## 2021-01-01 PROCEDURE — 36415 COLL VENOUS BLD VENIPUNCTURE: CPT | Performed by: EMERGENCY MEDICINE

## 2021-01-01 PROCEDURE — 99222 1ST HOSP IP/OBS MODERATE 55: CPT | Mod: AI | Performed by: INTERNAL MEDICINE

## 2021-01-01 PROCEDURE — 99284 EMERGENCY DEPT VISIT MOD MDM: CPT

## 2021-01-01 PROCEDURE — 99233 SBSQ HOSP IP/OBS HIGH 50: CPT | Performed by: INTERNAL MEDICINE

## 2021-01-01 PROCEDURE — 71045 X-RAY EXAM CHEST 1 VIEW: CPT

## 2021-01-01 PROCEDURE — 81003 URINALYSIS AUTO W/O SCOPE: CPT | Performed by: FAMILY MEDICINE

## 2021-01-01 PROCEDURE — 85025 COMPLETE CBC W/AUTO DIFF WBC: CPT | Performed by: FAMILY MEDICINE

## 2021-01-01 RX ORDER — AMOXICILLIN 250 MG
2 CAPSULE ORAL 2 TIMES DAILY PRN
Status: DISCONTINUED | OUTPATIENT
Start: 2021-01-01 | End: 2021-01-01 | Stop reason: HOSPADM

## 2021-01-01 RX ORDER — MULTIPLE VITAMINS W/ MINERALS TAB 9MG-400MCG
1 TAB ORAL DAILY
Status: DISCONTINUED | OUTPATIENT
Start: 2021-01-01 | End: 2021-01-01 | Stop reason: HOSPADM

## 2021-01-01 RX ORDER — AMOXICILLIN 250 MG
1 CAPSULE ORAL 2 TIMES DAILY PRN
Status: DISCONTINUED | OUTPATIENT
Start: 2021-01-01 | End: 2021-01-01 | Stop reason: HOSPADM

## 2021-01-01 RX ORDER — PROCHLORPERAZINE 25 MG
12.5 SUPPOSITORY, RECTAL RECTAL EVERY 12 HOURS PRN
Status: DISCONTINUED | OUTPATIENT
Start: 2021-01-01 | End: 2021-01-01 | Stop reason: HOSPADM

## 2021-01-01 RX ORDER — FLUTICASONE PROPIONATE 50 MCG
2 SPRAY, SUSPENSION (ML) NASAL DAILY
Status: DISCONTINUED | OUTPATIENT
Start: 2021-01-01 | End: 2021-01-01

## 2021-01-01 RX ORDER — PROCHLORPERAZINE MALEATE 5 MG
5 TABLET ORAL EVERY 6 HOURS PRN
Status: DISCONTINUED | OUTPATIENT
Start: 2021-01-01 | End: 2021-01-01 | Stop reason: HOSPADM

## 2021-01-01 RX ORDER — NYSTATIN 100000 U/G
CREAM TOPICAL 2 TIMES DAILY
COMMUNITY

## 2021-01-01 RX ORDER — DIAZEPAM 5 MG
5 TABLET ORAL 3 TIMES DAILY
Status: DISCONTINUED | OUTPATIENT
Start: 2021-01-01 | End: 2021-01-01

## 2021-01-01 RX ORDER — IPRATROPIUM BROMIDE AND ALBUTEROL SULFATE 2.5; .5 MG/3ML; MG/3ML
3 SOLUTION RESPIRATORY (INHALATION) EVERY 4 HOURS PRN
Status: DISCONTINUED | OUTPATIENT
Start: 2021-01-01 | End: 2021-01-01 | Stop reason: DRUGHIGH

## 2021-01-01 RX ORDER — LEVOTHYROXINE SODIUM 112 UG/1
112 TABLET ORAL
Status: DISCONTINUED | OUTPATIENT
Start: 2021-01-01 | End: 2021-01-01 | Stop reason: HOSPADM

## 2021-01-01 RX ORDER — SODIUM CHLORIDE 9 MG/ML
INJECTION, SOLUTION INTRAVENOUS CONTINUOUS
Status: DISCONTINUED | OUTPATIENT
Start: 2021-01-01 | End: 2021-01-01

## 2021-01-01 RX ORDER — FOLIC ACID 1 MG/1
1 TABLET ORAL DAILY
Status: DISCONTINUED | OUTPATIENT
Start: 2021-01-01 | End: 2021-01-01 | Stop reason: HOSPADM

## 2021-01-01 RX ORDER — CALCIUM POLYCARBOPHIL 625 MG/1
2 TABLET, FILM COATED ORAL DAILY
COMMUNITY
Start: 2020-01-01

## 2021-01-01 RX ORDER — DICLOFENAC SODIUM AND MISOPROSTOL 75; 200 MG/1; UG/1
1 TABLET, DELAYED RELEASE ORAL 2 TIMES DAILY
Status: DISCONTINUED | OUTPATIENT
Start: 2021-01-01 | End: 2021-01-01

## 2021-01-01 RX ORDER — ONDANSETRON 2 MG/ML
4 INJECTION INTRAMUSCULAR; INTRAVENOUS EVERY 6 HOURS PRN
Status: DISCONTINUED | OUTPATIENT
Start: 2021-01-01 | End: 2021-01-01 | Stop reason: HOSPADM

## 2021-01-01 RX ORDER — LOSARTAN POTASSIUM 25 MG/1
25 TABLET ORAL DAILY
Status: DISCONTINUED | OUTPATIENT
Start: 2021-01-01 | End: 2021-01-01 | Stop reason: HOSPADM

## 2021-01-01 RX ORDER — IPRATROPIUM BROMIDE AND ALBUTEROL SULFATE 2.5; .5 MG/3ML; MG/3ML
3 SOLUTION RESPIRATORY (INHALATION) EVERY 6 HOURS PRN
Status: DISCONTINUED | OUTPATIENT
Start: 2021-01-01 | End: 2021-01-01 | Stop reason: HOSPADM

## 2021-01-01 RX ORDER — OLOPATADINE HYDROCHLORIDE 1 MG/ML
1 SOLUTION/ DROPS OPHTHALMIC 2 TIMES DAILY
Status: DISCONTINUED | OUTPATIENT
Start: 2021-01-01 | End: 2021-01-01 | Stop reason: HOSPADM

## 2021-01-01 RX ORDER — MISOPROSTOL 200 UG/1
200 TABLET ORAL 2 TIMES DAILY
Status: DISCONTINUED | OUTPATIENT
Start: 2021-01-01 | End: 2021-01-01 | Stop reason: HOSPADM

## 2021-01-01 RX ORDER — CHOLECALCIFEROL (VITAMIN D3) 125 MCG
3000 CAPSULE ORAL PRN
COMMUNITY
Start: 2020-01-01

## 2021-01-01 RX ORDER — IPRATROPIUM BROMIDE AND ALBUTEROL SULFATE 2.5; .5 MG/3ML; MG/3ML
3 SOLUTION RESPIRATORY (INHALATION) EVERY 6 HOURS PRN
COMMUNITY
Start: 2020-01-01

## 2021-01-01 RX ORDER — ALBUTEROL SULFATE 90 UG/1
2 AEROSOL, METERED RESPIRATORY (INHALATION)
Status: DISCONTINUED | OUTPATIENT
Start: 2021-01-01 | End: 2021-01-01 | Stop reason: HOSPADM

## 2021-01-01 RX ORDER — DIAZEPAM 5 MG
5 TABLET ORAL 4 TIMES DAILY
Status: DISCONTINUED | OUTPATIENT
Start: 2021-01-01 | End: 2021-01-01

## 2021-01-01 RX ORDER — DICLOFENAC SODIUM 75 MG/1
75 TABLET, DELAYED RELEASE ORAL 2 TIMES DAILY
Status: DISCONTINUED | OUTPATIENT
Start: 2021-01-01 | End: 2021-01-01 | Stop reason: HOSPADM

## 2021-01-01 RX ORDER — CYCLOBENZAPRINE HCL 10 MG
10 TABLET ORAL
COMMUNITY
Start: 2020-01-01

## 2021-01-01 RX ORDER — ACETAMINOPHEN 325 MG/1
650 TABLET ORAL EVERY 4 HOURS PRN
Status: DISCONTINUED | OUTPATIENT
Start: 2021-01-01 | End: 2021-01-01 | Stop reason: HOSPADM

## 2021-01-01 RX ORDER — ONDANSETRON 4 MG/1
4 TABLET, ORALLY DISINTEGRATING ORAL EVERY 6 HOURS PRN
Status: DISCONTINUED | OUTPATIENT
Start: 2021-01-01 | End: 2021-01-01 | Stop reason: HOSPADM

## 2021-01-01 RX ORDER — NICOTINE 21 MG/24HR
1 PATCH, TRANSDERMAL 24 HOURS TRANSDERMAL DAILY PRN
Status: DISCONTINUED | OUTPATIENT
Start: 2021-01-01 | End: 2021-01-01 | Stop reason: HOSPADM

## 2021-01-01 RX ORDER — OLANZAPINE 10 MG/2ML
5 INJECTION, POWDER, FOR SOLUTION INTRAMUSCULAR DAILY PRN
Status: DISCONTINUED | OUTPATIENT
Start: 2021-01-01 | End: 2021-01-01 | Stop reason: HOSPADM

## 2021-01-01 RX ORDER — DIAZEPAM 2 MG
2 TABLET ORAL DAILY PRN
Status: DISCONTINUED | OUTPATIENT
Start: 2021-01-01 | End: 2021-01-01

## 2021-01-01 RX ORDER — BUDESONIDE AND FORMOTEROL FUMARATE DIHYDRATE 160; 4.5 UG/1; UG/1
2 AEROSOL RESPIRATORY (INHALATION) 2 TIMES DAILY
COMMUNITY
Start: 2020-01-01

## 2021-01-01 RX ORDER — GABAPENTIN 100 MG/1
200 CAPSULE ORAL 3 TIMES DAILY
Status: DISCONTINUED | OUTPATIENT
Start: 2021-01-01 | End: 2021-01-01 | Stop reason: HOSPADM

## 2021-01-01 RX ORDER — DIAZEPAM 5 MG
5 TABLET ORAL EVERY 6 HOURS PRN
Status: DISCONTINUED | OUTPATIENT
Start: 2021-01-01 | End: 2021-01-01 | Stop reason: HOSPADM

## 2021-01-01 RX ORDER — BUDESONIDE AND FORMOTEROL FUMARATE DIHYDRATE 160; 4.5 UG/1; UG/1
2 AEROSOL RESPIRATORY (INHALATION) 2 TIMES DAILY
Status: DISCONTINUED | OUTPATIENT
Start: 2021-01-01 | End: 2021-01-01 | Stop reason: HOSPADM

## 2021-01-01 RX ORDER — OLANZAPINE 10 MG/2ML
5 INJECTION, POWDER, FOR SOLUTION INTRAMUSCULAR ONCE
Status: COMPLETED | OUTPATIENT
Start: 2021-01-01 | End: 2021-01-01

## 2021-01-01 RX ORDER — LOSARTAN POTASSIUM 25 MG/1
25 TABLET ORAL DAILY
COMMUNITY
Start: 2020-01-01

## 2021-01-01 RX ORDER — LIDOCAINE 40 MG/G
CREAM TOPICAL
Status: DISCONTINUED | OUTPATIENT
Start: 2021-01-01 | End: 2021-01-01 | Stop reason: HOSPADM

## 2021-01-01 RX ORDER — SIMETHICONE 125 MG
1 TABLET,CHEWABLE ORAL 4 TIMES DAILY PRN
COMMUNITY
Start: 2020-01-01

## 2021-01-01 RX ORDER — LANOLIN ALCOHOL/MO/W.PET/CERES
3 CREAM (GRAM) TOPICAL
Status: DISCONTINUED | OUTPATIENT
Start: 2021-01-01 | End: 2021-01-01 | Stop reason: HOSPADM

## 2021-01-01 RX ORDER — TRAZODONE HYDROCHLORIDE 50 MG/1
50 TABLET, FILM COATED ORAL AT BEDTIME
Status: DISCONTINUED | OUTPATIENT
Start: 2021-01-01 | End: 2021-01-01 | Stop reason: HOSPADM

## 2021-01-01 RX ORDER — OLANZAPINE 5 MG/1
15 TABLET, ORALLY DISINTEGRATING ORAL ONCE
Status: COMPLETED | OUTPATIENT
Start: 2021-01-01 | End: 2021-01-01

## 2021-01-01 RX ORDER — LORAZEPAM 1 MG/1
1-2 TABLET ORAL EVERY 30 MIN PRN
Status: DISCONTINUED | OUTPATIENT
Start: 2021-01-01 | End: 2021-01-01 | Stop reason: HOSPADM

## 2021-01-01 RX ORDER — DIAZEPAM 10 MG/2ML
5 INJECTION, SOLUTION INTRAMUSCULAR; INTRAVENOUS ONCE
Status: COMPLETED | OUTPATIENT
Start: 2021-01-01 | End: 2021-01-01

## 2021-01-01 RX ORDER — OLOPATADINE HYDROCHLORIDE 1 MG/ML
1 SOLUTION/ DROPS OPHTHALMIC 2 TIMES DAILY
COMMUNITY

## 2021-01-01 RX ORDER — CITALOPRAM HYDROBROMIDE 20 MG/1
40 TABLET ORAL DAILY
Status: DISCONTINUED | OUTPATIENT
Start: 2021-01-01 | End: 2021-01-01 | Stop reason: HOSPADM

## 2021-01-01 RX ORDER — CALCIUM CARBONATE 500 MG/1
1000 TABLET, CHEWABLE ORAL 4 TIMES DAILY PRN
Status: DISCONTINUED | OUTPATIENT
Start: 2021-01-01 | End: 2021-01-01 | Stop reason: HOSPADM

## 2021-01-01 RX ADMIN — DICLOFENAC SODIUM 4 G: 10 GEL TOPICAL at 17:41

## 2021-01-01 RX ADMIN — DICLOFENAC SODIUM 4 G: 10 GEL TOPICAL at 21:05

## 2021-01-01 RX ADMIN — OLANZAPINE 5 MG: 10 INJECTION, POWDER, FOR SOLUTION INTRAMUSCULAR at 05:30

## 2021-01-01 RX ADMIN — LORAZEPAM 2 MG: 1 TABLET ORAL at 23:00

## 2021-01-01 RX ADMIN — Medication 100 MG: at 09:04

## 2021-01-01 RX ADMIN — FOLIC ACID 1 MG: 1 TABLET ORAL at 17:37

## 2021-01-01 RX ADMIN — SODIUM CHLORIDE: 9 INJECTION, SOLUTION INTRAVENOUS at 06:15

## 2021-01-01 RX ADMIN — CITALOPRAM HYDROBROMIDE 40 MG: 20 TABLET ORAL at 13:40

## 2021-01-01 RX ADMIN — OLOPATADINE HYDROCHLORIDE 1 DROP: 1 SOLUTION/ DROPS OPHTHALMIC at 20:51

## 2021-01-01 RX ADMIN — OLOPATADINE HYDROCHLORIDE 1 DROP: 1 SOLUTION/ DROPS OPHTHALMIC at 13:45

## 2021-01-01 RX ADMIN — DICLOFENAC SODIUM 4 G: 10 GEL TOPICAL at 10:51

## 2021-01-01 RX ADMIN — GABAPENTIN 200 MG: 100 CAPSULE ORAL at 09:04

## 2021-01-01 RX ADMIN — ACETAMINOPHEN 650 MG: 325 TABLET, FILM COATED ORAL at 16:28

## 2021-01-01 RX ADMIN — ACETAMINOPHEN 650 MG: 325 TABLET, FILM COATED ORAL at 01:23

## 2021-01-01 RX ADMIN — LORAZEPAM 1 MG: 1 TABLET ORAL at 17:17

## 2021-01-01 RX ADMIN — FOLIC ACID 1 MG: 1 TABLET ORAL at 09:06

## 2021-01-01 RX ADMIN — MULTIPLE VITAMINS W/ MINERALS TAB 1 TABLET: TAB at 07:47

## 2021-01-01 RX ADMIN — ACETAMINOPHEN 650 MG: 325 TABLET, FILM COATED ORAL at 09:04

## 2021-01-01 RX ADMIN — LORAZEPAM 1 MG: 1 TABLET ORAL at 09:59

## 2021-01-01 RX ADMIN — MISOPROSTOL 200 MCG: 200 TABLET ORAL at 13:40

## 2021-01-01 RX ADMIN — GABAPENTIN 200 MG: 100 CAPSULE ORAL at 21:04

## 2021-01-01 RX ADMIN — DICLOFENAC SODIUM 75 MG: 75 TABLET, DELAYED RELEASE ORAL at 07:47

## 2021-01-01 RX ADMIN — DIAZEPAM 5 MG: 5 TABLET ORAL at 13:40

## 2021-01-01 RX ADMIN — Medication 100 MG: at 10:45

## 2021-01-01 RX ADMIN — CITALOPRAM HYDROBROMIDE 40 MG: 20 TABLET ORAL at 10:45

## 2021-01-01 RX ADMIN — LEVOTHYROXINE SODIUM 112 MCG: 0.11 TABLET ORAL at 08:13

## 2021-01-01 RX ADMIN — MULTIPLE VITAMINS W/ MINERALS TAB 1 TABLET: TAB at 09:06

## 2021-01-01 RX ADMIN — GABAPENTIN 200 MG: 100 CAPSULE ORAL at 14:49

## 2021-01-01 RX ADMIN — MISOPROSTOL 200 MCG: 200 TABLET ORAL at 21:04

## 2021-01-01 RX ADMIN — GABAPENTIN 200 MG: 100 CAPSULE ORAL at 20:46

## 2021-01-01 RX ADMIN — OLOPATADINE HYDROCHLORIDE 1 DROP: 1 SOLUTION/ DROPS OPHTHALMIC at 20:49

## 2021-01-01 RX ADMIN — Medication 100 MG: at 07:47

## 2021-01-01 RX ADMIN — ACETAMINOPHEN 650 MG: 325 TABLET, FILM COATED ORAL at 09:55

## 2021-01-01 RX ADMIN — LORAZEPAM 1 MG: 1 TABLET ORAL at 18:01

## 2021-01-01 RX ADMIN — ACETAMINOPHEN 650 MG: 325 TABLET, FILM COATED ORAL at 20:46

## 2021-01-01 RX ADMIN — OLANZAPINE 5 MG: 10 INJECTION, POWDER, FOR SOLUTION INTRAMUSCULAR at 04:57

## 2021-01-01 RX ADMIN — DIAZEPAM 5 MG: 5 TABLET ORAL at 03:25

## 2021-01-01 RX ADMIN — SODIUM CHLORIDE: 9 INJECTION, SOLUTION INTRAVENOUS at 14:25

## 2021-01-01 RX ADMIN — ACETAMINOPHEN 650 MG: 325 TABLET, FILM COATED ORAL at 00:35

## 2021-01-01 RX ADMIN — DICLOFENAC SODIUM 4 G: 10 GEL TOPICAL at 13:16

## 2021-01-01 RX ADMIN — LORAZEPAM 1 MG: 1 TABLET ORAL at 18:49

## 2021-01-01 RX ADMIN — OLOPATADINE HYDROCHLORIDE 1 DROP: 1 SOLUTION/ DROPS OPHTHALMIC at 21:05

## 2021-01-01 RX ADMIN — LEVOTHYROXINE SODIUM 112 MCG: 0.11 TABLET ORAL at 09:55

## 2021-01-01 RX ADMIN — CITALOPRAM HYDROBROMIDE 40 MG: 20 TABLET ORAL at 07:46

## 2021-01-01 RX ADMIN — MULTIPLE VITAMINS W/ MINERALS TAB 1 TABLET: TAB at 10:44

## 2021-01-01 RX ADMIN — DICLOFENAC SODIUM 75 MG: 75 TABLET, DELAYED RELEASE ORAL at 20:45

## 2021-01-01 RX ADMIN — LORAZEPAM 1 MG: 1 TABLET ORAL at 22:02

## 2021-01-01 RX ADMIN — DICLOFENAC SODIUM 4 G: 10 GEL TOPICAL at 08:26

## 2021-01-01 RX ADMIN — SODIUM CHLORIDE: 9 INJECTION, SOLUTION INTRAVENOUS at 21:06

## 2021-01-01 RX ADMIN — SODIUM CHLORIDE: 9 INJECTION, SOLUTION INTRAVENOUS at 00:28

## 2021-01-01 RX ADMIN — TRAZODONE HYDROCHLORIDE 50 MG: 50 TABLET ORAL at 21:01

## 2021-01-01 RX ADMIN — MISOPROSTOL 200 MCG: 200 TABLET ORAL at 09:04

## 2021-01-01 RX ADMIN — DICLOFENAC SODIUM 4 G: 10 GEL TOPICAL at 16:59

## 2021-01-01 RX ADMIN — DICLOFENAC SODIUM 4 G: 10 GEL TOPICAL at 07:53

## 2021-01-01 RX ADMIN — DICLOFENAC SODIUM 75 MG: 75 TABLET, DELAYED RELEASE ORAL at 09:06

## 2021-01-01 RX ADMIN — DICLOFENAC SODIUM 75 MG: 75 TABLET, DELAYED RELEASE ORAL at 20:43

## 2021-01-01 RX ADMIN — GABAPENTIN 200 MG: 100 CAPSULE ORAL at 13:16

## 2021-01-01 RX ADMIN — GABAPENTIN 200 MG: 100 CAPSULE ORAL at 10:45

## 2021-01-01 RX ADMIN — DICLOFENAC SODIUM 4 G: 10 GEL TOPICAL at 20:49

## 2021-01-01 RX ADMIN — DICLOFENAC SODIUM 75 MG: 75 TABLET, DELAYED RELEASE ORAL at 13:39

## 2021-01-01 RX ADMIN — DIAZEPAM 5 MG: 5 TABLET ORAL at 20:46

## 2021-01-01 RX ADMIN — MISOPROSTOL 200 MCG: 200 TABLET ORAL at 20:46

## 2021-01-01 RX ADMIN — GABAPENTIN 200 MG: 100 CAPSULE ORAL at 09:54

## 2021-01-01 RX ADMIN — LORAZEPAM 1 MG: 1 TABLET ORAL at 02:22

## 2021-01-01 RX ADMIN — LORAZEPAM 1 MG: 1 TABLET ORAL at 01:23

## 2021-01-01 RX ADMIN — MISOPROSTOL 200 MCG: 200 TABLET ORAL at 09:53

## 2021-01-01 RX ADMIN — LOSARTAN POTASSIUM 25 MG: 25 TABLET, FILM COATED ORAL at 09:59

## 2021-01-01 RX ADMIN — LORAZEPAM 1 MG: 1 TABLET ORAL at 00:38

## 2021-01-01 RX ADMIN — FOLIC ACID 1 MG: 1 TABLET ORAL at 13:41

## 2021-01-01 RX ADMIN — SODIUM CHLORIDE: 9 INJECTION, SOLUTION INTRAVENOUS at 08:04

## 2021-01-01 RX ADMIN — Medication 100 MG: at 13:38

## 2021-01-01 RX ADMIN — LORAZEPAM 1 MG: 1 TABLET ORAL at 19:17

## 2021-01-01 RX ADMIN — DICLOFENAC SODIUM 4 G: 10 GEL TOPICAL at 20:51

## 2021-01-01 RX ADMIN — FOLIC ACID 1 MG: 1 TABLET ORAL at 10:45

## 2021-01-01 RX ADMIN — Medication 3 MG: at 01:23

## 2021-01-01 RX ADMIN — OLOPATADINE HYDROCHLORIDE 1 DROP: 1 SOLUTION/ DROPS OPHTHALMIC at 10:51

## 2021-01-01 RX ADMIN — MISOPROSTOL 200 MCG: 200 TABLET ORAL at 20:43

## 2021-01-01 RX ADMIN — DICLOFENAC SODIUM 75 MG: 75 TABLET, DELAYED RELEASE ORAL at 09:53

## 2021-01-01 RX ADMIN — DICLOFENAC SODIUM 4 G: 10 GEL TOPICAL at 12:34

## 2021-01-01 RX ADMIN — GABAPENTIN 200 MG: 100 CAPSULE ORAL at 13:39

## 2021-01-01 RX ADMIN — OLOPATADINE HYDROCHLORIDE 1 DROP: 1 SOLUTION/ DROPS OPHTHALMIC at 07:52

## 2021-01-01 RX ADMIN — OLANZAPINE 15 MG: 5 TABLET, ORALLY DISINTEGRATING ORAL at 04:43

## 2021-01-01 RX ADMIN — LORAZEPAM 2 MG: 1 TABLET ORAL at 03:25

## 2021-01-01 RX ADMIN — ACETAMINOPHEN 650 MG: 325 TABLET, FILM COATED ORAL at 12:15

## 2021-01-01 RX ADMIN — LORAZEPAM 2 MG: 1 TABLET ORAL at 20:44

## 2021-01-01 RX ADMIN — OLOPATADINE HYDROCHLORIDE 1 DROP: 1 SOLUTION/ DROPS OPHTHALMIC at 09:55

## 2021-01-01 RX ADMIN — DICLOFENAC SODIUM 4 G: 10 GEL TOPICAL at 14:49

## 2021-01-01 RX ADMIN — SODIUM CHLORIDE: 9 INJECTION, SOLUTION INTRAVENOUS at 00:05

## 2021-01-01 RX ADMIN — GABAPENTIN 200 MG: 100 CAPSULE ORAL at 13:44

## 2021-01-01 RX ADMIN — MISOPROSTOL 200 MCG: 200 TABLET ORAL at 07:47

## 2021-01-01 RX ADMIN — GABAPENTIN 200 MG: 100 CAPSULE ORAL at 20:43

## 2021-01-01 RX ADMIN — DICLOFENAC SODIUM 4 G: 10 GEL TOPICAL at 09:54

## 2021-01-01 RX ADMIN — CITALOPRAM HYDROBROMIDE 40 MG: 20 TABLET ORAL at 09:04

## 2021-01-01 RX ADMIN — ACETAMINOPHEN 650 MG: 325 TABLET, FILM COATED ORAL at 02:30

## 2021-01-01 RX ADMIN — DIAZEPAM 5 MG: 5 TABLET ORAL at 07:46

## 2021-01-01 RX ADMIN — SODIUM CHLORIDE: 9 INJECTION, SOLUTION INTRAVENOUS at 08:26

## 2021-01-01 RX ADMIN — MULTIPLE VITAMINS W/ MINERALS TAB 1 TABLET: TAB at 17:37

## 2021-01-01 RX ADMIN — GABAPENTIN 200 MG: 100 CAPSULE ORAL at 14:23

## 2021-01-01 RX ADMIN — OLOPATADINE HYDROCHLORIDE 1 DROP: 1 SOLUTION/ DROPS OPHTHALMIC at 20:46

## 2021-01-01 RX ADMIN — DIAZEPAM 5 MG: 5 TABLET ORAL at 10:49

## 2021-01-01 RX ADMIN — DIAZEPAM 5 MG: 5 TABLET ORAL at 01:23

## 2021-01-01 RX ADMIN — LOSARTAN POTASSIUM 25 MG: 25 TABLET, FILM COATED ORAL at 09:06

## 2021-01-01 RX ADMIN — CITALOPRAM HYDROBROMIDE 40 MG: 20 TABLET ORAL at 09:54

## 2021-01-01 RX ADMIN — BUDESONIDE AND FORMOTEROL FUMARATE DIHYDRATE 2 PUFF: 160; 4.5 AEROSOL RESPIRATORY (INHALATION) at 07:35

## 2021-01-01 RX ADMIN — TRAZODONE HYDROCHLORIDE 50 MG: 50 TABLET ORAL at 20:45

## 2021-01-01 RX ADMIN — DICLOFENAC SODIUM 75 MG: 75 TABLET, DELAYED RELEASE ORAL at 21:04

## 2021-01-01 RX ADMIN — MISOPROSTOL 200 MCG: 200 TABLET ORAL at 10:45

## 2021-01-01 RX ADMIN — OLOPATADINE HYDROCHLORIDE 1 DROP: 1 SOLUTION/ DROPS OPHTHALMIC at 09:08

## 2021-01-01 RX ADMIN — BUDESONIDE AND FORMOTEROL FUMARATE DIHYDRATE 2 PUFF: 160; 4.5 AEROSOL RESPIRATORY (INHALATION) at 09:05

## 2021-01-01 RX ADMIN — DICLOFENAC SODIUM 4 G: 10 GEL TOPICAL at 02:29

## 2021-01-01 RX ADMIN — DICLOFENAC SODIUM 75 MG: 75 TABLET, DELAYED RELEASE ORAL at 10:44

## 2021-01-01 RX ADMIN — DIAZEPAM 5 MG: 10 INJECTION, SOLUTION INTRAMUSCULAR; INTRAVENOUS at 21:35

## 2021-01-01 RX ADMIN — LEVOTHYROXINE SODIUM 112 MCG: 0.11 TABLET ORAL at 10:51

## 2021-01-01 RX ADMIN — SODIUM CHLORIDE 1000 ML: 9 INJECTION, SOLUTION INTRAVENOUS at 04:21

## 2021-01-01 RX ADMIN — DICLOFENAC SODIUM 4 G: 10 GEL TOPICAL at 16:47

## 2021-01-01 RX ADMIN — Medication 100 MG: at 17:38

## 2021-01-01 RX ADMIN — LORAZEPAM 2 MG: 1 TABLET ORAL at 02:28

## 2021-01-01 RX ADMIN — DICLOFENAC SODIUM 4 G: 10 GEL TOPICAL at 13:45

## 2021-01-01 RX ADMIN — LEVOTHYROXINE SODIUM 112 MCG: 0.11 TABLET ORAL at 13:40

## 2021-01-01 RX ADMIN — GABAPENTIN 200 MG: 100 CAPSULE ORAL at 07:46

## 2021-01-01 RX ADMIN — LORAZEPAM 2 MG: 1 TABLET ORAL at 21:48

## 2021-01-01 RX ADMIN — TRAZODONE HYDROCHLORIDE 50 MG: 50 TABLET ORAL at 21:04

## 2021-01-01 RX ADMIN — DIAZEPAM 5 MG: 5 TABLET ORAL at 16:18

## 2021-01-01 RX ADMIN — LEVOTHYROXINE SODIUM 112 MCG: 0.11 TABLET ORAL at 06:15

## 2021-01-01 RX ADMIN — DICLOFENAC SODIUM 4 G: 10 GEL TOPICAL at 20:46

## 2021-01-01 RX ADMIN — FOLIC ACID 1 MG: 1 TABLET ORAL at 07:47

## 2021-01-01 RX ADMIN — SODIUM CHLORIDE: 9 INJECTION, SOLUTION INTRAVENOUS at 16:20

## 2021-01-01 ASSESSMENT — ACTIVITIES OF DAILY LIVING (ADL)
ADLS_ACUITY_SCORE: 19
ADLS_ACUITY_SCORE: 18
ADLS_ACUITY_SCORE: 19
ADLS_ACUITY_SCORE: 17
DIFFICULTY_EATING/SWALLOWING: NO
PATIENT_/_FAMILY_COMMUNICATION_STYLE: SPOKEN LANGUAGE (ENGLISH OR BILINGUAL)
DIFFICULTY_COMMUNICATING: NO
ADLS_ACUITY_SCORE: 17
ADLS_ACUITY_SCORE: 16
ADLS_ACUITY_SCORE: 19
ADLS_ACUITY_SCORE: 16
ADLS_ACUITY_SCORE: 19
WHICH_OF_THE_ABOVE_FUNCTIONAL_RISKS_HAD_A_RECENT_ONSET_OR_CHANGE?: AMBULATION
ADLS_ACUITY_SCORE: 19
TOILETING_ASSISTANCE: TOILETING DIFFICULTY, ASSISTANCE 1 PERSON
ADLS_ACUITY_SCORE: 19
WEAR_GLASSES_OR_BLIND: NO
ADLS_ACUITY_SCORE: 18
ADLS_ACUITY_SCORE: 19
ADLS_ACUITY_SCORE: 19
DOING_ERRANDS_INDEPENDENTLY_DIFFICULTY: OTHER (SEE COMMENTS)
ADLS_ACUITY_SCORE: 19
ADLS_ACUITY_SCORE: 18
HEARING_DIFFICULTY_OR_DEAF: NO
ADLS_ACUITY_SCORE: 19
ADLS_ACUITY_SCORE: 19
FALL_HISTORY_WITHIN_LAST_SIX_MONTHS: YES
ADLS_ACUITY_SCORE: 19
ADLS_ACUITY_SCORE: 18
ADLS_ACUITY_SCORE: 17
TOILETING_ISSUES: YES
ADLS_ACUITY_SCORE: 17
WALKING_OR_CLIMBING_STAIRS_DIFFICULTY: NO
CONCENTRATING,_REMEMBERING_OR_MAKING_DECISIONS_DIFFICULTY: NO
ADLS_ACUITY_SCORE: 16
DRESSING/BATHING_DIFFICULTY: NO

## 2021-01-01 ASSESSMENT — ENCOUNTER SYMPTOMS
ABDOMINAL PAIN: 0
CHILLS: 0
DIFFICULTY URINATING: 0
UNEXPECTED WEIGHT CHANGE: 0
WHEEZING: 0
PALPITATIONS: 0
CONFUSION: 0
BACK PAIN: 0
NECK STIFFNESS: 0
EYE REDNESS: 0
HEADACHES: 0
COLOR CHANGE: 0

## 2021-01-01 ASSESSMENT — MIFFLIN-ST. JEOR: SCORE: 1539.13

## 2021-01-19 PROBLEM — M62.82 NON-TRAUMATIC RHABDOMYOLYSIS: Status: ACTIVE | Noted: 2021-01-01

## 2021-01-19 PROBLEM — E87.1 HYPONATREMIA: Status: ACTIVE | Noted: 2021-01-01

## 2021-01-19 NOTE — PLAN OF CARE
OT order received. Patient was discussed in morning care conference today. Per Dr. Onofre, patient is not medically appropriate for rehab at this time. Will reassess patient's status tomorrow and complete OT evaluation as indicated.

## 2021-01-19 NOTE — PLAN OF CARE
Face to face report given with opportunity to observe patient.    Report given to Nora and Rebecca RNs    Holly España, TUAN   1/19/2021  3:34 PM

## 2021-01-19 NOTE — PLAN OF CARE
Prior to Admission Medication Reconciliation:     Medications added:   [] None  [x] As listed below:    symbicort    Medications deleted:   [] None  [x] As listed below:    Hydrochlorothiazide 25 mg    tolteradine 4 mg    patanol eye drops (ordered)- I deleted this med but then re-added it. Pt states she is on this and has to pay cash for it and that she gets it from [facility]. No fill history available. Possible that pt is confusing this for a different medication.     Nasacort(ordered)    Changes made to existing medications:   [] None  [x] As listed below:    Updated strengths and frequencies    Last times/dates taken verified with patient:  [x] Yes- completed myself: pt poor historian at this time. Was unable to give me exact times taken and said she took them within the past week.   [] Nurse completed no changes made  [] Unable to review with patient:  [] Nurse completed/changes made:         Allergy modifications:    []Did not review  []Patient verified NKA  []Patient verified current existing allergies: no changes made  [x]Allergies at [facility]: listed below    Fluticasone (already in profile)    Medication reconciliation sources:   []Patient  []Patient family member/emergency contact: **  [][facility] Report Review:  [x]Albuterol proair 2 puffs q4h prn   [x]Atorvastatin 10 mg dialy  [x]symbicort 2 puffs BID  [x]Fiber lax (calcium polycarbophil) 2 tabs daily  [x]Cetirizine 10 mg daily  [x]Citalopram 40 mg daily   [x]Diazepam 2 mg daily prn   [x]Diclofenac- misoprostol 75 mg-200 mcg BID  [x]duonebs 3 ml q6h prn   [x]Lactase 3000 units w/ first bite of dairy food  [x]Levothyroxine 112 mcg daily  [x]Losartan 25 mg daily   [x]Nystatin 100,000 unit/gm apply topically BID  [x]botox injection given in office in bladder during cystoscopy 100 mg   [x]Simethicone 125 mg QID prn   [x]Trazodone 50 mg at bedtime   [x]Triamcinolone cream 0.1% QID prn topical    []Epic Chart Review  []Care Everywhere review  [x]Pharmacy med  list: [facility]   Name Strength Instructions Last Fill Date QTY/DS Notes   [x] flexeril 10 mg 1 tab at bedtime prn  1/8/21 30    [x] diazepam 2 mg  1 tab daily prn muscle spasm 1/8/21 30    [x] simethicone 125 mg  1 tab before meals as needed; administer with first bite of food 1/8/21 120    [x] fibercon  625 mg 2 tabs daily 1/8/21 60    [x] Duo-nebs  3 ml q6h prn  1/8/21 180    [x] lipitor 10 mg Daily  1/8/21 30    [x] Arthrotec  75-0.2 mg BID 1/8/21 60    [x] Levothyroxine  112 mcg  Daily  1/8/21 30    [x] citalopram 40 mg Daily  1/8/21 30    [x] trazodone 50 mg At bedtime  1/8/21 30    [x] Cetirizine  10 mg Daily  1/8/21 30    [x] symbicort  160-4.5 2 puffs BID 1/8/21     [x] Losartan  25 mg Daily  1/8/21 30    []Pharmacy phone call  []Outside meds dispense report  []Nursing home or Assisted Living MAR:  []Other: **    Pharmacy desired at discharge: [facility]    Is patient on coumadin?  [x]No    Requests for consultation by provider or pharmacist:   [] Patient understands why all of their meds were prescribed and how to take them. No questions.   [x] Fill dates coincide with compliancy for all maintenance meds.   [] Fill dates do not coincide with compliancy with maintenance meds. See notes in PTA medlist about how patient is taking.   [] Patient has questions about the following:    Comments: pt manages her own meds. Poor historian at this time. Recognized the medications I went through with her but wasn't able to give specifics about strengths or last times taken. Completed with the best of my ability using [facility] rx fills and [facility] medlist.       Maribel Reed on 1/19/2021 at 8:36 AM       Discrepancies: [x] No []Not Applicable []Yes: listed below        Time spent on medication reconciliation:   []5-20 mins  []20-40 mins  [x]> 40 mins    Issues completing PTA medication reconciliation:  [] On hold for a long time  [] Waited for a call back  [] Fax didn't come through  [x] Fax took a long time  []  Other:    Notifying appropriate party of changes/additions/discrepancies:  []No pertinent changes made, notification not necessary.   [] Notified attending provider via text page  [] Notified attending provider in person  [x] Notified pharmacy: text page for provider unavailable  [] Notified nurse  [] Attending provider not available, left detailed notes  [] Changes/additions made don't need provider notification because provider has not seen patient or input any orders  [] Changes/additions made don't need provider notification because changes made are to medications not ordered  Medications Prior to Admission   Medication Sig Dispense Refill Last Dose     atorvastatin (LIPITOR) 10 MG tablet Take 10 mg by mouth daily   Past Week at am     cetirizine (ZYRTEC) 10 MG tablet Take 10 mg by mouth daily   Past Week at Unknown time     citalopram (CELEXA) 40 MG tablet Take 40 mg by mouth daily    Past Week at Unknown time     cyclobenzaprine (FLEXERIL) 10 MG tablet Take 10 mg by mouth nightly as needed   Past Week at Unknown time     diazepam (VALIUM) 2 MG tablet Take 1 tablet by mouth daily as needed for muscle spasms    Past Week at hs     diclofenac-misoprostol (ARTHROTEC 75) 75-0.2 MG per EC tablet Take 1 tablet by mouth 2 times daily   Past Week at Unknown time     FIBER- MG tablet Take 2 tablets by mouth daily   Past Week at Unknown time     ipratropium - albuterol 0.5 mg/2.5 mg/3 mL (DUONEB) 0.5-2.5 (3) MG/3ML neb solution Inhale 3 mLs into the lungs every 6 hours as needed   Past Week at Unknown time     lactase (LACTAID) 3000 UNIT tablet Take 3,000 Units by mouth as needed before meals; administer with first bite of dairy food.   Past Week at Unknown time     levothyroxine (SYNTHROID/LEVOTHROID) 112 MCG tablet Take 112 mcg by mouth daily   Past Week at Unknown time     losartan (COZAAR) 25 MG tablet Take 25 mg by mouth daily   Past Week at Unknown time     olopatadine (PATANOL) 0.1 % ophthalmic solution  Place 1 drop into both eyes 2 times daily   Past Week at Unknown time     simethicone (MYLICON) 125 MG chewable tablet Take 1 tablet by mouth 4 times daily as needed   Past Week at Unknown time     SYMBICORT 160-4.5 MCG/ACT Inhaler Inhale 2 puffs into the lungs 2 times daily    Past Week at Unknown time     traZODone (DESYREL) 50 MG tablet Take 50 mg by mouth At Bedtime    Past Week at Unknown time     triamcinolone (KENALOG) 0.1 % cream Apply topically 4 times daily as needed    Past Week at Unknown time     albuterol (PROAIR HFA/PROVENTIL HFA/VENTOLIN HFA) 108 (90 BASE) MCG/ACT Inhaler Inhale 2 puffs into the lungs every 4 hours as needed    More than a month at Unknown time     EPINEPHrine (EPIPEN) 0.3 MG/0.3ML injection Inject 0.3 mg into the muscle once as needed. For anaphylaxis   Unknown at Unknown time     nystatin (MYCOSTATIN) 886436 UNIT/GM external cream Apply topically 2 times daily   More than a month at Unknown time     OnabotulinumtoxinA (BOTOX IJ) Botox injection given in office in bladder during cystoscopy 100 mg.   More than a month at Unknown time

## 2021-01-19 NOTE — ED NOTES
Pt placed on 2lpm NC during sleep, sats 87% RA. Pt states she wear 2lpm at HS. Only RA during day.

## 2021-01-19 NOTE — PROGRESS NOTES
CTS attempted to visit with patient today. Patient was sleeping. Not appropriate for conversation today.

## 2021-01-19 NOTE — H&P
"[facility]    History and Physical - Hospitalist Service       Date of Admission:  1/19/2021    Assessment & Plan   Jimena Mensah is a 65 year old female admitted on 1/19/2021.      Hyponatremia (123): NS IV hydration, trend sodium    Rhabdomyolysis (CK 3051): IV hydration, trend renal function    Associated fall: PT/OT needs assessment    Associated acute on chronic bilateral knee, right hip and lower back pain: trial Gabapentin, Voltaren Gel, PT/OT    ETOH intoxication: supportive cares, monitor for withdrawal    Chronic hypoxic respiratory failure: currently at baseline with 2 LPM NC    Tobacco dependence: topical Nicotine available (yes, she smokes and uses oxygen.)           Diet:   cardiac  DVT Prophylaxis: Pneumatic Compression Devices  Ashley Catheter: not present  Code Status:   Full         Disposition Plan   Expected discharge: 2 - 3 days, recommended to prior living arrangement once discharge plan established.  Entered: aCsimiro Adair DO 01/19/2021, 5:10 AM     The patient's care was discussed with the Patient.    Casimiro Adair DO  [facility]  Contact information available via [facility] Paging/Directory      ______________________________________________________________________    Chief Complaint   Fell and unable to get up    History is obtained from the patient, EHR, ER Provider    History of Present Illness   Jimena Mensah is a 65 year old female who drinks regularly; this EHR shows regular elevated ethanol levels. She has chronic knee pain and will occasionally have her knees \"give out\"; she has [employer], but can get up again most times.    She had been helping with a neighbor who has Alzheimer's by staying at their apartment and helping out. The friend got tired of the steady company and the friend's daughter didn't want the patient to stay anymore. So, back at her apartment, she was feeling rejected and depressed and started drinking ETOH. She fell and took several " hours resting on the floor and trying to get up before using Life Line    ER Course: vitals were showing HTN (156/72), otherwise normal for her, including normal saturation on 2 LPM NC; she was in some distress from back, hip and knee pain. Lab diagnostics resulted a chemistry panel showing preserved stage 1 renal performance, hyponatremia (123), 2:1 AST/ALT transaminitis, elevated CK (3051). The heme profile showed elevated WBC (15.2) with left shift and UA was negative for UTI. CT of the pelvis showed no fracture. Chest film didn't seem to show acute disease. She was given IV fluids    Review of Systems       Constitutional: No fever or chills, no generalized weakness, no unintentional weight change, no particular change in appetite  Ears, Nose & Throat: no sore throat, no nasal drainage, no congestion. No ear pain, no ear drainage, no particular change in hearing  Eyes: no particular change in vision, no redness, no drainage  Cardiovascular: No chest pain at rest, no chest pain with familiar activities.  Pulmonary: No cough, no particular change in work of breathing, no particular change in shortness of breath with position changes or familiar activites  Gastrointestinal: No abdominal pain, no nausea, no vomiting, no diarrhea. No particular change in bowel movement pattern, no black stools, no bloody stools  Genitourinary: No particular change in incontinence, no pain with urination, no particular change in stream, no particular change in amount urinated with urge, no discharge  Skin: No particular change in bruising, no rashes  Musculoskeletal: no particular change in strength, chronic bilateral knee pain, right hip pain and now, lower back pain  Neurological: no numbness and tingling, no headache, no particular change in balance, no dizziness  Psychologic: was feeling rejected and sad about her friend and family rejecting her company  Endocrine: No particular change in heat or cold intolerance        Past  Medical History    I have reviewed this patient's medical history and updated it with pertinent information if needed.   Past Medical History:   Diagnosis Date     Alcoholism (H)      Allergic rhinitis, cause unspecified 2012     Arthritis      Follow-up examination, following other surgery 2012     Hallucinations 10/23/2008     Hypertension      Mixed hearing loss, unspecified 2012     Sensorineural hearing loss, unspecified 2012     Thyroid disease        Past Surgical History   I have reviewed this patient's surgical history and updated it with pertinent information if needed.  Past Surgical History:   Procedure Laterality Date     bleeding,fibroids      NOBLE/BSO     cancer      bowel resection;      CARDIAC SURGERY      angiogram      delivery x 2       COLONOSCOPY       COLONOSCOPY  6/3/2013    Procedure: COLONOSCOPY;  COLONSCOPY WITH POSSIBLE BIOPSY;  Surgeon: Yudy Kamara DO;  Location: [facility]     COLONOSCOPY N/A 10/23/2014    Procedure: COLONOSCOPY;  Surgeon: Jordan Stephenson MD;  Location: [facility] OR     COLONOSCOPY - HIM SCAN  2006    Colonoscopy/flex diagnostic 2006     COLONOSCOPY - HIM SCAN  2008    Colonoscopy/flex diagnostic     COLONOSCOPY - HIM SCAN  2004    Colonoscopy with bx-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 3/24/2004     COLONOSCOPY - HIM SCAN  2005    Colonoscopy with bx-mild inflammatory change consistent with anastomotic site 2005     GYN SURGERY      hysterectomy     PHACOEMULSIFICATION WITH STANDARD INTRAOCULAR LENS IMPLANT Left 10/16/2018    Procedure: CATARACT EXTRACTION LEFT EYE WITH IMPLANT;  Surgeon: Jamarcus Pham MD;  Location: [facility] OR     PHACOEMULSIFICATION WITH STANDARD INTRAOCULAR LENS IMPLANT Right 2018    Procedure: RIGHT CATARACT EXTRACTION WITH IMPLANT;  Surgeon: Jamarcus Pham MD;  Location: [facility] OR     Rotator cuff repair LT       sigmoid  "Diverticulitis      colonoscopy with polypectomy; 3/22/2010; Provider GERALD RANGEL      sinus surgery x 2       SOFT TISSUE SURGERY      lesion removal     TYMPANOMASTOIDECTOMY  2011    RT       Social History   I have reviewed this patient's social history and updated it with pertinent information if needed.  Social History     Tobacco Use     Smoking status: Current Every Day Smoker     Packs/day: 0.50     Years: 30.00     Pack years: 15.00     Types: Cigarettes     Last attempt to quit: 3/23/2016     Years since quittin.8     Smokeless tobacco: Never Used     Tobacco comment: passive exposure: yes   Substance Use Topics     Alcohol use: Yes     Comment: beer, \"way, way a lot\"     Drug use: No       Family History   I have reviewed this patient's family history and updated it with pertinent information if needed.  Family History   Problem Relation Age of Onset     Cancer Mother 39        colon, cause of death     Cancer Maternal Uncle         bone     Cancer Maternal Uncle         bone     Cancer Other         colon, grandmother     Heart Disease Maternal Grandmother         heart disease     Hypertension Maternal Grandmother      Heart Disease Paternal Uncle         heart disease     Heart Disease Paternal Uncle         heart disease       Prior to Admission Medications   Prior to Admission Medications   Prescriptions Last Dose Informant Patient Reported? Taking?   Citalopram Hydrobromide (CELEXA PO) 2021 at Unknown time  Yes Yes   Sig: Take 40 mg by mouth daily   EPINEPHrine (EPIPEN) 0.3 MG/0.3ML injection Unknown at Unknown time  Yes No   Sig: Inject 0.3 mg into the muscle once as needed. For anaphylaxis   albuterol (2.5 MG/3ML) 0.083% neb solution More than a month at Unknown time  Yes No   Sig: Inhale 2.5 mg into the lungs as needed   albuterol (PROAIR HFA/PROVENTIL HFA/VENTOLIN HFA) 108 (90 BASE) MCG/ACT Inhaler More than a month at Unknown time  Yes No   Sig: Inhale 2 puffs into the lungs as " needed   albuterol (PROVENTIL) (5 MG/ML) 0.5% nebulizer solution More than a month at Unknown time  Yes No   Sig: Take 2.5 mg by nebulization every 6 hours as needed for wheezing or shortness of breath / dyspnea   atorvastatin (LIPITOR) 10 MG tablet 1/18/2021 at Unknown time  Yes Yes   Sig: Take 10 mg by mouth daily   cetirizine (ZYRTEC) 10 MG tablet 1/18/2021 at Unknown time  Yes Yes   Sig: Take 10 mg by mouth daily   diazepam (VALIUM) 2 MG tablet Past Week at Unknown time  Yes Yes   Sig: Take 1 tablet by mouth daily as needed For vertigo   diclofenac-misoprostol (ARTHROTEC 75) 75-0.2 MG per EC tablet 1/18/2021 at Unknown time  Yes Yes   Sig: Take 1 tablet by mouth 2 times daily   hydrochlorothiazide (HYDRODIURIL) 25 MG tablet 1/18/2021 at Unknown time  Yes Yes   Sig: Take 25 mg by mouth daily   ipratropium - albuterol 0.5 mg/2.5 mg/3 mL (DUONEB) 0.5-2.5 (3) MG/3ML nebulization More than a month at Unknown time  No No   Sig: Take 1 vial (3 mLs) by nebulization every 4 hours as needed for shortness of breath / dyspnea or wheezing   levothyroxine (SYNTHROID/LEVOTHROID) 112 MCG tablet 1/18/2021 at Unknown time  Yes Yes   Sig: Take 112 mcg by mouth daily   olopatadine (PATANOL) 0.1 % ophthalmic solution 1/18/2021 at Unknown time  Yes Yes   Sig: Place 1 drop into both eyes 2 times daily.   polycarbophil (FIBERCON) 625 MG tablet   Yes No   Sig: Take 1 tablet by mouth 2 times daily as needed.   tolterodine (DETROL LA) 4 MG 24 hr capsule More than a month at Unknown time  Yes No   Sig: Take by mouth daily   traZODone (DESYREL) 50 MG tablet Past Week at Unknown time  Yes Yes   Sig: Take 50 mg by mouth daily   triamcinolone (KENALOG) 0.1 % cream Past Week at Unknown time  Yes Yes   Sig: Apply topically 2 times daily   triamcinolone (NASACORT) 55 MCG/ACT nasal inhaler More than a month at Unknown time  Yes No   Sig: Spray 2 sprays into both nostrils daily      Facility-Administered Medications: None     Allergies   Allergies  "  Allergen Reactions     Cats      Other reaction(s): *Unknown  Cat/feline product derivatives  Fur-animal dander     Dust Mite Extract      Other reaction(s): *Unknown  Trees, pollen, ragweed     Dust Mites      House dust     Fluticasone      Other reaction(s): Headache  Other reaction(s): Headache     Fluticasone Propionate Other (See Comments)     Flonase  headaches     Mometasone Other (See Comments)     Other reaction(s): Headache     Mometasone Furoate      Nasonex  Headaches     Ragweeds      Trees        Physical Exam   Vital Signs: Temp: 97.8  F (36.6  C) Temp src: Tympanic BP: 163/66 Pulse: 95   Resp: 20 SpO2: 98 % O2 Device: Nasal cannula Oxygen Delivery: 2 LPM  Weight: 0 lbs 0 oz     Case reviewed with the ER Provider, EHR reviewed; patient seen in ER room 5    Vital signs:  Temp: 97.5  F (36.4  C) Temp src: Tympanic BP: 163/66 Pulse: 95   Resp: 18 SpO2: 99 % O2 Device: None (Room air) Oxygen Delivery: 2 LPM      Estimated body mass index is 32.84 kg/m  as calculated from the following:    Height as of 11/6/18: 1.727 m (5' 8\").    Weight as of 11/6/18: 98 kg (216 lb).      General: some distress with knee and back pain, interactive  Head: normocephalic, no obvious trauma  Eyes: Gaze directed normally, sclera clear, no discharge, no abnormal ocular movements  Ears: Normal appearing age-related external ears, no discharge, stable hearing acuity loss  Nose: Normal age-related appearance  Mouth: Normal appearing oral mucosa, Gums and throat appear age-related normal  Neck: Normal age-related appearance, age-related range of motion, supple, no adenopathy  Pulmonary: Normal work of breathing, some expiratory delay, no coarseness, no wheezing  Cardiovascular: Distant heart sounds, regular rhythm   Abdomen: No obvious distention, soft, bowel sounds present with normal frequency and pitch  Rectal: Deferred  Back: discomfort with movement prevented the exam  Skin: Age-related normal, no rashes  Extremities: Not " tender, no lower extremity edema. Moving upper and lower extremities  Neurological: Grossly in tact  Psychiatric: Mood is stable, appropriately interactive          Data   Data reviewed today: I reviewed all medications, new labs and imaging results over the last 24 hours

## 2021-01-19 NOTE — PLAN OF CARE
[facility] Inpatient Admission Note:    Patient admitted to 3110/3110-1 at approximately 0510 via cart accompanied by nursePatient transferred to bed via slide board.. Patient is alert and oriented X 1, denies pain; rates at 0 on 0-10 scale.  Patient oriented to room, unit, hourly rounding, and plan of care. Explained admission packet and patient handbook with patient bill of rights brochure. Will continue to monitor and document as needed.     Inpatient Nursing criteria listed below was met:    Health care directives status obtained and documented: No    Care Everywhere authorization obtained No    MRSA swab completed for patient 65 years and older: N/A    Patient identifies a surrogate decision maker: No If yes, who:n/a Contact Information:RAQUEL     If initial lactic acid >2.0, repeat lactic acid drawn within one hour of arrival to unit: NA. If no, state reason: n/a    Vaccination assessment and education completed: Yes   Vaccinations received prior to admission: Pneumovax yes  Influenza(seasonal)  NO   Vaccination(s) ordered: [facility]    Clergy visit ordered if patient requests: N/A    Skin issues/needs documented: Yes    Isolation Patient: no Education given, correct sign in place and documentation row added to PCS:  No    Fall Prevention Yes: Care plan updated, education given and documented, sticker and magnet in place: Yes    Care Plan initiated: Yes    Education Documented (including assessment): Yes    Patient has discharge needs : Yes If yes, please explain:possible placement

## 2021-01-19 NOTE — ED NOTES
Awaiting call back from hospitalist. Disposition set to admit but patient has not been accepted by hospitalist yet. Admission delayed until acceptance from hospitalist.

## 2021-01-19 NOTE — ED NOTES
"Pt states she was drinking yesterday, states she drinks everyday. Denies any drug use. Pt states she drinks beer: usually and or at  least 6 a day. She is not sure when last drink was yesterday. Pt states she has gone through withdrawals, \"I lay in bed, cold and sick\".   "

## 2021-01-19 NOTE — PLAN OF CARE
BP (!) 147/47   Pulse 89   Temp 98.2  F (36.8  C) (Tympanic)   Resp 18   Wt 93.1 kg (205 lb 4 oz)   SpO2 96%   BMI 31.21 kg/m       Orientated x3. Pt very lethargic, slept through most of shift. Does not make needs known at this time, bed alarm present and audible. Pt ate 50% of lunch, tolerated well. Macerated skin to buttocks and inner thighs due to frequent incontinence. Frequent incontinence pain at bilateral hips. Tylenol given at 0955, effective. Voltaren gel applied scheduled. Assessment as charted. No other significant events.

## 2021-01-19 NOTE — ED NOTES
"Pt presents via EMS, alert and orientated, clothing soaked in urine. Pt states she fell at, what she thought was 6 pm tonight and she laid On her floor until now. Pt is wearing a fall alert wrist band and states she thinks she pushed it but unsure.   Pt is having 10/10 RT hip pain, no shortening or rotation, CMS intact and pt is able having full flexion and extension, but screams out in pain when she is rolling herself over back and forth in bed.   Pt states she has \"bad knees\" and her leg \"just gave out\". Denies any dizziness, LOC, head trauma, fevers, diarrhea.  Pt changed and bed bath done: pt has angry red rash at buttocks and karmen area. Pt states she is unaware of any rashes.   "

## 2021-01-20 NOTE — PLAN OF CARE
Most recent vitals: /62   Pulse 76   Temp 99.4  F (37.4  C) (Tympanic)   Resp 20   Wt 93.1 kg (205 lb 4 oz)   SpO2 97%   BMI 31.21 kg/m      Patient is confused and anxious. Patient has mild to moderate tremors during the shift. Continuously tries to get out of bed. VSS. Denies pain. CIWA scale score at beginning of shift was 5. Around 2000 CIWA score was 12. From then on CIWAs were from 12-16. Ativan given x4 for a total of 6 mg from 1800 to 2148. Incontinent of urine at times. Blanchable redness and rash to buttocks, posterior thighs and perineum. IV was originally in her left AC but was leaking. Anesthesia was called to restart. New IV in her right AC with NS running at 125 ml/hr. Assist of x1-2 to commode. Tolerating diet. Alarms active. Call light and personal belongings in reach. Sitter at bedside.      Face to face report given with opportunity to observe patient.    Report given to Edmond Gardner RN   1/19/2021  11:22 PM

## 2021-01-20 NOTE — PROGRESS NOTES
[facility]    Medicine Progress Note - Hospitalist Service       Date of Admission:  1/19/2021  Assessment & Plan       Jimena Mensah is a 65 year old female admitted on 1/19/2021.      ETOH intoxication, withdrawal syndrome:   No appears to be going into severe alcohol withdrawal with agitation and hallucination.  We will continue with CIWA protocol and benzodiazepine.    Hyponatremia (123):   Resolved with normal saline hydration    Rhabdomyolysis (CK 3051):   Patient's serum CK level is coming down nicely with IV hydration.    Her renal function has remained stable as well.    Associated fall:   PT/OT evaluation when patient is more awake    Associated acute on chronic bilateral knee, right hip and lower back pain:   trial Gabapentin, Voltaren Gel, PT/OT    Chronic hypoxic respiratory failure:   currently at baseline with 2 LPM NC    Tobacco dependence:   topical Nicotine available (yes, she smokes and uses oxygen.)       Diet: Combination Diet Low Saturated Fat Na <2400mg Diet    DVT Prophylaxis: Pneumatic Compression Devices  Ashley Catheter: not present  Code Status: Full Code           Disposition Plan   Expected discharge: 2 - 3 days, recommended to prior living arrangement once mental status at baseline and Alcohol withdrawal resolves.  Entered: Tomasz Onofre MD 01/20/2021, 9:46 AM     The patient's care was discussed with the Bedside Nurse.    Tomasz Onofre MD  Hospitalist Service  [facility]  Contact information available via [facility] Paging/Directory    ______________________________________________________________________    Interval History   Patient had an episode of agitation last night and CIWA protocol was started.  She received diazepam and lorazepam, and currently she is sleeping.    Data reviewed today: I reviewed all medications, new labs and imaging results over the last 24 hours.    Physical Exam   Vital Signs: Temp: 96.1  F (35.6  C) Temp src: Tympanic BP: 120/56 Pulse:  64   Resp: 14 SpO2: 95 % O2 Device: Nasal cannula Oxygen Delivery: 2 LPM  Weight: 206 lbs 2.08 oz  General Appearance: Lying in bed in no acute distress sleeping  Respiratory: Clear to auscultation bilaterally  Cardiovascular: S1-S2, regular rhythm and rate, no murmurs rubs or gallops  GI: Obese, soft, nontender, nondistended  Skin: Intact  Other: Neuro: Grossly nonfocal    Data   Recent Labs   Lab 01/20/21  0517 01/19/21  1119 01/19/21  0800 01/19/21  0303   WBC 6.5  --  11.8* 15.2*   HGB 12.8  --  14.4 15.9*   MCV 90  --  86 86     --  197 243   INR  --   --   --  0.93    129* 128* 123*   POTASSIUM 3.9  --  4.6 4.7   CHLORIDE 103  --  93* 87*   CO2 27  --  23 22   BUN 19  --  9 10   CR 0.59  --  0.50* 0.53   ANIONGAP 5  --  12 14   SHARON 8.3*  --  8.1* 8.9   GLC 90  --  70 86   ALBUMIN 2.8*  --   --  4.0   PROTTOTAL 5.8*  --   --  7.8   BILITOTAL 1.1  --   --  0.8   ALKPHOS 111  --   --  107   ALT 64*  --   --  62*   *  --   --  110*   TROPI  --   --  0.035 0.026     No results found for this or any previous visit (from the past 24 hour(s)).  Medications     sodium chloride 125 mL/hr at 01/20/21 0826       citalopram  40 mg Oral Daily     diazepam  5 mg Oral TID     diclofenac  4 g Topical 4x Daily     diclofenac  75 mg Oral BID    And     misoprostol  200 mcg Oral BID     folic acid  1 mg Oral Daily     gabapentin  200 mg Oral TID     levothyroxine  112 mcg Oral QAM AC     multivitamin w/minerals  1 tablet Oral Daily     nicotine   Transdermal Q8H     olopatadine  1 drop Both Eyes BID     sodium chloride (PF)  3 mL Intracatheter Q8H     thiamine  100 mg Oral Daily     traZODone  50 mg Oral At Bedtime

## 2021-01-20 NOTE — PLAN OF CARE
/57 (BP Location: Left arm)   Pulse 73   Temp 97.2  F (36.2  C) (Tympanic)   Resp 20   Wt 93.5 kg (206 lb 2.1 oz)   SpO2 97%   BMI 31.34 kg/m      NS running at 125 mL/hr. Vitals as charted. Pt sedated/sleeping for all of shift. Would minimally arouse to voice momentarily, but then falls right back asleep. MD notified with no concerns stated dt pt receiving 5 mg valium and 6 mg ativan on afternoons. Unable to complete CIWAs dt this. Remains on 2 LPM satting 97%. LSs clear, equal bilaterally. Trace edema to ankles. Pt's brief remained dry during the night. Rash to groin area open to air. CK this AM of 3005. Bed in lowest position. Call light in reach. ID band in place. Sitter at bedside. Room near unit station.     Face to face report given with opportunity to observe patient.    Report given to Samuel Contreras RN   1/20/2021  7:15 AM

## 2021-01-20 NOTE — PROGRESS NOTES
Inpatient Physical Therapy Evaluation    Name: Jimena Mensah MRN# [id number]   Age: 65 year old YOB: 1955     Date of Consultation: 2021  Consultation is requested by:  Dr. Adair  Specific Consultation Request:  Assess needs  Primary care provider: Chantell Cook    General Information:   Onset Date: 2021    History of Current Problem/Admission: Alcohol abuse [F10.10]  Hyponatremia [E87.1]  Non-traumatic rhabdomyolysis [M62.82]  Leukocytosis, unspecified type [D72.829]    Prior Level of Function:  Pt is somewhat of a poor historian, unable to provide accurate history despite repeated questioning.  Pt states she uses a w/c in her apartment but also states she uses a walker and has a SEC that she uses outside her apartment but changes story frequently.  Pt reports she has 11hrs of PCA per week and they assist with laundry, bathing, shopping, etc.  Pt states she can dress herself but gets assist with dressing as well.  Pt states she does not drive, reports she uses [employer] for transportation.    Ambulation: 1 - Assistive Equipment   Transferrin - Assistive Equipment   Toiletin - Assistive Equipment  Bathin - Assistive Person   Dressin - Independent   Eatin - Independent   Communication: 0 - Understands/communicates without difficulty  Swallowin - swallows foods/liquids without difficulty  Cognition: unknown    Fall history within the last 6 months: Yes, but is unable to report how many she has had    Current Living Situation: Patient reports she lives at [facility] and lives alone.      Current Equipment Used at Home: power w/c, FWW, SEC, home O2    Patient & Family Goals: unable to verbalize appropriate goal, besides wanting to eat    Past Medical History:   Past Medical History:   Diagnosis Date     Alcoholism (H)      Allergic rhinitis, cause unspecified 2012     Arthritis      Follow-up examination, following other surgery  2012     Hallucinations 10/23/2008     Hypertension      Mixed hearing loss, unspecified 2012     Sensorineural hearing loss, unspecified 2012     Thyroid disease        Past Surgical History:  Past Surgical History:   Procedure Laterality Date     bleeding,fibroids      NOBLE/BSO     cancer      bowel resection;      CARDIAC SURGERY      angiogram      delivery x 2       COLONOSCOPY       COLONOSCOPY  6/3/2013    Procedure: COLONOSCOPY;  COLONSCOPY WITH POSSIBLE BIOPSY;  Surgeon: Yudy Kamara DO;  Location: [facility]     COLONOSCOPY N/A 10/23/2014    Procedure: COLONOSCOPY;  Surgeon: Jordan Stephenson MD;  Location: [facility]     COLONOSCOPY - HIM SCAN  2006    Colonoscopy/flex diagnostic 2006     COLONOSCOPY - HIM SCAN  2008    Colonoscopy/flex diagnostic     COLONOSCOPY - HIM SCAN  2004    Colonoscopy with bx-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 3/24/2004     COLONOSCOPY - HIM SCAN  2005    Colonoscopy with bx-mild inflammatory change consistent with anastomotic site 2005     GYN SURGERY      hysterectomy     PHACOEMULSIFICATION WITH STANDARD INTRAOCULAR LENS IMPLANT Left 10/16/2018    Procedure: CATARACT EXTRACTION LEFT EYE WITH IMPLANT;  Surgeon: Jamarcus Pham MD;  Location: [facility]     PHACOEMULSIFICATION WITH STANDARD INTRAOCULAR LENS IMPLANT Right 2018    Procedure: RIGHT CATARACT EXTRACTION WITH IMPLANT;  Surgeon: Jamarcus Pham MD;  Location: [facility]     Rotator cuff repair LT       sigmoid Diverticulitis      colonoscopy with polypectomy; 3/22/2010; Provider GERALD CHIAYANG      sinus surgery x 2       SOFT TISSUE SURGERY      lesion removal     TYMPANOMASTOIDECTOMY      RT       Medications:   Current Facility-Administered Medications   Medication     acetaminophen (TYLENOL) tablet 650 mg     albuterol (PROAIR HFA/PROVENTIL HFA/VENTOLIN HFA) 108 (90 Base) MCG/ACT inhaler 2 puff      calcium carbonate (TUMS) chewable tablet 1,000 mg     citalopram (celeXA) tablet 40 mg     diazepam (VALIUM) tablet 5 mg     diclofenac (VOLTAREN) 1 % topical gel 4 g     diclofenac (VOLTAREN) EC tablet 75 mg    And     misoprostol (CYTOTEC) tablet 200 mcg     folic acid (FOLVITE) tablet 1 mg     gabapentin (NEURONTIN) capsule 200 mg     levothyroxine (SYNTHROID/LEVOTHROID) tablet 112 mcg     lidocaine (LMX4) kit     lidocaine 1 % 0.1-1 mL     LORazepam (ATIVAN) tablet 1-2 mg     magnesium hydroxide (MILK OF MAGNESIA) suspension 30 mL     melatonin tablet 3 mg     multivitamin w/minerals (THERA-VIT-M) tablet 1 tablet     nicotine (NICODERM CQ) 14 MG/24HR 24 hr patch 1 patch     nicotine Patch in Place     olopatadine (PATANOL) 0.1 % ophthalmic solution 1 drop     ondansetron (ZOFRAN-ODT) ODT tab 4 mg    Or     ondansetron (ZOFRAN) injection 4 mg     prochlorperazine (COMPAZINE) injection 5 mg    Or     prochlorperazine (COMPAZINE) tablet 5 mg    Or     prochlorperazine (COMPAZINE) suppository 12.5 mg     senna-docusate (SENOKOT-S/PERICOLACE) 8.6-50 MG per tablet 1 tablet    Or     senna-docusate (SENOKOT-S/PERICOLACE) 8.6-50 MG per tablet 2 tablet     sodium chloride (PF) 0.9% PF flush 3 mL     sodium chloride (PF) 0.9% PF flush 3 mL     sodium chloride 0.9% infusion     thiamine tablet 100 mg     traZODone (DESYREL) tablet 50 mg       Weight Bearing Status: FWB LEs     Cognitive Status Examination:  Orientation: oriented to time, place and person  Level of Consciousness: alert  Follows Commands and Answers Questions: 50% of the time  Personal Safety and Judgement: Impaired  Memory: impaired, unable to provide prior level of function  Comments: at times pt's conversation is nonsensical and difficult to follow.    Pain:   Currently in pain? Yes  Pain Location? Right hip  Pain Rating: initially unable to provide a response but once in chair states 10/10 but does not exhibit that level of pain behaviors.    Physical  Therapy Evaluation:   Integumentary/Edema: bruising noted on knees  ROM: LE AROM WFL  Strength: unable to MMT due to difficulty following commands  Bed Mobility: sup>sit SBA however pt raises HOB fully before attempting to get out  Transfers: sit>stand mod A  Gait: able to take 6-7 steps from bed to chair with mod A of 1-2, pt tremulous in standing and having difficulty advancing feet to take steps.  Stairs: NT  Balance: fair with support from FWW  Sensory: NT  Coordination: NT    Physical Therapy Interventions: Balance, Bed Mobility, Gait Training , Neuro-muscular re-education, Strengthening, Transfer Training, Risk Factor Education and Progressive Activity/Exercise     Clinical Impressions:  Criteria for Skilled Therapeutic Intervention Met: Yes, treatment indicated  PT Diagnosis: gait disturbance  Influenced by the following impairments: weakness, impaired balance, pain, decreased cognition, ETOH with drawals  Functional limitations due to impairment: decreased safety and ability to complete bed mobility, transfers, and ambulation, high fall risk  Clinical presentation: Evolving/Changing  Clinical presentation rationale: clinical judgement  Clinical Decision making (complexity): Moderate Complexity  Frequency: 1-2x/day, 5-6x/week  Predicted Duration of Therapy Intervention (days/wks): 5 days  Anticipated Discharge Disposition: Transitional Care Facility based on today's level of function however with pt going through withdrawals, will need ongoing assessment to gain true level of function  Anticipated Equipment Needs at Discharge:   Risks and Benefits of therapy have been explained: Yes  Patient, Family & other staff in agreement with plan of care: Yes  Clinical Impression Comments: Pt currently demonstrating weakness, impaired balance, pain, and tremors limiting her safety and ability to carry out functional mobility safely.  Pt currently going through ETOH withdrawals so this largely influencing her overall  mobility and may see mobility improve significantly as this clears.  Based on today's function, pt will require short term rehab but this could change before discharge, will need to be continuously assessed.  Pt will be seen for skilled PT services during acute care stay.      Total Eval Time: 18

## 2021-01-20 NOTE — PROGRESS NOTES
01/20/21 1300   Signing Clinician's Name / Credentials   Signing clinician's name / credentials Celi Abreu, OTR/L   Quick Adds   Rehab Discipline OT   OT Discharge Planning    OT Discharge Recommendation (DC Rec) Transitional Care Facility  (based off of today's assessment)   OT Rationale for DC Rec Pt presents with impaired balance,  impaired cognition, weakness, decreased activity tolerance, which is limiting her safety and independence in ADLs/IADLs. She also is currently going through ETOH withdrawals, so this is greatly affecting her ability to participate in self-cares. Based off of pt's current level of performance today, she would not be safe to return home and would benefit from short term rehab. However, this may change once pt is done withdrawing. Plan to work with pt to progress functional status, as able, and continue making discharge recommendations.   OT Brief overview of current status  Pt transferred supine to sit EOB with moderate difficulties but SBA with HOB raised by patient. She transferred sit to stand with modA. Pt transferred bed to chair about 6 steps away with modA x1-2. She was unable to doff her socks today and would require maxA. Unable to assess any further self-cares as her breakfast tray was present and pt wanted to eat.   Additional Documentation   Rehab Comments Assessment limited today due to pt going through ETOH withdrawals but she currently presents with weakness, impaired balance, decreased cognition, and decreased activity tolerance.   OT Plan Progress functional status, as able

## 2021-01-20 NOTE — PROGRESS NOTES
"Inpatient Occupational Therapy Evaluation    Name: Jimena Mensah MRN# [id number]   Age: 65 year old YOB: 1955     Date of Consultation: 2021  Consultation is requested by: Dr. Adair  Specific Consultation Request: assess needs  Primary care provider: Chantell Cook    General Information:   Onset Date: 2021    History of Current Problem/Admission: Alcohol abuse [F10.10]  Hyponatremia [E87.1]  Non-traumatic rhabdomyolysis [M62.82]  Leukocytosis, unspecified type [D72.829]    Prior Level of Function: Pt somewhat vague/provided poor information about her PLOF. She also became more irritated with ongoing questions. When asked about dressing and toileting, she states her PCA \"can\" help her. It sounds like her PCA does assist with bathing. Pt reports using a walker and wheelchair inside her apartment, but a cane outside of her apartment. Pt states she has a PCA 11 hours/week; see below for more details on PCA's help.   Ambulation: 1 - Assistive Equipment   Transferrin - Assistive Equipment   Toiletin - Independent    Bathin - Assistive Person   Dressin - Independent   Eatin - Independent   Communication: 0 - Understands/communicates without difficulty  Swallowin - swallows foods/liquids without difficulty  Cognition: unknown    IADLs:   Previous Responsibilities of the Patient: Meal Prep, Medication Management, and Finances   Comments: Pt does very simple meal prep. Pt states she does not have a car but can drive. Her PCA performs the housekeeping, laundry, shopping. She states she uses [employer] for transportation.      Fall history within the last 6 months: Yes - pt did not report how many     Current Living Situation: Pt lives alone at the [facility]. Do not know any further details about environment such as bathroom set up; did not ask today due to irritability about questions on PLOF.     Current Equipment Used at Home: Walker, cane, home " O2, power wheelchair     Patient & Family Goals: Unable to verbalize at this time     Past Medical History:   Past Medical History:   Diagnosis Date     Alcoholism (H)      Allergic rhinitis, cause unspecified 2012     Arthritis      Follow-up examination, following other surgery 2012     Hallucinations 10/23/2008     Hypertension      Mixed hearing loss, unspecified 2012     Sensorineural hearing loss, unspecified 2012     Thyroid disease        Past Surgical History:  Past Surgical History:   Procedure Laterality Date     bleeding,fibroids      NOBLE/BSO     cancer      bowel resection;      CARDIAC SURGERY      angiogram      delivery x 2       COLONOSCOPY       COLONOSCOPY  6/3/2013    Procedure: COLONOSCOPY;  COLONSCOPY WITH POSSIBLE BIOPSY;  Surgeon: Yudy Kamara DO;  Location: [facility]     COLONOSCOPY N/A 10/23/2014    Procedure: COLONOSCOPY;  Surgeon: Jordan Stephenson MD;  Location: [facility]     COLONOSCOPY - HIM SCAN  2006    Colonoscopy/flex diagnostic 2006     COLONOSCOPY - HIM SCAN  2008    Colonoscopy/flex diagnostic     COLONOSCOPY - HIM SCAN  2004    Colonoscopy with bx-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 3/24/2004     COLONOSCOPY - HIM SCAN  2005    Colonoscopy with bx-mild inflammatory change consistent with anastomotic site 2005     GYN SURGERY      hysterectomy     PHACOEMULSIFICATION WITH STANDARD INTRAOCULAR LENS IMPLANT Left 10/16/2018    Procedure: CATARACT EXTRACTION LEFT EYE WITH IMPLANT;  Surgeon: Jamarcus Pham MD;  Location: [facility]     PHACOEMULSIFICATION WITH STANDARD INTRAOCULAR LENS IMPLANT Right 2018    Procedure: RIGHT CATARACT EXTRACTION WITH IMPLANT;  Surgeon: Jamarcus Pham MD;  Location: [facility] OR     Rotator cuff repair LT       sigmoid Diverticulitis      colonoscopy with polypectomy; 3/22/2010; Provider [facility]      sinus surgery x 2  2000     SOFT  TISSUE SURGERY      lesion removal     TYMPANOMASTOIDECTOMY  2011    RT       Medications:   Current Facility-Administered Medications   Medication     acetaminophen (TYLENOL) tablet 650 mg     albuterol (PROAIR HFA/PROVENTIL HFA/VENTOLIN HFA) 108 (90 Base) MCG/ACT inhaler 2 puff     calcium carbonate (TUMS) chewable tablet 1,000 mg     citalopram (celeXA) tablet 40 mg     diazepam (VALIUM) tablet 5 mg     diclofenac (VOLTAREN) 1 % topical gel 4 g     diclofenac (VOLTAREN) EC tablet 75 mg    And     misoprostol (CYTOTEC) tablet 200 mcg     folic acid (FOLVITE) tablet 1 mg     gabapentin (NEURONTIN) capsule 200 mg     levothyroxine (SYNTHROID/LEVOTHROID) tablet 112 mcg     lidocaine (LMX4) kit     lidocaine 1 % 0.1-1 mL     LORazepam (ATIVAN) tablet 1-2 mg     magnesium hydroxide (MILK OF MAGNESIA) suspension 30 mL     melatonin tablet 3 mg     multivitamin w/minerals (THERA-VIT-M) tablet 1 tablet     nicotine (NICODERM CQ) 14 MG/24HR 24 hr patch 1 patch     nicotine Patch in Place     olopatadine (PATANOL) 0.1 % ophthalmic solution 1 drop     ondansetron (ZOFRAN-ODT) ODT tab 4 mg    Or     ondansetron (ZOFRAN) injection 4 mg     prochlorperazine (COMPAZINE) injection 5 mg    Or     prochlorperazine (COMPAZINE) tablet 5 mg    Or     prochlorperazine (COMPAZINE) suppository 12.5 mg     senna-docusate (SENOKOT-S/PERICOLACE) 8.6-50 MG per tablet 1 tablet    Or     senna-docusate (SENOKOT-S/PERICOLACE) 8.6-50 MG per tablet 2 tablet     sodium chloride (PF) 0.9% PF flush 3 mL     sodium chloride (PF) 0.9% PF flush 3 mL     sodium chloride 0.9% infusion     thiamine tablet 100 mg     traZODone (DESYREL) tablet 50 mg       Weight Bearing Status:     Cognitive Status Examination:  Orientation: oriented to time, place and person  Level of Consciousness: alert but irritable  Follows Commands and Answers Questions: 50% of the time  Personal Safety and Judgement: Impaired and Impulsive  Memory: Impaired  Comments: Despite  repetitive questioning, pt did not provide detailed information about PLOF and home environment. Pt seemed mostly focused on getting to the chair to eat her food that just came. She attempted to stand up prior to being instructed to/finishing answering questions. Towards the end of the assessment, pt commented on how she was irritated with all the questions being asked. At times, pt was hard to follow in conversation.     Pain:   Currently in pain? Yes  Pain Location? Bilateral hips  Pain Rating: initially did not rate. After activity, pt asked again and stated 10    Occupational Therapy Evaluation:   Integumentary/Edema: bruising on knees  Range of Motion: BUTOMÁS's appeared WFL, unable to fully assess due to difficulties following commands/answering questions   Strength: Unable to perform MMT due to difficulties following commands/answering questions   Hand Strength: unable to assess today  Muscle Tone Assessment: no issues observed  Coordination: Lola tremulous     Mobility:   Bed Mobility: Pt transferred supine to sit EOB with moderate difficulties but SBA with HOB raised by patient  Transfer Skills: Pt transferred sit to stand with modA  Bed to Chair/Chair to Bed: Pt transferred bed to chair about 6 steps away with modA x1-2  Toilet Transfer: Unable to assess today   Balance: Impaired     ADLs:   Lower Body Dressing: Pt unable to doff socks; would require maxA  Toileting: Pt did not have to go when OT was present  Grooming: Unable to assess today    Activities of Daily Living Analysis:   Impairments Contributing to Impaired Activities of Daily Living: Balance impaired, cognition impaired, strength decreased, activity tolerance, decreased, pain, ETOH withdrawals     Occupational Therapy Interventions: ADL Retraining, cognition, strengthening, progressive activity/exercise, and risk factor education     Clinical Impressions:  Criteria for Skilled Therapeutic Intervention Met: Yes, treatment indicated  OT Diagnosis:  Impaired ADLs  Influenced by the following impairments: Impaired balance, impaired cognition, weakness, decreased activity tolerance, pain, and ETOH withdrawals   Functional limitations due to impairment: Dressing, grooming, bathing, toileting, functional mobility, meal prep, financial management, and medication management  Clinical presentation: Evolving/Changing  Clinical presentation rationale: Clinical judgement  Clinical Decision making (complexity): Moderate Complexity  Frequency: 5 times/week  Predicted Duration of Therapy Intervention (days/wks): 5 days    Anticipated Discharge Disposition: Transitional Care Facility   Anticipated Equipment Needs at Discharge: TBD  Risks and Benefits of therapy have been explained: Yes  Patient, Family & other staff in agreement with plan of care: Yes  Clinical Impression Comments: Pt presents with OT orders to assess needs after admission for alcohol abuse, hyponatremia, non-traumatic rhabdomyolysis, and leukocytosis. Currently, pt presents with impaired balance,  impaired cognition, weakness, decreased activity tolerance, which is limiting her safety and independence in ADLs/IADLs. She also is currently going through ETOH withdrawals, so this is greatly affecting her ability to participate in self-cares. Based off of pt's current level of performance today, she would not be safe to return home and would benefit from short term rehab. However, this may change once pt is done withdrawing. Plan to work with pt to progress functional status, as able, and continue making discharge recommendations.     Total Eval Time: 15

## 2021-01-20 NOTE — PLAN OF CARE
Face to face report given with opportunity to observe patient.    Report given to Rebecca España RN   1/20/2021  3:18 PM

## 2021-01-20 NOTE — PLAN OF CARE
/61   Pulse 84   Temp 99  F (37.2  C) (Tympanic)   Resp 16   Wt 93.5 kg (206 lb 2.1 oz)   SpO2 (!) 91%   BMI 31.34 kg/m       VSS. Pt is alert and orientated x3 at end of shift. Morning meds given late due to patient being sedated and only aroused to repeated stimulated for a short period of time. CIWA at  0857: 1. CIWA at 1217: 1. No PRN ativan needed. Pt refusing O2. O2 sats holding 91-92% on RA at this time. Redness to buttocks and coccyx continues to be blanchable. Covered with barrier cream t/o shift. Up with assist of one to the commode. Chair cushion obtained from [employer] for her chair.

## 2021-01-20 NOTE — PROGRESS NOTES
01/20/21 1158   Signing Clinician's Name / Credentials   Signing clinician's name / credentials Aniya Cordova DPT   Quick Adds   Rehab Discipline PT   PT Discharge Planning    PT Discharge Recommendation (DC Rec) Transitional Care Facility  (but day to day assessment warranted)   PT Rationale for DC Rec Pt currently demonstrating weakness, impaired balance, pain, and tremors limiting her safety and ability to carry out functional mobility safely.  Pt currently going through ETOH withdrawals so this largely influencing her overall mobility and may see mobility improve significantly as this clears.  Based on today's function, pt will require short term rehab but this could change before discharge, will need to be continuously assessed.  Pt will be seen for skilled PT services during acute care stay.     PT Brief overview of current status  sup>sit SBA with HOB fully elevated, sit<>stand mod A, bed>chair with FWW mod A of 1-2 with pt tremulous and taking shuffled steps, difficulty clearing feet, complains of pain.  Pt confused at times and has difficulty providing accurate prior level of function.   Additional Documentation   Rehab Comments poor activity tolerance today   PT Plan Progress mobility

## 2021-01-20 NOTE — PLAN OF CARE
Order for PT evaluation addressed.  Per 1:1 and RN, unable to wake patient for meaningful participation, received ativan last night due to pt actively going through withdrawals at this time.  Will attempt PT eval again as able.

## 2021-01-20 NOTE — PROGRESS NOTES
Face to face report given with opportunity to observe patient.    Report given to Bryleigh Dolma Choezom   1/19/2021  7:43 PM

## 2021-01-20 NOTE — ANESTHESIA PREPROCEDURE EVALUATION
Anesthesia Pre-Procedure Evaluation    Patient: Jimena Mensah   MRN: [id number] : 1955        Preoperative Diagnosis: * No pre-op diagnosis entered *   Procedure : * No procedures listed *     Past Medical History:   Diagnosis Date     Alcoholism (H)      Allergic rhinitis, cause unspecified 2012     Arthritis      Follow-up examination, following other surgery 2012     Hallucinations 10/23/2008     Hypertension      Mixed hearing loss, unspecified 2012     Sensorineural hearing loss, unspecified 2012     Thyroid disease       Past Surgical History:   Procedure Laterality Date     bleeding,fibroids      NOBLE/BSO     cancer      bowel resection;      CARDIAC SURGERY      angiogram      delivery x 2       COLONOSCOPY       COLONOSCOPY  6/3/2013    Procedure: COLONOSCOPY;  COLONSCOPY WITH POSSIBLE BIOPSY;  Surgeon: Yudy Kamara DO;  Location: [facility]     COLONOSCOPY N/A 10/23/2014    Procedure: COLONOSCOPY;  Surgeon: Jordan Stephenson MD;  Location: [facility]     COLONOSCOPY - HIM SCAN  2006    Colonoscopy/flex diagnostic 2006     COLONOSCOPY - HIM SCAN  2008    Colonoscopy/flex diagnostic     COLONOSCOPY - HIM SCAN  2004    Colonoscopy with bx-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 3/24/2004     COLONOSCOPY - HIM SCAN  2005    Colonoscopy with bx-mild inflammatory change consistent with anastomotic site 2005     GYN SURGERY      hysterectomy     PHACOEMULSIFICATION WITH STANDARD INTRAOCULAR LENS IMPLANT Left 10/16/2018    Procedure: CATARACT EXTRACTION LEFT EYE WITH IMPLANT;  Surgeon: Jamarcus Pham MD;  Location: [facility]     PHACOEMULSIFICATION WITH STANDARD INTRAOCULAR LENS IMPLANT Right 2018    Procedure: RIGHT CATARACT EXTRACTION WITH IMPLANT;  Surgeon: Jamarcus Pham MD;  Location: [facility]     Rotator cuff repair LT       sigmoid Diverticulitis      colonoscopy with polypectomy;  S/w pt and gave Dr. Cheatham's message below.  Pt states she is going to call [employer] and speak with them.  She is also going to upload a picture of the invoice she received from [employer] in a my chart message and send to Dr. Cheatham.    Brook NEWMAN RN  EP Triage       "3/22/2010; Provider GERALD RANGEL      sinus surgery x 2  2000     SOFT TISSUE SURGERY      lesion removal     TYMPANOMASTOIDECTOMY  2011    RT      Allergies   Allergen Reactions     Cats      Other reaction(s): *Unknown  Cat/feline product derivatives  Fur-animal dander     Dust Mite Extract      Other reaction(s): *Unknown  Trees, pollen, ragweed     Dust Mites      House dust     Fluticasone      Other reaction(s): Headache  Other reaction(s): Headache     Fluticasone Propionate Other (See Comments)     Flonase  headaches     Mometasone Other (See Comments)     Other reaction(s): Headache     Mometasone Furoate      Nasonex  Headaches     Ragweeds      Trees       Social History     Tobacco Use     Smoking status: Current Every Day Smoker     Packs/day: 0.50     Years: 30.00     Pack years: 15.00     Types: Cigarettes     Last attempt to quit: 3/23/2016     Years since quittin.8     Smokeless tobacco: Never Used     Tobacco comment: passive exposure: yes   Substance Use Topics     Alcohol use: Yes     Comment: beer, \"way, way a lot\"      Wt Readings from Last 1 Encounters:   21 93.1 kg (205 lb 4 oz)              OUTSIDE LABS:  CBC:   Lab Results   Component Value Date    WBC 11.8 (H) 2021    WBC 15.2 (H) 2021    HGB 14.4 2021    HGB 15.9 (H) 2021    HCT 40.2 2021    HCT 45.2 2021     2021     2021     BMP:   Lab Results   Component Value Date     (L) 2021     (L) 2021    POTASSIUM 4.6 2021    POTASSIUM 4.7 2021    CHLORIDE 93 (L) 2021    CHLORIDE 87 (L) 2021    CO2 23 2021    CO2 22 2021    BUN 9 2021    BUN 10 2021    CR 0.50 (L) 2021    CR 0.53 2021    GLC 70 2021    GLC 86 2021     COAGS:   Lab Results   Component Value Date    INR 0.93 2021     POC: No results found for: BGM, HCG, HCGS  HEPATIC:   Lab Results   Component Value Date    " ALBUMIN 4.0 01/19/2021    PROTTOTAL 7.8 01/19/2021    ALT 62 (H) 01/19/2021     (H) 01/19/2021    ALKPHOS 107 01/19/2021    BILITOTAL 0.8 01/19/2021    TERRANCE 13 01/19/2021     OTHER:   Lab Results   Component Value Date    SHARON 8.1 (L) 01/19/2021    MAG 2.2 05/15/2020    LIPASE 422 (H) 03/31/2013    AMYLASE 67 03/31/2013    TSH 2.72 01/19/2021    CRP 6.8 06/09/2016       Anesthesia Plan   Procedure only, no anesthetic delivered         Consents        Postoperative Care            Rubin Melton, UBALDO CRNA

## 2021-01-20 NOTE — PROGRESS NOTES
Assessment completed by face to face with patient.    LOC: alert and oriented     Dx: Hyponatremia, Non-traumatic rhabdomyolysis  Chronic Disease Management: Alcohol abuse, Colon Cancer Stage III, Bipolar I disorder, Liver Failure, COPD, smoker, HTN, arthritis, Hypothyroidism    Lives with: Alone  Living at:  Home in [place]  Transportation: NO     Primary PCP: Chantell Cook Jessica Rikkola, [facility] care coordination was updated via email.    Insurance:  [facility]  Medicare IM letter reviewed with patient.    Support System:  Non referenced, although pt does have children listed as contacts  Homecare/PCA: pt states that she has a PCA come in 2 days/week (approved for 11 hours/week)  /County Services: pt didn't know, Freda Sherry  : NO      How was the [facility] notification completed: N/A    Health Care Directive: Yes, on File  Primary Health Care Agent: Peter Rhodes/son    Pharmacy: Pt states that she gets medications through [facility]    Meds management: Independent    Adequate Resources for needs (housing, utilities, food/med): Unknown as pt does not answer fully  Household chores: PCA  Work/community/social activity: NO     ADLs: Independent but verbalizes that she gets assistance from PCA  Ambulation:Uses wheeled walker  Falls: reports three in last six months  Nutrition: Not asked  Sleep: Not asked    Equipment used: walker      Oxygen supplier: N/A      Does the supplier have valid oxygen orders: N/A    Mental health: Denies any concerns, then tells this writer that she was in hospital for having a fall due to drinking because pt had increased depression  Substance abuse: Alcoholism, smokes approximately 1/2 ppd of cigarettes but denies any hx of alcohol use  Exposure to violence/abuse: Unclear    Able to Return to Prior Living Arrangements: TBD dependent on hospital course    Choice of Vendor: Not determined at this time    Barriers: Resistive to placement if needed    ZAHIDA:  Low    Plan: TBD

## 2021-01-21 NOTE — PROGRESS NOTES
01/21/21 1440   Signing Clinician's Name / Credentials   Signing clinician's name / credentials Nai Villalobos PTA   Quick Adds   Rehab Discipline PT   PT Assistant Visit Number 1   Therapeutic Activity   Minutes of Treatment 23 minutes   Symptoms Noted During/After Treatment Fatigue   Treatment Detail Pt ambulated was up in the recliner but was willing to walk with PT. FWW minA1 WBOS shuffling weakness in her L/E'S a lot of encouragement and correct hand placement when changing positions. Pt propelled her wheelchair back to the room and wanted to sit and eat her breakfast that was just delivered.    PT Discharge Planning    PT Discharge Recommendation (DC Rec) Transitional Care Facility   PT Rationale for DC Rec Pt currently demonstrating weakness, impaired balance, pain, and tremors limiting her safety and ability to carry out functional mobility safely.  Pt currently going through ETOH withdrawals so this largely influencing her overall mobility and may see mobility improve significantly as this clears.  Based on today's function, pt will require short term rehab but this could change before discharge, will need to be continuously assessed.  Pt will be seen for skilled PT services during acute care stay.     PT Brief overview of current status  sup>sit SBA with HOB fully elevated, sit<>stand mod A, bed>chair with FWW mod A of 1-2 with pt tremulous and taking shuffled steps, difficulty clearing feet, complains of pain.  Pt confused at times and has difficulty providing accurate prior level of function.   Additional Documentation   OT Plan Progress functional status, as able   Total Session Time   Total Session Time (minutes) 23 minutes

## 2021-01-21 NOTE — PLAN OF CARE
/94 (BP Location: Left arm)   Pulse 95   Temp 98  F (36.7  C) (Tympanic)   Resp 22   Wt 93.5 kg (206 lb 2.1 oz)   SpO2 95%   BMI 31.34 kg/m      NS running at 125 mL/hr. Vitals as charted. Remains on 2 LPM Satting 95%. Pain 9/10 to back & hip. Managed w/ PO tylenol. Disoriented to time and situation. Speech started off somewhat illogical, but progressively declined throughout the night. Pt frequently talked about the man and bridesmaid that would come into her room. Pt also asked if her  was cooking in the kitchen because she could hear/smell it. Remained restless during the night and would frequently scratch, pick at her IV site, and throw her legs over the side rails. See provider notification. CIWAs 7, 13, 16. Total 5 mg Ativan, 5 mg valium, and 5 mg Zyprexa x2 (see prior note). BSs hyperactive w/ loose stool x3. Rash remains open to air. Buttocks blanchable and extremely excoriated w/ frosting applied liberally with every brief change. Bed in lowest position. Call light in reach. ID band in place. Makes needs known.     Face to face report given with opportunity to observe patient.    Report given to Rebecca & Holly RNs    Edmond Contreras RN   1/21/2021  8:12 AM

## 2021-01-21 NOTE — PLAN OF CARE
Patient was sleeping this morning therefore OT was not attempted at that time. Patient unavailable shortly after lunch when OT attempted again. The third time, patient was just about to work with PT. OT will attempt again tomorrow.

## 2021-01-21 NOTE — PLAN OF CARE
/84 (BP Location: Left arm)   Pulse 77   Temp 97.6  F (36.4  C) (Tympanic)   Resp 16   Wt 97.6 kg (215 lb 2.7 oz)   SpO2 93%   BMI 32.72 kg/m       Pt sleeping in bed sleeping until 1300. CIWA scores 0-1 t/o shift. Pt is now alert with intermittent confusion. Denies pain. Continues to require 1:1 at bedside. Pt has not had a BM this shift, stool sample not able to be collected. NS running at 125 mL/hr. No other significant events.

## 2021-01-21 NOTE — PLAN OF CARE
"Patient continues to c/o chronic low back pain from \"spinal stenosis\" that radiates to her hips and down her legs. Tylenol PO has been given twice this shift along with scheduled Voltaren PO and topical. She continues to become confused just after the start of the shift. CIWA scores increased. She tries to get up on her own, she forgets where she is. Towards the end of the shift she started to hallucinate. Scheduled Valium PO given at the beginning of the shift then Ativan PO given twice hourly. Scheduled Valium PO given again at bedtime. Ativan PO given again at 2200. Patient is more directable, more steady on her feet and has less tremors this evening than yesterday evening. She has become irritable off and on, her daughter called and she did voice frustration over their conversation to the unit assistant present with her. VSS with the exception of low grade temp the second half of the shift. Oral intake adequate. Patient has voided on commode 3 times this shift and had 2 BM's. She has been continent of urine until later in the shift when she incontinent because she couldn't get up fast enough. Buttocks and periarea continue to be reddened, some maroon color coming out. Frosting applied when she was incontinent, otherwise barrier cream used. She also has some reddened area on her posterior left thigh. Scattered areas on her trunk and legs that she scratched yesterday evening. Patient has not been scratching this shift. She continues to be assist of 2 when she is more confused. Patient did ambulate a short distance in the hallways with walker and gait belt and assist of 2. 1:1 staff present for patient safety. Alarms in place. Call light within reach. IVF continues.    Face to face report given with opportunity to observe patient.    Report given to Edmond DICKERSON.    Rebecca Robbins RN   1/20/2021  11:37 PM      "

## 2021-01-21 NOTE — PROGRESS NOTES
[facility]    Hospitalist Progress Note      Assessment & Plan   Jimena Mensah is a 65 year old female who was admitted on 1/19/2021.      1.  Alcohol dependence with withdrawal: Was quite agitated last 24 hours.  Has been getting.  Appears since midnight only 5 mg orally of the Ativan.  5 mg of diazepam.  She did get a couple doses of IM Zyprexa also.  Currently she is very sedate and sleeping.  Hemodynamically stable.  Is protecting her airway at this time.  Continue to monitor closely.  At this point does not appear to require IV benzodiazepines.  We will see how she does through today.    2.  Hyponatremia: Resolved.  Was 135 yesterday today's values are pending.  Cut back on IV fluids at this time.    3.  Rhabdomyolysis: This is mild.  CK yesterday was 3000.  Urine output is good.    4.  Diarrhea: Several loose stools last evening.  C. difficile was ordered.  The patient has no fever no elevated white count.  Likely not C. difficile.  Unfortunate is not had a stool now since this was ordered.    5.  Chronic hypoxic respiratory failure: Patient has underlying COPD.  Is chronically on 2 L of O2.  She is on 2 L currently.  Sats are adequate.  Protecting her airway.  No wheezing.    6.  Status post fall with lateral knee back and hip pain.  Work-up with CT scan showed no evidence of fractures.  Musculoskeletal pain.        Diet: Combination Diet Low Saturated Fat Na <2400mg Diet  Fluids: Saline 75 cc an hour    DVT Prophylaxis: Pneumatic Compression Devices  Code Status: Full Code    Disposition: Expected discharge in 2-5 days once withdrawal resolves.    Dusty Merida    Interval History   Overnight patient become more agitated.  Trying to climb out of bed not easily redirectable.  Getting oral Ativan oral diazepam and taking his.  Did end up getting a couple doses of IM Zyprexa earlier this morning.  She is currently resting quietly.  Hemodynamically stable.  Protecting airway.  Was sats are  fine.  Lab work is pending.  Has not had any further stools since then multiple episodes earlier today.  She is currently awaiting a sample for C. difficile.  At this point as long she is tolerating the oral medications we will leave her on the floor.  Certainly if she progressively worsens then will require ICU transfer and IV medication initiation.  Her vital signs are relatively stable.  Mild hypertension mild tachycardia.    -Data reviewed today: I reviewed all new labs and imaging results over the last 24 hours. I personally reviewed no images or EKG's today.    Physical Exam   Temp: 98  F (36.7  C) Temp src: Tympanic BP: 159/94 Pulse: 95   Resp: 22 SpO2: 95 % O2 Device: Nasal cannula Oxygen Delivery: 2 LPM  Vitals:    01/19/21 0640 01/20/21 0405 01/21/21 0500   Weight: 93.1 kg (205 lb 4 oz) 93.5 kg (206 lb 2.1 oz) 97.6 kg (215 lb 2.7 oz)     Vital Signs with Ranges  Temp:  [96.1  F (35.6  C)-99.1  F (37.3  C)] 98  F (36.7  C)  Pulse:  [64-95] 95  Resp:  [14-24] 22  BP: (103-159)/(53-94) 159/94  SpO2:  [91 %-96 %] 95 %  I/O last 3 completed shifts:  In: 4007 [P.O.:1660; I.V.:2347]  Out: 600 [Urine:600]    Peripheral IV 01/19/21 Right Upper forearm (Active)   Site Assessment WDL 01/20/21 2339   Line Status Infusing 01/20/21 2339   Phlebitis Scale 0-->no symptoms 01/20/21 2339   Infiltration Scale 0 01/20/21 2339   Number of days: 2       Rash 01/19/21 0510 (Active)   Distribution localized 01/20/21 2339   Borders irregular;diffuse 01/20/21 2339   Characteristics flat 01/20/21 2339   Color pink;red 01/20/21 2339   Care, Rash open to air 01/20/21 2339   Number of days: 2     No line/device    Constitutional: Patient currently sedate no distress     HEENT: Normocephalic/atraumatic, PERRL, EOMI, mouth moist, neck supple, no LN.     Cardiovascular: RRR.  No murmur, no  rubs, or gallops.  JVD no.  Bruits no.  Pulses 3+    Respiratory: Occasional upper airway rhonchi otherwise clear to auscultation  bilaterally    Abdomen: Soft, nontender, nondistended, no organomegaly. Bowel sounds present    Extremities: Warm/dry.  No edema    Neuro:   Non focal, cranial nerves intact, Moves all extremities.  Medications     sodium chloride 125 mL/hr at 01/21/21 0005       citalopram  40 mg Oral Daily     diazepam  5 mg Oral 4x Daily     diclofenac  4 g Topical 4x Daily     diclofenac  75 mg Oral BID    And     misoprostol  200 mcg Oral BID     folic acid  1 mg Oral Daily     gabapentin  200 mg Oral TID     levothyroxine  112 mcg Oral QAM AC     multivitamin w/minerals  1 tablet Oral Daily     nicotine   Transdermal Q8H     olopatadine  1 drop Both Eyes BID     sodium chloride (PF)  3 mL Intracatheter Q8H     thiamine  100 mg Oral Daily     traZODone  50 mg Oral At Bedtime       Data   Recent Labs   Lab 01/20/21  0517 01/19/21  1119 01/19/21  0800 01/19/21  0303   WBC 6.5  --  11.8* 15.2*   HGB 12.8  --  14.4 15.9*   MCV 90  --  86 86     --  197 243   INR  --   --   --  0.93    129* 128* 123*   POTASSIUM 3.9  --  4.6 4.7   CHLORIDE 103  --  93* 87*   CO2 27  --  23 22   BUN 19  --  9 10   CR 0.59  --  0.50* 0.53   ANIONGAP 5  --  12 14   SHARON 8.3*  --  8.1* 8.9   GLC 90  --  70 86   ALBUMIN 2.8*  --   --  4.0   PROTTOTAL 5.8*  --   --  7.8   BILITOTAL 1.1  --   --  0.8   ALKPHOS 111  --   --  107   ALT 64*  --   --  62*   *  --   --  110*   TROPI  --   --  0.035 0.026       No results found for this or any previous visit (from the past 24 hour(s)).

## 2021-01-21 NOTE — PLAN OF CARE
Face to face report given with opportunity to observe patient.    Report given to Jannie España RN   1/21/2021  3:24 PM

## 2021-01-21 NOTE — PROGRESS NOTES
Face to face report given with opportunity to observe patient.    Report given to Rebecca Gautam   1/20/2021  7:42 PM

## 2021-01-21 NOTE — PROGRESS NOTES
"Attempted to visit with pt to inquire about interest in home care services or STR if needed. Pt was not clear with speech at times, appeared groggy.  Pt did give this writer verbal consent to speak with her son/Peter/HCA.  In speaking w/son/Peter, he verbalizes that it has been difficult to interject concerns with pt as pt has \" been an alcoholic most of her life, and is resistive to accepting help from anyone\".  He does tell this writer that himself et his three other siblings live in the area et do stay in contact w/pt regularly. This writer will attempt to meet w/pt again tomorrow.  "

## 2021-01-21 NOTE — PROGRESS NOTES
"Per [facility] CADEN/Freda Powell, pt has homemaker services approved for only 5.5 hours/weekly et PCA services approved for only 3.5 hours/weekly (30 minutes/daily). Total of 9 hours/weekly.  Pt used to receive homestyle delivered meals but Freda/CADEN received a message from them on January 4, 2021 stating that pt had her meals on \"hold\" for more than 90 days so they had to cancel the service.  Per Freda/CADEN, pt has 2 motorized scooters, 2 walkers, w/c's, canes et a life alert. Freda states that pt \"has 2 of everything\". Pt's PCA is serviced through [facility] in [place], (spoke with Anila Leger), per Anila PCA is generally there in the evening 2-3X/weekly.  "

## 2021-01-21 NOTE — PLAN OF CARE
Patient increasing restlessness and agitation this morning. Numerous attempts to get out of bed, grabbing at and pulling at IV. Irritable, difficult to redirect. Multiple staff in room (Primary RN, 1:1 UA, floor UA and Charge Nurse ) attempting to redirect. Patient has already received another dose of Valium, PRN Ativan per MAR (see previous notes). MD was updated by Primary RN. Zyprexa 5 mg ordered by MD and given at 0457 (see MAR). Dr. Adair in to see patient. Patient continues with agitation, restlessness, irritability, swinging legs over side rails and trying to climb out of bed, attempting to pull at IV, occasionally yelling at staff and threatening to hit, hallucinating, picking at air, not able to redirect. Dr. Adair ordered another dose of Zyprexa which was given at 0530 per MAR. Patient also attempting to grab at and pull on staff. Remains a 1:1, room near nurse's station, Charge nurse and Primary nurse assisting as needed.

## 2021-01-22 NOTE — PLAN OF CARE
Pt slept the whole shift. Was up in chair, than transferred to bed by patt lift.  Did not eat supper.  Opens eyes with turning.  No medication given this shift.  Total cares. 1:1 observation.  IV infusing.  Face to face report given with opportunity to observe patient.    Report given to Edmond Hahn RN   1/21/2021  11:21 PM

## 2021-01-22 NOTE — PLAN OF CARE
Pt resting comfortably at this time and has been asleep since 1800. Will continue to let pt rest and will frequently round on. 1:1 remains at bedside.

## 2021-01-22 NOTE — PLAN OF CARE
Patient had just finished working with PT and gotten back into bed. OT attempted again later in the morning and pt was unavailable. OT will attempt again when able.

## 2021-01-22 NOTE — PLAN OF CARE
Jannie (PCA worker) called and wanted to information about Chiquita - also wanted to know if she could bring her items and come see her - asked Chiquita - yes information can be given and yes she would be her designated visiting person.

## 2021-01-22 NOTE — PLAN OF CARE
Pt inquiring about her missing partial - not able to find in room - not documented upon admission - she is planning on calling Jannie her PCA worker to have her look at home prior to coming in for a visit.

## 2021-01-22 NOTE — PROGRESS NOTES
Patient instructed on inhaler with spacer.  Patient needs help with the technique at this time due to some confusion.  Will continue to monitor.

## 2021-01-22 NOTE — PLAN OF CARE
/95 (BP Location: Left arm)   Pulse 73   Temp 97.3  F (36.3  C) (Tympanic)   Resp 18   Wt 97.6 kg (215 lb 2.7 oz)   SpO2 93%   BMI 32.72 kg/m      NS running at 75 mL/hr. Vitals as charted. Remains on 2 LPM satting 93%. Infrequent, congested cough w/ rhonchi to RML & BLLs. No loose stool tonight. CIWAs of 8, 8, 0,  and 3. Total of 2 mg ativan administered along with HS valium administered at 0123 dt not being given on evenings. Behavior has significantly improved tonight with pt being pleasant and cooperative with cares. Rash to buttocks and thighs remains open to air and covered diligently with barrier cream. Incontinent of urine x1 w/ pt also utilizing the bedpan to void. Bed in lowest position. Call light in reach. ID band in place. Makes needs known. Room near unit station. 1:1 at bedside.     Face to face report given with opportunity to observe patient.    Report given to Brenda Contreras RN   1/22/2021  7:01 AM

## 2021-01-22 NOTE — PROGRESS NOTES
01/22/21 1400   Signing Clinician's Name / Credentials   Signing clinician's name / credentials Enma Degroot DPT   Quick Adds   Rehab Discipline PT   Therapeutic Activity   Minutes of Treatment 15 minutes   Symptoms Noted During/After Treatment Fatigue   Treatment Detail Pt supine in bed at start of treatment. She had just gotten into bed but was willing to work with us. Transferred supine to sit with extra time and cueing using bed rail. From edge of bed completed sit to stand transfer c CGA using FWW. Pt ambulated about 75' x2 c FWW and min A for steadying, verbal cues for safety. Pt demonstrates with decreased safety awareness and let go of walker trying to use wall for steadying and nearly lost her balance. Pt also needed assistance finding her room after walk. Distance was limited by fatigue and weakness. Needed cues for safety when transferring back into bed.   PT Discharge Planning    PT Discharge Recommendation (DC Rec) Transitional Care Facility   PT Rationale for DC Rec Pt demonstrates with weakness, decreased activity tolerance, impaired balance and decreased safety awareness putting her at a high risk for falls. Pt would need 24hr assist/supervision at home. Recomming SNF for short term rehab   PT Brief overview of current status  Pt supine in bed at start of treatment. She had just gotten into bed but was willing to work with us. Transferred supine to sit with extra time and cueing using bed rail. From edge of bed completed sit to stand transfer c CGA using FWW. Pt ambulated about 75' x2 c FWW and min A for steadying, verbal cues for safety. Pt demonstrates with decreased safety awareness and let go of walker trying to use wall for steadying and nearly lost her balance. Pt also needed assistance finding her room after walk. Distance was limited by fatigue and weakness. Needed cues for safety when transferring back into bed.   Additional Documentation   PT Plan Progress mobility   Total Session  Time   Total Session Time (minutes) 15 minutes

## 2021-01-22 NOTE — PROGRESS NOTES
Pt was receptive to home care services over STR.  [facility] referral was faxed over today.  Can potentially start services on Monday, 1/25/21.

## 2021-01-22 NOTE — PLAN OF CARE
Pt has been afebrile through the day, VS as charted.  Her O2 sats are in the upper 90's on 2 LPM via NC - O2 removed for the day (chronically uses at night primarily)  Her lung sounds are clear/diminished.  She did rate her pain/discomfort to her spine and hips 9/10 - received her scheduled medications - adequate pain control.  She was saline locked this morning - adequate intake/output. She did have a shower this morning - tolerated well.  Her CIWA scores were 2 and 8 - received a dose of Ativan (increase CIWA after shower and increased stimulation - increase in anxiety/agitation).  She is much more alert and oriented today. She is transferring with assist of 1 - did ambulate in the hale with PT - tolerated well. Attempting to wean 1:1 - pt more stable, approaching baseline.    Face to face report given with opportunity to observe patient.    Report given to Jannie Vazquez RN   1/22/2021  3:32 PM

## 2021-01-22 NOTE — PROGRESS NOTES
01/22/21 1449   Signing Clinician's Name / Credentials   Signing clinician's name / credentials Celi Abreu, OTR/L   Quick Adds   Rehab Discipline OT   Therapeutic Activity   Minutes of Treatment 23 minutes   Symptoms Noted During/After Treatment Fatigue   Treatment Detail Pt sitting in her chair eating upon OT arrival. She was almost finished and was agreeable to OT. Attempted to ask pt more details about her PLOF as during her initial eval, pt was confused and still withdrawing. Pt lives at [facility] with elevator access. She stated she has a walk in shower with a chair but no grab bars in the BR. When asked if pt manages dressing on her own or if her PCA assists, and pt states yes. OT attempted to inquire more but pt then looking through her tray table and states she is looking for her two front teeth (plate ?). OT assisted pt and could not find it anywhere in her room. Pt impulsively attempted to stand up and needed cues to remain seated. Pt stated she thought she had it ambulating in the halls yesterday. Later, she reports brushing it last night or yesterday. OT informed nursing who was going to look in her medical chart to see what she came with. Pt transferred sit to stand from the chair with Fortino. She ambulated in room around her bed with Fortino and was trying to look more into the tray table; pt instructed to sit on the EOB with she completed with Fortino. She then stood and ambulated into the BR using FWW with Fortino. Pt then needed to go to the BR, she transferred on/off the toilet with Fortino and needed ModA for clothing management (briefs). Pt completed karmen-cares with Fortino. Pt needed Fortino with functional mobility in the BR due to safety concerns with turning and not using FWW. Pt stood at the sink and washed her hands with Fortino. Pt returned to the EOB, nearly tripping on the blanket on the chair, and proceeded to ask OT to look in drawers/dresser, closet, tray table, etc. with no success. Pt  again attempting to stand up and OT instructed pt to sit and OT would move everything within easy reach. The bed alarm was activated and her 1:1 outside the room was informed.   OT Discharge Planning    OT Discharge Recommendation (DC Rec) Transitional Care Facility   OT Rationale for DC Rec Pt demonstrates weakness, impaired activity tolerance, decreased cognition (memory, attention), poor safety awareness, impulsivity, and impaired balance. She completed tasks today in OT around her room with Fortino and verbal cues due to safety concerns. Pt is a high fall risk. If pt returns home, strongly recommend 24 hour assist.   OT Brief overview of current status  Pt required at minimum Fortino for the following ADLs: sit<>stand from the chair, EOB sit<>stand, ambulation in room, toilet transfers, grooming in standing, and karmen-cares. Unable to assess dressing today as pt became distracted/fixated on finding her teeth.   Additional Documentation   Rehab Comments Significant safety concerns with pt returning home alone based on pt's current functional status today. Case management and Dr. Merida were informed.   OT Plan Progress pt's safety and independence in ADLs, as able.   Total Session Time   Total Session Time (minutes) 23 minutes

## 2021-01-22 NOTE — PROGRESS NOTES
[facility]    Hospitalist Progress Note      Assessment & Plan   Jimena Mensah is a 65 year old female who was admitted on 1/19/2021.      1.  Alcohol dependence with withdrawal: This seemingly has improved markedly.  She is very confused and agitated the last 24 to 48 hours however overnight now she is basically back to normal.  Having a normal conversation with her is joking around.  Has no real complaints.  We will discontinue the schedule diazepam.    2.  Hyponatremia: This has resolved.  Likely was due to alcohol intake.  Volume status appears to be euvolemic we will discontinue IV fluids.    3.  Rhabdomyolysis: This was mild from laying on the floor.  No signs of any renal insufficiency.    4.  Diarrhea: This also has resolved.  No signs or evidence that this was C. difficile colitis.    5.  Chronic hypoxic respiratory failure due to COPD: Chronic COPD she has no dyspnea currently.  She is on 2 L of O2 will take this off and see how she is on room air.  She does have oxygen at home that she uses on a as needed basis.  Denies any dyspnea.        6.  Status post fall: No evidence of any resulting trauma.  She will see PT and OT today for ambulation.  If does well possible/probable discharge home by tomorrow.        Diet: Combination Diet Low Saturated Fat Na <2400mg Diet  Fluids: We will IV lock    DVT Prophylaxis: Pneumatic Compression Devices  Code Status: Full Code    Disposition: Expected discharge in 1-2 days once able to safely ambulate.    Dusty Merida    Interval History   Overnight patient is cleared up significantly.  She is alert and oriented x3 currently has no real complaints at all.  Is eating breakfast.  Hemodynamically stable.  With PT and OT see her ambulate.  Anticipate possible discharge home tomorrow depending upon how well she does with physical therapy.  Certainly appears her withdrawal has resolved.    -Data reviewed today: I reviewed all new labs and imaging results  over the last 24 hours. I personally reviewed no images or EKG's today.    Physical Exam   Temp: 97.2  F (36.2  C) Temp src: Tympanic BP: 160/61 Pulse: 83   Resp: 18 SpO2: 97 % O2 Device: Nasal cannula Oxygen Delivery: 2 LPM  Vitals:    01/20/21 0405 01/21/21 0500 01/22/21 0556   Weight: 93.5 kg (206 lb 2.1 oz) 97.6 kg (215 lb 2.7 oz) 98.2 kg (216 lb 7.9 oz)     Vital Signs with Ranges  Temp:  [97.2  F (36.2  C)-98  F (36.7  C)] 97.2  F (36.2  C)  Pulse:  [70-83] 83  Resp:  [16-18] 18  BP: (130-160)/(61-95) 160/61  SpO2:  [92 %-97 %] 97 %  I/O last 3 completed shifts:  In: 2216 [P.O.:360; I.V.:1856]  Out: 1200 [Urine:1200]    Peripheral IV 01/19/21 Right Upper forearm (Active)   Site Assessment WDL 01/22/21 0122   Line Status Infusing 01/22/21 0122   Phlebitis Scale 0-->no symptoms 01/22/21 0122   Infiltration Scale 0 01/22/21 0122   Number of days: 3       Rash 01/19/21 0510 (Active)   Distribution localized 01/22/21 0122   Configuration/Shape asymmetric 01/22/21 0122   Borders irregular;diffuse 01/22/21 0122   Characteristics flat;pain/discomfort 01/22/21 0122   Color red 01/22/21 0122   Care, Rash open to air 01/22/21 0122   Number of days: 3     Line/device assessment(s) completed for medical necessity    Constitutional: Alert and oriented x3. No distress    HEENT: Normocephalic/atraumatic, PERRL, EOMI, mouth moist, neck supple, no LN.     Cardiovascular: RRR. no Murmur, no  rubs, or gallops.  JVD no.  Bruits no.  Pulses 2+    Respiratory: Clear to auscultation bilaterally    Abdomen: Soft, nontender, nondistended, no organomegaly. Bowel sounds present    Extremities: Warm/dry.  No edema    Neuro:   Non focal, cranial nerves intact, Moves all extremities.  Medications     sodium chloride 75 mL/hr at 01/21/21 2106       budesonide-formoterol  2 puff Inhalation BID     citalopram  40 mg Oral Daily     diazepam  5 mg Oral 4x Daily     diclofenac  4 g Topical 4x Daily     diclofenac  75 mg Oral BID    And      misoprostol  200 mcg Oral BID     folic acid  1 mg Oral Daily     gabapentin  200 mg Oral TID     levothyroxine  112 mcg Oral QAM AC     losartan  25 mg Oral Daily     multivitamin w/minerals  1 tablet Oral Daily     nicotine   Transdermal Q8H     olopatadine  1 drop Both Eyes BID     sodium chloride (PF)  3 mL Intracatheter Q8H     thiamine  100 mg Oral Daily     traZODone  50 mg Oral At Bedtime       Data   Recent Labs   Lab 01/22/21  0518 01/21/21  0647 01/20/21  0517 01/19/21  0800 01/19/21  0800 01/19/21  0303   WBC 8.7 7.6 6.5  --  11.8* 15.2*   HGB 12.2 12.7 12.8  --  14.4 15.9*   MCV 92 92 90  --  86 86    130* 153  --  197 243   INR  --   --   --   --   --  0.93    141 135   < > 128* 123*   POTASSIUM 3.8 3.8 3.9  --  4.6 4.7   CHLORIDE 106 111* 103  --  93* 87*   CO2 28 25 27  --  23 22   BUN 15 19 19  --  9 10   CR 0.53 0.58 0.59  --  0.50* 0.53   ANIONGAP 3 5 5  --  12 14   SHARON 8.2* 8.4* 8.3*  --  8.1* 8.9   * 106* 90  --  70 86   ALBUMIN 2.3* 2.6* 2.8*  --   --  4.0   PROTTOTAL 5.5* 5.6* 5.8*  --   --  7.8   BILITOTAL 0.6 0.6 1.1  --   --  0.8   ALKPHOS 133 144 111  --   --  107   * 143* 64*  --   --  62*   * 254* 141*  --   --  110*   TROPI  --   --   --   --  0.035 0.026    < > = values in this interval not displayed.       No results found for this or any previous visit (from the past 24 hour(s)).

## 2021-01-23 PROBLEM — F10.239 ALCOHOL DEPENDENCE WITH WITHDRAWAL WITH COMPLICATION (H): Status: ACTIVE | Noted: 2021-01-01

## 2021-01-23 NOTE — DISCHARGE INSTRUCTIONS
You will need to schedule a follow up appointment with Chantell Cook for with in 7days.  Please call [phone] to schedule your appointment.

## 2021-01-23 NOTE — PLAN OF CARE
Face to face report given with opportunity to observe patient.    Report given to aldo Hahn RN   1/22/2021  11:45 PM

## 2021-01-23 NOTE — PLAN OF CARE
/63 (BP Location: Left arm)   Pulse 74   Temp 97.7  F (36.5  C) (Tympanic)   Resp 18   Wt 98.2 kg (216 lb 7.9 oz)   SpO2 94%   BMI 32.92 kg/m      Vitals as charted. Oriented & pleasant/cooperative with cares. CIWAs of 2, 0. Pain to hip and leg 8/10 managed w/ PO tylenol x1 and Voltaren gel. LSs clear. RA while awake. Pt up in chair tonight after ambulating to the bathroom. Assist of 1 w/ GB & walker.  Buttocks/thighs covered with barrier cream. Remains open to air. Rash more notable to BLEs tonight. Bed in lowest position. Call light in reach. ID band in place. Makes needs known. Room near unit station. Bed/Chair alarms on.     Face to face report given with opportunity to observe patient.    Report given to Kirsten Contreras RN   1/23/2021  7:27 AM

## 2021-01-23 NOTE — PLAN OF CARE
01/23/21 0900   Signing Clinician's Name / Credentials   Signing clinician's name / credentials [facility]   Quick Adds   Rehab Discipline PT   Therapeutic Activity   Minutes of Treatment 20 minutes   Symptoms Noted During/After Treatment Fatigue   Treatment Detail Pt was up in chair upon arrival. She reports she is ready to go home. Pt ambul x 80 ft x 2 using FWW req min asst 1, w/c push of another for safety. Pt dinesh occas impulsivity and safety concerns when walking as she will let go of walker and  lean/reach unsafely towards railing instead of walker. P02 sats remain >96% and pulse 72. Sligh SOB occurring pt reports this is wnl.DIscussed dc plans back to aptment. Does not have steps and can walk hallwaysthere. Issued FWW fo rhome use with Dr Merida and pt  signing orders/paperwork. Pt taught how to use walker correctly, fold/unfold and height was adjusted correctly. Pt instructed in avoiding lifting or pivoting walker.    PT Discharge Planning    PT Discharge Recommendation (DC Rec) home with home care physical therapy   PT Rationale for DC Rec Pt demonstrates  weakness, decreased activity tolerance, impaired balance and decreased safety awareness putting her at a high risk for falls. Pt would benefit from SNF but is returning home with assist/homecare rehab/nsg.   Additional Documentation   Rehab Comments Pt is returning home with homecare services and new FWW issued. She is at risk for falls and further hospitalization as she is returning home vs SNF/asstd living    PT Plan No further inpatient PT planned as pt is dcing today/tomorrow. Homecare PT would be beneficial for safety, home environment assessment, balance and strength training.    Total Session Time   Total Session Time (minutes) 20 minutes

## 2021-01-23 NOTE — DISCHARGE SUMMARY
[facility]  Hospitalist Discharge Summary      Date of Admission:  1/19/2021  Date of Discharge:  1/23/2021  Discharging Provider: Dusty Merida MD      Discharge Diagnoses   Fall  Alcohol dependence with withdrawal  Alcohol abuse  Acute alcohol intoxication  COPD  Hyponatremia  Mild rhabdomyolysis  Elevated liver function test due to alcohol intake  alcoholic hepatitis      Follow-ups Needed After Discharge   Follow-up Appointments     Follow-up and recommended labs and tests       Follow up with primary care provider, Chantell Cook, within 7 days   for hospital follow- up.  No follow up labs or test are needed.         None    Unresulted Labs Ordered in the Past 30 Days of this Admission     No orders found from 12/20/2020 to 1/20/2021.      These results will be followed up by not needed    Discharge Disposition   Discharged to home  Condition at discharge: Good    Hospital Course   Patient is a 65-year-old female who does have a history of alcohol use presented to our ER on the day of her admission.  She fell at some point and cannulate on the floor up until the point where she could get to the phone and call 911.  She was rather disheveled soaked with urine.  She complained of 10 out of 10 hip pain.  States that she just fell her leg gave out she does get some bad knees.  Denies any head trauma shortness of breath at all.  Patient does drink on a daily basis least maybe a sixpack of beer a day.  In the ER she was hemodynamically stable.  Room air sats were normal however when she would sleep she would require 2 L.  He was afebrile.  Her initial evaluation also showed her labs are significant for sodium 123 renal function was normal creatinine is 0.53 troponin was 0.026 CK was 3051 liver function test showed mild elevation of transaminases white count is 15,000 hemoglobin is 15.9 blood sugar was 86.  Her blood alcohol level was 0.3.  CT scan of her pelvis showed no fracture.  Chest x-ray  was clear    Patient was subsequently then admitted to the hospital was given some IV fluids we monitored her sodium level and also her CK because of rhabdomyolysis.  Renal function was good.  Her serum sodium improved significantly back to normal 138 by time of her discharge.  Her CK initially went up to 4881 then back down to 3000 continue to decrease.  It was of no major concern.    The patient showed no signs of any infection.    She did become extremely confused after the first 24 hours.  Felt that she had a least some mild withdrawal issues.  She was given benzodiazepines and some Zyprexa and for couple days she basically just slept and then now woke up and over the last 48 hours she has been very alert appropriate today she is walking with physical therapy does have some chronic arthritis pains and issues at home she has electric scooters wheelchairs everything.  She refuses to consider going to a nursing home.  And today she did walk with a walker with physical therapy.  And she will be going home with a new walker.    She did have a mild elevation of her transaminases consistent with hepatitis likely due to alcohol use.    So overall feel that the patient is just been drinking too much became extremely intoxicated fell laid on the floor for unknown amount of time finally came into the ER for evaluation.  Blood alcohol of 0.3.  Mild electrolyte abnormalities hyponatremia the elevated CK with IV fluids nutrition is back to her usual baseline.      Consultations This Hospital Stay   PHYSICAL THERAPY ADULT IP CONSULT  OCCUPATIONAL THERAPY ADULT IP CONSULT    Code Status   Full Code    Time Spent on this Encounter   I, Dusty Merida MD, personally saw the patient today and spent greater than 30 minutes discharging this patient.       Dusty Merida MD  [facility]  [address] [place] [id number]  Phone: [phone]  Fax:  [phone]  ______________________________________________________________________    Physical Exam   Vital Signs: Temp: 97.7  F (36.5  C) Temp src: Tympanic BP: 139/74 Pulse: 65   Resp: 16 SpO2: 96 % O2 Device: None (Room air) Oxygen Delivery: 1 LPM  Weight: 215 lbs 4.8 oz  Constitutional: awake, alert, cooperative, no apparent distress, and appears stated age  Respiratory: No increased work of breathing, good air exchange, clear to auscultation bilaterally, no crackles or wheezing  Cardiovascular: Normal apical impulse, regular rate and rhythm, normal S1 and S2, no S3 or S4, and no murmur noted  GI: No scars, normal bowel sounds, soft, non-distended, non-tender, no masses palpated, no hepatosplenomegally  Musculoskeletal: There is no redness, warmth, or swelling of the joints.  Full range of motion noted.  Motor strength is 5 out of 5 all extremities bilaterally.  Tone is normal.       Primary Care Physician   Chantell Cook    Discharge Orders      Home Care PT Referral for Hospital Discharge      Home Care OT Referral for Hospital Discharge      Reason for your hospital stay    Patient was admitted after falling at home.     Follow-up and recommended labs and tests     Follow up with primary care provider, Chantell Cook, within 7 days for hospital follow- up.  No follow up labs or test are needed.     Activity    Your activity upon discharge: activity as tolerated up with walker     Discharge Instructions    Patient may resume her home oxygen she uses 1 to 2 L as needed.     MD face to face encounter    Documentation of Face to Face and Certification for Home Health Services    I certify that patient: Jimena Mensah is under my care and that I, or a nurse practitioner or physician's assistant working with me, had a face-to-face encounter that meets the physician face-to-face encounter requirements with this patient on: January 23, 2021.    This encounter with the patient was in whole, or in  part, for the following medical condition, which is the primary reason for home health care: Weakness diffuse osteoarthritis  I certify that, based on my findings, the following services are medically necessary home health services: Occupational Therapy and Physical Therapy.    My clinical findings support the need for the above services because: Occupational Therapy Services are needed to assess and treat cognitive ability and address ADL safety due to impairment in ambulation. and Physical Therapy Services are needed to assess and treat the following functional impairments: Ambulation.    Further, I certify that my clinical findings support that this patient is homebound (i.e. absences from home require considerable and taxing effort and are for medical reasons or [employer] services or infrequently or of short duration when for other reasons) because: Requires assistance of another person or specialized equipment to access medical services because patient: Is unable to exit home safely on own due to: Increased risk of falls...    Based on the above findings. I certify that this patient is confined to the home and needs intermittent skilled nursing care, physical therapy and/or speech therapy.  The patient is under my care, and I have initiated the establishment of the plan of care.  This patient will be followed by a physician who will periodically review the plan of care.  Physician/Provider to provide follow up care: Chantell Cook    Attending hospital physician (the Medicare certified [place] provider): Dusty Merida MD  Physician Signature: See electronic signature associated with these discharge orders.  Date: 1/23/2021     Full Code     Diet    Follow this diet upon discharge: Orders Placed This Encounter      Combination Diet Low Saturated Fat Na <2400mg Diet       Significant Results and Procedures   Most Recent 3 CBC's:  Recent Labs   Lab Test 01/23/21  0517 01/22/21  0518 01/21/21  0647   WBC  8.0 8.7 7.6   HGB 13.2 12.2 12.7   MCV 93 92 92    152 130*     Most Recent 3 BMP's:  Recent Labs   Lab Test 01/23/21  0517 01/22/21  0518 01/21/21  0647    137 141   POTASSIUM 3.6 3.8 3.8   CHLORIDE 103 106 111*   CO2 31 28 25   BUN 16 15 19   CR 0.71 0.53 0.58   ANIONGAP 4 3 5   SHARON 9.4 8.2* 8.4*   * 176* 106*     Most Recent 2 LFT's:  Recent Labs   Lab Test 01/23/21  0517 01/22/21  0518   * 137*   * 126*   ALKPHOS 203* 133   BILITOTAL 0.5 0.6     Most Recent 3 INR's:  Recent Labs   Lab Test 01/19/21  0303 05/01/13  1457   INR 0.93 1.06     Most Recent 3 Troponin's:  Recent Labs   Lab Test 01/19/21  0800 01/19/21  0303 05/16/20  0212   TROPI 0.035 0.026 <0.015     Most Recent D-dimer:No lab results found.  Most Recent 6 Bacteria Isolates From Any Culture (See EPIC Reports for Culture Details):  Recent Labs   Lab Test 02/14/15  1215   CULT No beta hemolytic Streptococcus Group A isolated     Most Recent TSH and T4:  Recent Labs   Lab Test 01/19/21  0303   TSH 2.72     Most Recent Hemoglobin A1c:No lab results found.  Most Recent Urinalysis:  Recent Labs   Lab Test 01/19/21  0312   COLOR Light Yellow   APPEARANCE Clear   URINEGLC Negative   URINEBILI Negative   URINEKETONE 10*   SG 1.010   UBLD Moderate*   URINEPH 5.5   PROTEIN 10*   NITRITE Negative   LEUKEST Negative   RBCU 0   WBCU <1     Most Recent ESR & CRP:  Recent Labs   Lab Test 06/09/16  1808   CRP 6.8     Most Recent CPK:  Recent Labs   Lab Test 01/20/21  0517 01/19/21  0800 01/19/21  0303   CKT 3,005* 4,881* 3,051*   ,   Results for orders placed or performed during the hospital encounter of 01/19/21   CT Pelvis Bone wo Contrast    Narrative    CT PELVIS BONE WO CONTRAST    HISTORY: 65 years Female Pelvic trauma    COMPARISON: None    TECHNIQUE: Axial CT imaging the pelvis was performed without contrast.  Coronal and sagittal reconstructions were obtained.    FINDINGS: The sacroiliac joints and hip joints and pubic  symphysis are  congruent. There is no evidence of hip fracture or dislocation. There  is no evidence of pelvic fracture.    Patient status post laminectomy at the L4-L5 level.      Impression    IMPRESSION: No evidence of pelvic or hip fracture.    JONATHAN LOZANO MD   XR Chest Port 1 View    Narrative    XR CHEST PORT 1 VW    HISTORY: 65 yearsFemale fall    TECHNIQUE: A single view of the chest was performed    COMPARISON: 2/14/2015    FINDINGS: Heart size and pulmonary vascularity are within normal  limits. Lungs are clear. No consolidating airspace opacities are  present.        Impression    IMPRESSION: Clear chest    JONATHAN LOZANO MD   XR Pelvis w Hip Port Right G/E 2 Views    Narrative    XR PELVIS AND HIP PORTABLE RIGHT 2 VIEWS    HISTORY: 65 years Female pain    COMPARISON: None    TECHNIQUE: Pelvis and 2 views right hip    FINDINGS: The sacroiliac joints pubic symphysis and hip joints are  congruent. There is no evidence of fracture or dislocation.      Impression    IMPRESSION: Negative study.    JONATHAN LOZANO MD       Discharge Medications   Current Discharge Medication List      CONTINUE these medications which have NOT CHANGED    Details   atorvastatin (LIPITOR) 10 MG tablet Take 10 mg by mouth daily      cetirizine (ZYRTEC) 10 MG tablet Take 10 mg by mouth daily      citalopram (CELEXA) 40 MG tablet Take 40 mg by mouth daily       cyclobenzaprine (FLEXERIL) 10 MG tablet Take 10 mg by mouth nightly as needed      diazepam (VALIUM) 2 MG tablet Take 1 tablet by mouth daily as needed for muscle spasms       diclofenac-misoprostol (ARTHROTEC 75) 75-0.2 MG per EC tablet Take 1 tablet by mouth 2 times daily      FIBER- MG tablet Take 2 tablets by mouth daily      ipratropium - albuterol 0.5 mg/2.5 mg/3 mL (DUONEB) 0.5-2.5 (3) MG/3ML neb solution Inhale 3 mLs into the lungs every 6 hours as needed      lactase (LACTAID) 3000 UNIT tablet Take 3,000 Units by mouth as needed before meals;  administer with first bite of dairy food.      levothyroxine (SYNTHROID/LEVOTHROID) 112 MCG tablet Take 112 mcg by mouth daily      losartan (COZAAR) 25 MG tablet Take 25 mg by mouth daily      olopatadine (PATANOL) 0.1 % ophthalmic solution Place 1 drop into both eyes 2 times daily      simethicone (MYLICON) 125 MG chewable tablet Take 1 tablet by mouth 4 times daily as needed      SYMBICORT 160-4.5 MCG/ACT Inhaler Inhale 2 puffs into the lungs 2 times daily       traZODone (DESYREL) 50 MG tablet Take 50 mg by mouth At Bedtime       triamcinolone (KENALOG) 0.1 % cream Apply topically 4 times daily as needed       albuterol (PROAIR HFA/PROVENTIL HFA/VENTOLIN HFA) 108 (90 BASE) MCG/ACT Inhaler Inhale 2 puffs into the lungs every 4 hours as needed       EPINEPHrine (EPIPEN) 0.3 MG/0.3ML injection Inject 0.3 mg into the muscle once as needed. For anaphylaxis      nystatin (MYCOSTATIN) 820251 UNIT/GM external cream Apply topically 2 times daily      OnabotulinumtoxinA (BOTOX IJ) Botox injection given in office in bladder during cystoscopy 100 mg.           Allergies   Allergies   Allergen Reactions     Cats      Other reaction(s): *Unknown  Cat/feline product derivatives  Fur-animal dander     Dust Mite Extract      Other reaction(s): *Unknown  Trees, pollen, ragweed     Dust Mites      House dust     Fluticasone      Other reaction(s): Headache  Other reaction(s): Headache     Fluticasone Propionate Other (See Comments)     Flonase  headaches     Mometasone Other (See Comments)     Other reaction(s): Headache     Mometasone Furoate      Nasonex  Headaches     Ragweeds      Trees

## 2021-01-23 NOTE — PLAN OF CARE
Pt alert and orientated.  Up in chair for supper. Fed self. Ate well. Nibbling on snacks thru out the shift.  Watching TV.  Incontinent of stool. Used commode.  Needs 2 people for transferring- standby.

## 2021-01-24 NOTE — PLAN OF CARE
Patient discharged at 3:12 AM via wheel chair accompanied by staff. Prescriptions - None ordered for discharge. All belongings sent with patient.     Discharge instructions reviewed with Chiquita. Listed belongings gathered and returned to patient. Room cleared with pt at time of discharge.     Patient discharged to home.     Core Measures and Vaccines  Core Measures applicable during stay: No  Pneumonia and Influenza given prior to discharge, if indicated: No    Surgical Patient   Surgical Procedures during stay: none  Did patient receive discharge instruction on wound care and recognition of infection symptoms? N/A    MISC  Follow up appointment made:  No, scheduling unavailable  Home and hospital aquired medications returned to patient: Yes  Patient reports pain was well managed at discharge: Yes

## 2021-01-25 NOTE — DISCHARGE INSTRUCTIONS
Please follow-up to arrange physical therapy in improve your strength and balance.    Also focus on balanced nutrition with protein to support muscle growth.    Avoid alcohol and seek support systems for Chemical Dependency Treatment such as Alcoholics Anonymous to assist you in maintaining sobriety.

## 2021-01-25 NOTE — ED NOTES
[facility] notified to see if able to give patient a ride home.  states that he is just getting to sleep and that he also has to work in the morning.  He asks if patient can wait until 0600 and he would have oncoming  come and pick patient up. Informed  that if he was unable to come now, then we would have to wait until 0600.  He states he will let the oncoming  know.

## 2021-01-25 NOTE — ED NOTES
Patient given written and verbal discharge instructions and patient verbalizes understanding. Patient left via wheelchair and going home with [facility] .

## 2021-01-25 NOTE — PLAN OF CARE
Occupational Therapy Discharge Summary    Reason for therapy discharge:    Discharged to home with home therapy.    Progress towards therapy goal(s). See goals on Care Plan in [facility] record for goal details.  Goals partially met.  Barriers to achieving goals:   discharge from facility.    Therapy recommendation(s):    SNF for STR was strongly recommended due to weakness, impaired activity tolerance, decreased cognition (memory, attention), poor safety awareness, impulsivity, and impaired balance/high fall risk, thus requiring [place] for ADLs/functional mobility. Pt refused and is returning home with home therapy. Pt would benefit from 24 hour assist from family.

## 2021-01-25 NOTE — ED PROVIDER NOTES
History     Chief Complaint   Patient presents with     Fever     Leg Swelling     HPI  Jimena Mensah is a 65 year old woman with history of recent admission to [facility] [place] (1/19 - 1/23/21) following a fall with prolonged downtime in the setting of alcohol intoxication. She was admitted for rhadomyolysis, hyponatremia and right hip pain. CT was negative for hip/pelvic fracture and the patient's CK initially increased, then decreased with IVF. Her hyponatremia, possibly related to alcohol abuse, corrected with this therapy as well. She underwent alcohol withdrawal, becoming delirious for a time, but responded to treatment with benzodiazepines. After refusing NH care, the patient was discharged home with plan for outpatient PT.    The patient presents by EMS reporting that she continues to feel weak even though she is ambulating well with her new walker (provided on discharge). She has not had a fall since returning home.She reports that she was sober from EtOH for 10 years before relapsing several months ago, but does not desire CDT and feels she can quit drinking on her own. She states she has not had EtOH since her admission. She does note suboptimal nutrition.    She also reports some mild leg swelling without chest pain or shortness of breath. This has developed over the last 1 - 2 days. She is most concerned with a rash that she thinks began when she was still in the hospital. She had been laying for a prolonged period of time in her urine-soaked brief prior to admission and admits to suboptimal hygiene, although she tries to change her brief when she notices it's wet. She has not been using the triamcinolone cream prescribed for this purpose. No fevers/chills or drainage from the rash which mostly involves her buttocks and the back of her legs.    No cough, loss of taste/smell or nausea/vomiting/diarrhea. No recent known exposure to COVID.      Allergies:  Allergies   Allergen Reactions     Cats      Other  reaction(s): *Unknown  Cat/feline product derivatives  Fur-animal dander     Dust Mite Extract      Other reaction(s): *Unknown  Trees, pollen, ragweed     Dust Mites      House dust     Fluticasone      Other reaction(s): Headache  Other reaction(s): Headache     Fluticasone Propionate Other (See Comments)     Flonase  headaches     Mometasone Other (See Comments)     Other reaction(s): Headache     Mometasone Furoate      Nasonex  Headaches     Ragweeds      Trees        Problem List:    Patient Active Problem List    Diagnosis Date Noted     Alcohol dependence with withdrawal with complication (H) 01/23/2021     Priority: Medium     Hyponatremia 01/19/2021     Priority: Medium     Non-traumatic rhabdomyolysis 01/19/2021     Priority: Medium     Colon cancer stage II 05/01/2013     Priority: Medium     Alcohol abuse 05/01/2013     Priority: Medium     Smoker 05/01/2013     Priority: Medium     Liver failure (H) 05/01/2013     Priority: Medium     Chronic neck pain 05/01/2013     Priority: Medium     Hypothyroidism 05/01/2013     Priority: Medium     Secondary to thyroid ablation       COPD (chronic obstructive pulmonary disease) (H) 05/01/2013     Priority: Medium     Balance disorder 05/01/2013     Priority: Medium     Secondary to nerve 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 Provider to review          Past Medical History:    Past Medical History:   Diagnosis Date     Alcoholism (H)      Allergic rhinitis, cause unspecified 8/16/2012     Arthritis      Follow-up examination, following other surgery 8/16/2012     Hallucinations 10/23/2008     Hypertension      Mixed hearing loss, unspecified 8/16/2012     Sensorineural hearing loss,  unspecified 2012     Thyroid disease        Past Surgical History:    Past Surgical History:   Procedure Laterality Date     bleeding,fibroids      NOBLE/BSO     cancer      bowel resection;      CARDIAC SURGERY      angiogram      delivery x 2       COLONOSCOPY       COLONOSCOPY  6/3/2013    Procedure: COLONOSCOPY;  COLONSCOPY WITH POSSIBLE BIOPSY;  Surgeon: Yudy Kamara DO;  Location: [facility]     COLONOSCOPY N/A 10/23/2014    Procedure: COLONOSCOPY;  Surgeon: Jordan Stephenson MD;  Location: [facility] OR     COLONOSCOPY - HIM SCAN  2006    Colonoscopy/flex diagnostic 2006     COLONOSCOPY - HIM SCAN  2008    Colonoscopy/flex diagnostic     COLONOSCOPY - HIM SCAN  2004    Colonoscopy with bx-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 3/24/2004     COLONOSCOPY - HIM SCAN  2005    Colonoscopy with bx-mild inflammatory change consistent with anastomotic site 2005     GYN SURGERY      hysterectomy     PHACOEMULSIFICATION WITH STANDARD INTRAOCULAR LENS IMPLANT Left 10/16/2018    Procedure: CATARACT EXTRACTION LEFT EYE WITH IMPLANT;  Surgeon: Jamarcus Pham MD;  Location: [facility] OR     PHACOEMULSIFICATION WITH STANDARD INTRAOCULAR LENS IMPLANT Right 2018    Procedure: RIGHT CATARACT EXTRACTION WITH IMPLANT;  Surgeon: Jamarcus Pham MD;  Location: [facility] OR     Rotator cuff repair LT       sigmoid Diverticulitis      colonoscopy with polypectomy; 3/22/2010; Provider GERALD RANGEL      sinus surgery x 2       SOFT TISSUE SURGERY      lesion removal     TYMPANOMASTOIDECTOMY  2011    RT       Family History:    Family History   Problem Relation Age of Onset     Cancer Mother 39        colon, cause of death     Cancer Maternal Uncle         bone     Cancer Maternal Uncle         bone     Cancer Other         colon, grandmother     Heart Disease Maternal Grandmother         heart disease     Hypertension Maternal Grandmother       "Heart Disease Paternal Uncle         heart disease     Heart Disease Paternal Uncle         heart disease       Social History:  Marital Status:  Single [1]  Social History     Tobacco Use     Smoking status: Current Every Day Smoker     Packs/day: 0.50     Years: 30.00     Pack years: 15.00     Types: Cigarettes     Last attempt to quit: 3/23/2016     Years since quittin.8     Smokeless tobacco: Never Used     Tobacco comment: passive exposure: yes   Substance Use Topics     Alcohol use: Yes     Comment: beer, \"way, way a lot\"     Drug use: No        Medications:         albuterol (PROAIR HFA/PROVENTIL HFA/VENTOLIN HFA) 108 (90 BASE) MCG/ACT Inhaler       atorvastatin (LIPITOR) 10 MG tablet       cetirizine (ZYRTEC) 10 MG tablet       citalopram (CELEXA) 40 MG tablet       diazepam (VALIUM) 2 MG tablet       diclofenac-misoprostol (ARTHROTEC 75) 75-0.2 MG per EC tablet       FIBER- MG tablet       ipratropium - albuterol 0.5 mg/2.5 mg/3 mL (DUONEB) 0.5-2.5 (3) MG/3ML neb solution       lactase (LACTAID) 3000 UNIT tablet       levothyroxine (SYNTHROID/LEVOTHROID) 112 MCG tablet       losartan (COZAAR) 25 MG tablet       simethicone (MYLICON) 125 MG chewable tablet       SYMBICORT 160-4.5 MCG/ACT Inhaler       traZODone (DESYREL) 50 MG tablet       cyclobenzaprine (FLEXERIL) 10 MG tablet       EPINEPHrine (EPIPEN) 0.3 MG/0.3ML injection       nystatin (MYCOSTATIN) 008575 UNIT/GM external cream       olopatadine (PATANOL) 0.1 % ophthalmic solution       OnabotulinumtoxinA (BOTOX IJ)       triamcinolone (KENALOG) 0.1 % cream        Review of Systems   Constitutional: Negative for chills and unexpected weight change.   HENT: Negative for congestion.    Eyes: Negative for redness.   Respiratory: Negative for wheezing.    Cardiovascular: Negative for palpitations.   Gastrointestinal: Negative for abdominal pain.   Genitourinary: Negative for difficulty urinating.   Musculoskeletal: Negative for back pain and " "neck stiffness.   Skin: Negative for color change.   Neurological: Negative for headaches.   Psychiatric/Behavioral: Negative for confusion.       Physical Exam   BP: 174/94  Pulse: 76  Temp: 99  F (37.2  C)  Resp: 16  Height: 160 cm (5' 3\")  Weight: 103 kg (227 lb 1.2 oz)  SpO2: 96 %      Physical Exam    General Appearance: well-developed, well-nourished, alert & oriented, no apparent distress.    HEENT: atraumatic. Pupils equal, round & reactive to light, extraocular movements intact. Bilateral ear canals clear. Nose without rhinorrhea or epistaxis. Clear oropharynx. Moist mucous membranes.    Neck: normal inspection, non-tender, full & painless ROM, supple, no lymphadenopathy or nuchal rigidity. No jugular venous distension.    Cardiovascular: regular rate, rhythm, normal S1 & S2, no murmurs, rubs or gallops.    Respiratory: clear lung sounds with good air entry, no wheezes rales or rhonchi, no acute respiratory distress.    Gastrointestinal: normal inspection, normal bowel sounds, non-tender, no masses or organomegaly.    Extremities: normal inspection, 2+/4+ pulses bilaterally, normal & painless ROM, non-tender, joints normal. Mild, non-pitting edema of the ankles.    Neurologic: alert & oriented x 3, CN II - XII intact, 4/5 strength in proximal & distal musculature in all extremities, sensation intact and equal in all extremities. Normal speech, no aphasia or dysarthria. Coordination intact. No extremity drift. Patient is able to ambulate with her walker without difficulty.    Skin: normal color, maculopapular rash involving the bilateral buttocks (in the shape of the patient's brief) as well as the posterior aspect of her bilateral legs (similar to the shape of a recliner).              ED Course        Procedures           Results for orders placed or performed during the hospital encounter of 01/25/21 (from the past 24 hour(s))   CBC with platelets differential   Result Value Ref Range    WBC 8.0 4.0 - " 11.0 10e9/L    RBC Count 4.01 3.8 - 5.2 10e12/L    Hemoglobin 12.4 11.7 - 15.7 g/dL    Hematocrit 37.4 35.0 - 47.0 %    MCV 93 78 - 100 fl    MCH 30.9 26.5 - 33.0 pg    MCHC 33.2 31.5 - 36.5 g/dL    RDW 14.9 10.0 - 15.0 %    Platelet Count 224 150 - 450 10e9/L    Diff Method Automated Method     % Neutrophils 48.8 %    % Lymphocytes 32.0 %    % Monocytes 14.8 %    % Eosinophils 2.9 %    % Basophils 0.6 %    % Immature Granulocytes 0.9 %    Nucleated RBCs 0 0 /100    Absolute Neutrophil 3.9 1.6 - 8.3 10e9/L    Absolute Lymphocytes 2.6 0.8 - 5.3 10e9/L    Absolute Monocytes 1.2 0.0 - 1.3 10e9/L    Absolute Eosinophils 0.2 0.0 - 0.7 10e9/L    Absolute Basophils 0.1 0.0 - 0.2 10e9/L    Abs Immature Granulocytes 0.1 0 - 0.4 10e9/L    Absolute Nucleated RBC 0.0    Comprehensive metabolic panel   Result Value Ref Range    Sodium 139 133 - 144 mmol/L    Potassium 3.6 3.4 - 5.3 mmol/L    Chloride 104 94 - 109 mmol/L    Carbon Dioxide 28 20 - 32 mmol/L    Anion Gap 7 3 - 14 mmol/L    Glucose 87 70 - 99 mg/dL    Urea Nitrogen 14 7 - 30 mg/dL    Creatinine 0.62 0.52 - 1.04 mg/dL    GFR Estimate >90 >60 mL/min/[1.73_m2]    GFR Estimate If Black >90 >60 mL/min/[1.73_m2]    Calcium 9.2 8.5 - 10.1 mg/dL    Bilirubin Total 0.4 0.2 - 1.3 mg/dL    Albumin 2.7 (L) 3.4 - 5.0 g/dL    Protein Total 6.3 (L) 6.8 - 8.8 g/dL    Alkaline Phosphatase 241 (H) 40 - 150 U/L     (H) 0 - 50 U/L     (H) 0 - 45 U/L   CRP inflammation   Result Value Ref Range    CRP Inflammation 28.2 (H) 0.0 - 8.0 mg/L   Nt probnp inpatient (BNP)   Result Value Ref Range    N-Terminal Pro BNP Inpatient 3,308 (H) 0 - 900 pg/mL   Troponin I   Result Value Ref Range    Troponin I ES 0.027 0.000 - 0.045 ug/L   Alcohol ethyl   Result Value Ref Range    Ethanol g/dL <0.01 0.01 g/dL   CK total   Result Value Ref Range    CK Total 213 30 - 225 U/L   Procalcitonin   Result Value Ref Range    Procalcitonin <0.05 ng/ml   UA reflex to Microscopic and Culture     Specimen: Catheterized Urine   Result Value Ref Range    Color Urine Straw     Appearance Urine Clear     Glucose Urine Negative NEG^Negative mg/dL    Bilirubin Urine Negative NEG^Negative    Ketones Urine Negative NEG^Negative mg/dL    Specific Gravity Urine 1.004 1.003 - 1.035    Blood Urine Negative NEG^Negative    pH Urine 7.0 4.7 - 8.0 pH    Protein Albumin Urine Negative NEG^Negative mg/dL    Urobilinogen mg/dL Normal 0.0 - 2.0 mg/dL    Nitrite Urine Negative NEG^Negative    Leukocyte Esterase Urine Negative NEG^Negative    Source Catheterized Urine        Medications - No data to display    Assessments & Plan (with Medical Decision Making)   The patient is a 65 year old woman with muscular deconditioning due to alcohol dependence an suboptimal nutrition as well as a rash.    1) Muscular deconditioning/weakness - mutlifactorial, related to alcohol dependence and sedentary lifestyle as well as suboptimal nutrition. No anemia, leukocytosis. Continued transaminitis without acute renal/hepatic abnormality. Normal procalcitonin. Negative troponin and no acute ST changes on EKG. No evidence of acute cystitis on UA.    Patient will be contacted by physical therapy (arranged on her recent discharge) to begin in-home therapy. Patient will also work to improve her nutrition with increased protein intake. She will avoid EtOH consumption, but declines CDT at this time.    2) Rash - likely related to suboptimal hygiene with prolonged urine exposure in soaked briefs given the appearance of the rash and no indication of infection. Given the area of the rash, systemic evidence of infection would be expected if their was a super-imposed infection. Patient has not been using the triamcinolone cream prescribed for this purpose, but will start removing soaked briefs immediately, wiping off any urine from her skin and applying the cream as prescribed.    Plan for PCP follow-up in 5 - 7 days regarding this ED visit. The patient  verbalizes agreement with this plan as well as to immediately return to the ED with any new/worsening S/Sx.      I have reviewed the nursing notes.    I have reviewed the findings, diagnosis, plan and need for follow up with the patient.    Discharge Medication List as of 1/25/2021  4:25 AM          Final diagnoses:   Rash   Muscular deconditioning   Alcohol dependence with unspecified alcohol-induced disorder (H)       1/25/2021   [facility]     Eduar Gatica MD  01/25/21 0626       Eduar Gatica MD  01/25/21 0656

## 2021-01-25 NOTE — PROGRESS NOTES
This writer faxed over discharge orders to [facility] to start services. Pt was updated on this.

## 2021-01-25 NOTE — ED TRIAGE NOTES
Pt notes rash acquired during previous hospital stay is spreading and getting worse.  Topical med Rxd for rash not helping.  Also noted edema to ankles bilat +1.  Thinks she may have had a fever but has no thermometer at home.

## 2021-10-19 ENCOUNTER — TRANSFERRED RECORDS (OUTPATIENT)
Dept: HEALTH INFORMATION MANAGEMENT | Facility: HOSPITAL | Age: 66
End: 2021-10-19

## (undated) DEVICE — PHACO ACCESSORY PACK 2.2MM

## (undated) DEVICE — BIN-LENS IMPLANT CART

## (undated) DEVICE — INSTRUMENT WIPE-VISIWIPE

## (undated) DEVICE — PHACO NEEDLE-MICS 2.0 STRAIGHT

## (undated) DEVICE — BETADINE 5% STERILE OPHTHALMIC SOLUTION 1 OZ.

## (undated) DEVICE — LABEL-STERILE PREPRINTED FOR OR

## (undated) DEVICE — CYSTOTOME-IRRIGATING  25G

## (undated) DEVICE — BIN-CATARACT BIN

## (undated) DEVICE — SENSOR-OXISENSOR II ADULT

## (undated) DEVICE — GLV-7.5 PROTEXIS PI CLASSIC LF/PF

## (undated) DEVICE — KNIFE-X STAR SLIT 2.4MM

## (undated) DEVICE — HANDPIECE-CAPSULEGUARD I/A STELLARIS

## (undated) DEVICE — DRSG-TEGADERM MEDIUM #1626

## (undated) DEVICE — KNIFE-MICRO UNITOME 3.0MM

## (undated) DEVICE — BIN-TECNIS DCB00 LENSES

## (undated) DEVICE — GLV-7.0 PROTEXIS PI CLASSIC LF/PF

## (undated) DEVICE — PACK-EYE-CUSTOM

## (undated) DEVICE — PACK-PHACO STELLARIS

## (undated) RX ORDER — ONDANSETRON 2 MG/ML
INJECTION INTRAMUSCULAR; INTRAVENOUS
Status: DISPENSED
Start: 2018-11-06